# Patient Record
Sex: MALE | Race: WHITE | Employment: OTHER | URBAN - METROPOLITAN AREA
[De-identification: names, ages, dates, MRNs, and addresses within clinical notes are randomized per-mention and may not be internally consistent; named-entity substitution may affect disease eponyms.]

---

## 2021-02-05 ENCOUNTER — TELEPHONE (OUTPATIENT)
Dept: INTERNAL MEDICINE CLINIC | Age: 71
End: 2021-02-05

## 2021-02-05 NOTE — TELEPHONE ENCOUNTER
Pt states he use to be a patient of Dr. Salima Starr back when he was on [address] 20 years ago and moved out of town, he is now back in the city and would like Dr. Darrell Alfaro to be his Doctor again.  Please advise His phone number is [phone]

## 2021-02-22 ENCOUNTER — OFFICE VISIT (OUTPATIENT)
Dept: INTERNAL MEDICINE CLINIC | Age: 71
End: 2021-02-22
Payer: MEDICARE

## 2021-02-22 VITALS
SYSTOLIC BLOOD PRESSURE: 122 MMHG | HEIGHT: 73 IN | RESPIRATION RATE: 14 BRPM | HEART RATE: 68 BPM | DIASTOLIC BLOOD PRESSURE: 72 MMHG | WEIGHT: 174 LBS | BODY MASS INDEX: 23.06 KG/M2 | TEMPERATURE: 98.6 F

## 2021-02-22 DIAGNOSIS — R35.1 NOCTURIA: ICD-10-CM

## 2021-02-22 DIAGNOSIS — K21.9 GASTROESOPHAGEAL REFLUX DISEASE WITHOUT ESOPHAGITIS: ICD-10-CM

## 2021-02-22 DIAGNOSIS — N40.1 BENIGN PROSTATIC HYPERPLASIA WITH LOWER URINARY TRACT SYMPTOMS, SYMPTOM DETAILS UNSPECIFIED: ICD-10-CM

## 2021-02-22 DIAGNOSIS — H81.313 AUDITORY VERTIGO INVOLVING BOTH EARS: ICD-10-CM

## 2021-02-22 DIAGNOSIS — Z00.00 ROUTINE GENERAL MEDICAL EXAMINATION AT A HEALTH CARE FACILITY: ICD-10-CM

## 2021-02-22 DIAGNOSIS — I25.10 CORONARY ARTERY DISEASE INVOLVING NATIVE CORONARY ARTERY OF NATIVE HEART WITHOUT ANGINA PECTORIS: ICD-10-CM

## 2021-02-22 DIAGNOSIS — E78.5 HYPERLIPIDEMIA LDL GOAL <70: ICD-10-CM

## 2021-02-22 DIAGNOSIS — Z00.00 WELL ADULT EXAM: Primary | ICD-10-CM

## 2021-02-22 DIAGNOSIS — F51.01 PRIMARY INSOMNIA: ICD-10-CM

## 2021-02-22 PROCEDURE — 93000 ELECTROCARDIOGRAM COMPLETE: CPT | Performed by: INTERNAL MEDICINE

## 2021-02-22 PROCEDURE — 99204 OFFICE O/P NEW MOD 45 MIN: CPT | Performed by: INTERNAL MEDICINE

## 2021-02-22 PROCEDURE — 1123F ACP DISCUSS/DSCN MKR DOCD: CPT | Performed by: INTERNAL MEDICINE

## 2021-02-22 PROCEDURE — 3017F COLORECTAL CA SCREEN DOC REV: CPT | Performed by: INTERNAL MEDICINE

## 2021-02-22 PROCEDURE — G8427 DOCREV CUR MEDS BY ELIG CLIN: HCPCS | Performed by: INTERNAL MEDICINE

## 2021-02-22 PROCEDURE — G8420 CALC BMI NORM PARAMETERS: HCPCS | Performed by: INTERNAL MEDICINE

## 2021-02-22 PROCEDURE — 1036F TOBACCO NON-USER: CPT | Performed by: INTERNAL MEDICINE

## 2021-02-22 PROCEDURE — G8482 FLU IMMUNIZE ORDER/ADMIN: HCPCS | Performed by: INTERNAL MEDICINE

## 2021-02-22 PROCEDURE — 4040F PNEUMOC VAC/ADMIN/RCVD: CPT | Performed by: INTERNAL MEDICINE

## 2021-02-22 PROCEDURE — G0439 PPPS, SUBSEQ VISIT: HCPCS | Performed by: INTERNAL MEDICINE

## 2021-02-22 RX ORDER — MECLIZINE HYDROCHLORIDE 25 MG/1
25 TABLET ORAL 3 TIMES DAILY PRN
COMMUNITY
End: 2021-05-24 | Stop reason: SDUPTHER

## 2021-02-22 RX ORDER — LANOLIN ALCOHOL/MO/W.PET/CERES
1000 CREAM (GRAM) TOPICAL DAILY
COMMUNITY
End: 2022-03-02 | Stop reason: ALTCHOICE

## 2021-02-22 RX ORDER — PHENOL 1.4 %
AEROSOL, SPRAY (ML) MUCOUS MEMBRANE DAILY
COMMUNITY

## 2021-02-22 RX ORDER — ALBUTEROL SULFATE 90 UG/1
2 AEROSOL, METERED RESPIRATORY (INHALATION) EVERY 6 HOURS PRN
COMMUNITY
End: 2021-11-03 | Stop reason: SDUPTHER

## 2021-02-22 RX ORDER — SUCRALFATE 1 G/1
1 TABLET ORAL PRN
COMMUNITY
End: 2022-06-20

## 2021-02-22 RX ORDER — PANTOPRAZOLE SODIUM 40 MG/1
40 TABLET, DELAYED RELEASE ORAL DAILY
COMMUNITY
End: 2021-07-27 | Stop reason: SDUPTHER

## 2021-02-22 RX ORDER — ASPIRIN 81 MG/1
81 TABLET ORAL DAILY
COMMUNITY

## 2021-02-22 RX ORDER — ROSUVASTATIN CALCIUM 20 MG/1
20 TABLET, COATED ORAL DAILY
COMMUNITY
End: 2022-03-02 | Stop reason: SDUPTHER

## 2021-02-22 RX ORDER — ACETAMINOPHEN 325 MG/1
650 TABLET ORAL EVERY 6 HOURS PRN
COMMUNITY

## 2021-02-22 RX ORDER — CLONAZEPAM 0.5 MG/1
0.5 TABLET ORAL DAILY PRN
COMMUNITY
End: 2021-08-26 | Stop reason: SDUPTHER

## 2021-02-22 RX ORDER — MULTIVIT-MIN/IRON/FOLIC ACID/K 18-600-40
1 CAPSULE ORAL DAILY
COMMUNITY

## 2021-02-22 SDOH — HEALTH STABILITY: MENTAL HEALTH: HOW OFTEN DO YOU HAVE A DRINK CONTAINING ALCOHOL?: MONTHLY OR LESS

## 2021-02-22 ASSESSMENT — ENCOUNTER SYMPTOMS
ALLERGIC/IMMUNOLOGIC NEGATIVE: 1
CHEST TIGHTNESS: 1

## 2021-02-22 ASSESSMENT — PATIENT HEALTH QUESTIONNAIRE - PHQ9
SUM OF ALL RESPONSES TO PHQ QUESTIONS 1-9: 1
SUM OF ALL RESPONSES TO PHQ QUESTIONS 1-9: 1
SUM OF ALL RESPONSES TO PHQ9 QUESTIONS 1 & 2: 1

## 2021-02-22 ASSESSMENT — LIFESTYLE VARIABLES: AUDIT-C TOTAL SCORE: 1

## 2021-02-22 NOTE — ASSESSMENT & PLAN NOTE
? Get some pain when aggravated  Last cardiologst took him off Toprol  -EKG   Refer to Dr Bess Zaman

## 2021-02-22 NOTE — ASSESSMENT & PLAN NOTE
Within normal limits for age- retired  no ADL issues,immunizations up to date, no depression ,no cognitive impairment  Colonoscopy up to date get old records   Eye exam up to date  Exercises as tolerated    No living will but does not want resuscitation   Findings and recommendations discussed with Pt   Labs pending

## 2021-02-22 NOTE — PROGRESS NOTES
every 6 hours as needed for Wheezing      Simethicone (GAS-X PO) Take by mouth as needed      acetaminophen (TYLENOL) 325 MG tablet Take 650 mg by mouth every 6 hours as needed for Pain      Lido-Menthol-Methyl Sal-Camph (CBD KINGS EX) Apply topically daily      vitamin B-12 (CYANOCOBALAMIN) 1000 MCG tablet Take 1,000 mcg by mouth daily      sucralfate (CARAFATE) 1 GM tablet Take 1 g by mouth as needed       No current facility-administered medications for this visit. History reviewed. No pertinent past medical history. Family History   Problem Relation Age of Onset    Diabetes Mother     Heart Disease Father     Coronary Art Dis Brother        Past Surgical History:   Procedure Laterality Date    COLONOSCOPY      2017 ?  divertic will get records     CORONARY ARTERY BYPASS GRAFT  2019    2 Vessels     PROSTATE SURGERY  2017    TURP       Social History     Socioeconomic History    Marital status: Single     Spouse name: Not on file    Number of children: Not on file    Years of education: Not on file    Highest education level: Not on file   Occupational History    Not on file   Social Needs    Financial resource strain: Not on file    Food insecurity     Worry: Not on file     Inability: Not on file    Transportation needs     Medical: Not on file     Non-medical: Not on file   Tobacco Use    Smoking status: Former Smoker     Packs/day: 1.00     Years: 2.00     Pack years: 2.00     Types: Cigarettes     Quit date: 2/22/2018     Years since quitting: 3.0    Smokeless tobacco: Never Used   Substance and Sexual Activity    Alcohol use: Yes     Frequency: Monthly or less    Drug use: Not Currently    Sexual activity: Not on file   Lifestyle    Physical activity     Days per week: Not on file     Minutes per session: Not on file    Stress: Not on file   Relationships    Social connections     Talks on phone: Not on file     Gets together: Not on file     Attends Restorationism service: Not on file     Active member of club or organization: Not on file     Attends meetings of clubs or organizations: Not on file     Relationship status: Not on file    Intimate partner violence     Fear of current or ex partner: Not on file     Emotionally abused: Not on file     Physically abused: Not on file     Forced sexual activity: Not on file   Other Topics Concern    Not on file   Social History Narrative    Not on file       Review of Systems  Review of Systems   Constitutional: Negative for unexpected weight change. HENT: Positive for congestion and postnasal drip. Middle ear dizziness Tx with prn Klonapin and anitvert    Eyes:        Glasses  Has floaters    Respiratory: Positive for chest tightness. Cardiovascular: Negative for chest pain, palpitations and leg swelling. Gastrointestinal:        GERD cont diet and meds    Genitourinary: Positive for decreased urine volume. Nocturia s/p TURP    Musculoskeletal: Positive for arthralgias (in hands ). Allergic/Immunologic: Negative. Neurological: Positive for dizziness. Psychiatric/Behavioral: Positive for sleep disturbance (has taken Halcion for years ). Objective:   Physical Exam:  Physical Exam  Constitutional:       Appearance: Normal appearance. HENT:      Head: Normocephalic and atraumatic. Right Ear: Tympanic membrane, ear canal and external ear normal.      Left Ear: Tympanic membrane, ear canal and external ear normal.      Nose: Nose normal.   Eyes:      Extraocular Movements: Extraocular movements intact. Conjunctiva/sclera: Conjunctivae normal.      Pupils: Pupils are equal, round, and reactive to light. Neck:      Musculoskeletal: Normal range of motion. Cardiovascular:      Rate and Rhythm: Normal rate and regular rhythm. Pulses: Normal pulses. Heart sounds: Normal heart sounds. Pulmonary:      Effort: Pulmonary effort is normal.      Breath sounds: Normal breath sounds. Abdominal:      General: Abdomen is flat. Bowel sounds are normal.      Palpations: Abdomen is soft. Genitourinary:     Penis: Normal.       Prostate: Normal.      Rectum: Normal.   Musculoskeletal: Normal range of motion. Skin:     General: Skin is warm and dry. Neurological:      General: No focal deficit present. Mental Status: He is alert and oriented to person, place, and time. Mental status is at baseline. Psychiatric:         Mood and Affect: Mood normal.         Thought Content: Thought content normal.         /72 (Site: Right Upper Arm, Position: Sitting, Cuff Size: Medium Adult)   Pulse 68   Temp 98.6 °F (37 °C) (Temporal)   Resp 14   Ht 6' 1\" (1.854 m)   Wt 174 lb (78.9 kg)   BMI 22.96 kg/m²       Assessment & Plan:         Coronary artery disease involving native coronary artery  ?  Get some pain when aggravated  Last cardiologst took him off Toprol  -EKG   Refer to Dr Aguila Mckeon     Hyperlipidemia LDL goal <70  Check labs and adjust get old records cont diet and exercise     Gastroesophageal reflux disease without esophagitis  Cont with diet and meds     Auditory vertigo involving both ears  Controled with Antivert and occ Klonopin     Primary insomnia  Controled Halcion and good sleep mechanics     Well adult exam  Within normal limits for age- retired  no ADL issues,immunizations up to date, no depression ,no cognitive impairment  Colonoscopy up to date get old records   Eye exam up to date  Exercises as tolerated    No living will but does not want resuscitation   Findings and recommendations discussed with Pt   Labs pending     Benign prostatic hyperplasia with lower urinary tract symptoms  No new symptoms cont to watch      Karen Miami  1950    Allergies   Allergen Reactions    Adhesive Tape Rash     Current Outpatient Medications   Medication Sig Dispense Refill    rosuvastatin (CRESTOR) 20 MG tablet Take 20 mg by mouth daily      pantoprazole (PROTONIX) 40 MG tablet Take 40 mg by mouth daily      aspirin 81 MG EC tablet Take 81 mg by mouth daily      Ascorbic Acid (VITAMIN C) 500 MG CAPS Take 1 capsule by mouth daily      Multiple Vitamins-Minerals (MULTIVITAMIN ADULTS 50+) TABS Take by mouth daily      Probiotic Product (PROBIOTIC-10 PO) Take by mouth daily      clonazePAM (KLONOPIN) 0.5 MG tablet Take 0.5 mg by mouth daily as needed.  triazolam (HALCION) 0.25 MG tablet Take 0.25 mg by mouth nightly as needed.  meclizine (ANTIVERT) 25 MG tablet Take 25 mg by mouth 3 times daily as needed for Dizziness      albuterol sulfate HFA (VENTOLIN HFA) 108 (90 Base) MCG/ACT inhaler Inhale 2 puffs into the lungs every 6 hours as needed for Wheezing      Simethicone (GAS-X PO) Take by mouth as needed      acetaminophen (TYLENOL) 325 MG tablet Take 650 mg by mouth every 6 hours as needed for Pain      Lido-Menthol-Methyl Sal-Camph (CBD KINGS EX) Apply topically daily      vitamin B-12 (CYANOCOBALAMIN) 1000 MCG tablet Take 1,000 mcg by mouth daily      sucralfate (CARAFATE) 1 GM tablet Take 1 g by mouth as needed       No current facility-administered medications for this visit. Vitals:    02/22/21 1327   BP: 122/72   Site: Right Upper Arm   Position: Sitting   Cuff Size: Medium Adult   Pulse: 68   Resp: 14   Temp: 98.6 °F (37 °C)   TempSrc: Temporal   Weight: 174 lb (78.9 kg)   Height: 6' 1\" (1.854 m)     Body mass index is 22.96 kg/m². Wt Readings from Last 3 Encounters:   02/22/21 174 lb (78.9 kg)     BP Readings from Last 3 Encounters:   02/22/21 122/72       Consultants:   Patient Care Team:  Chris Dejesus MD as PCP - General (Internal Medicine)  Chris Dejesus MD as PCP - REHABILITATION [facility] Empaneled Provider    Chief Complaint:   Carl Mae is a 80 y.o. male who presents for Medicare Preventive Physical Examination with Personalized Prevention Plan Services.     HPI: Tr review listed chronic problems     Patient Active Problem List   Diagnosis    Coronary artery disease involving native coronary artery    Hyperlipidemia LDL goal <70    Gastroesophageal reflux disease without esophagitis    Auditory vertigo involving both ears    Primary insomnia    Well adult exam    Benign prostatic hyperplasia with lower urinary tract symptoms       Mood Disorders Screen:  Risk factors: none  Symptoms:  endorses difficulty sleeping, denies depressed mood, difficulty concentrating, changes in appetite and fatigue     Safety Assessment:  Functional ability/ADLs:  Difficulty with bathing- no, grooming- no, meals- no, incontinence- no.  Driving- yes. Home safety: Lives alone. Number of stairs to enter home: 3, within home: 0. Risk factors for falls: vestibular disturbance vertigo. Home environment modifications:  no.     End of Life Planning:  Advanced Directive: has NO advanced directive  - add't info requested. Referral to SW: no.      Preventive Care:  Self-testicular exams: Yes  Last PSA: 2, normal  Last colonoscopy: 2017, normal to get records  AAA screening:  no   Last eye exam: years ago refer to Dr Ronnie Goddard  Exercise: yes  Seatbelt use: yes      Immunization History   Administered Date(s) Administered    COVID-19, Moderna, 100mcg/0.5ml 02/04/2021    Influenza, High Dose (Fluzone 65 yrs and older) 10/22/2020    Pneumococcal Conjugate 13-valent ([id number]) 02/22/2019    Pneumococcal Polysaccharide ([id number]) 02/22/2020    Tdap (Boostrix, Adacel) 02/22/2020       History reviewed. No pertinent past medical history. Past Surgical History:   Procedure Laterality Date    COLONOSCOPY      2017 ?  divertic will get records     CORONARY ARTERY BYPASS GRAFT  2019    2 Vessels     PROSTATE SURGERY  2017    TURP     Family History   Problem Relation Age of Onset    Diabetes Mother     Heart Disease Father     Coronary Art Dis Brother      Social History     Socioeconomic History    Marital status: Single     Spouse name: Not on file    Number of children: Not on file  Years of education: Not on file    Highest education level: Not on file   Occupational History    Not on file   Social Needs    Financial resource strain: Not on file    Food insecurity     Worry: Not on file     Inability: Not on file    Transportation needs     Medical: Not on file     Non-medical: Not on file   Tobacco Use    Smoking status: Former Smoker     Packs/day: 1.00     Years: 2.00     Pack years: 2.00     Types: Cigarettes     Quit date: 2/22/2018     Years since quitting: 3.0    Smokeless tobacco: Never Used   Substance and Sexual Activity    Alcohol use: Yes     Frequency: Monthly or less    Drug use: Not Currently    Sexual activity: Not on file   Lifestyle    Physical activity     Days per week: Not on file     Minutes per session: Not on file    Stress: Not on file   Relationships    Social connections     Talks on phone: Not on file     Gets together: Not on file     Attends Faith service: Not on file     Active member of club or organization: Not on file     Attends meetings of clubs or organizations: Not on file     Relationship status: Not on file    Intimate partner violence     Fear of current or ex partner: Not on file     Emotionally abused: Not on file     Physically abused: Not on file     Forced sexual activity: Not on file   Other Topics Concern    Not on file   Social History Narrative    Not on file     }        Visual Acuity: Corrected:            L  20/30            R 20/40    Cognitive Screening:  Clock drawing test score: 5/5. Mini-mental status exam score: NA. Assessment/Plan:  Natacha Liu was seen today for medicare awv. Diagnoses and all orders for this visit:    Well adult exam  -     CBC Auto Differential; Future  -     Comprehensive Metabolic Panel; Future  -     Lipid Panel; Future  -     TSH with Reflex; Future  -     Urinalysis Reflex to Culture; Future  -     PSA, Prostatic Specific Antigen;  Future    Coronary artery disease involving native coronary artery of native heart without angina pectoris  -     CBC Auto Differential; Future  -     Comprehensive Metabolic Panel; Future  -     Lipid Panel; Future  -     TSH with Reflex; Future  -     Urinalysis Reflex to Culture; Future  -     PSA, Prostatic Specific Antigen; Future    Hyperlipidemia LDL goal <70  -     CBC Auto Differential; Future  -     Comprehensive Metabolic Panel; Future  -     Lipid Panel; Future  -     TSH with Reflex; Future  -     Urinalysis Reflex to Culture; Future  -     PSA, Prostatic Specific Antigen; Future    Gastroesophageal reflux disease without esophagitis  -     CBC Auto Differential; Future  -     Comprehensive Metabolic Panel; Future  -     Lipid Panel; Future  -     TSH with Reflex; Future  -     Urinalysis Reflex to Culture; Future  -     PSA, Prostatic Specific Antigen; Future    Auditory vertigo involving both ears  -     CBC Auto Differential; Future  -     Comprehensive Metabolic Panel; Future  -     Lipid Panel; Future  -     TSH with Reflex; Future  -     Urinalysis Reflex to Culture; Future  -     PSA, Prostatic Specific Antigen; Future    Primary insomnia  -     CBC Auto Differential; Future  -     Comprehensive Metabolic Panel; Future  -     Lipid Panel; Future  -     TSH with Reflex; Future  -     Urinalysis Reflex to Culture; Future  -     PSA, Prostatic Specific Antigen; Future    Benign prostatic hyperplasia with lower urinary tract symptoms, symptom details unspecified  -     CBC Auto Differential; Future  -     Comprehensive Metabolic Panel; Future  -     Lipid Panel; Future  -     TSH with Reflex; Future  -     Urinalysis Reflex to Culture; Future  -     PSA, Prostatic Specific Antigen; Future    Nocturia  -     CBC Auto Differential; Future  -     Comprehensive Metabolic Panel; Future  -     Lipid Panel; Future  -     TSH with Reflex; Future  -     Urinalysis Reflex to Culture; Future  -     PSA, Prostatic Specific Antigen;  Future      Medicare Annual Wellness Visit  Name: Dexter Archuleta Date: 2021   MRN: <[id number]> Sex: Male   Age: 80 y.o. Ethnicity: Non-/Non    : 1950 Race: Bobby Robison is here for Medicare AWV (New patient)    Screenings for behavioral, psychosocial and functional/safety risks, and cognitive dysfunction are all negative except as indicated below. These results, as well as other patient data from the [address] [facility] [place] [address] form, are documented in Flowsheets linked to this Encounter. Allergies   Allergen Reactions    Adhesive Tape Rash       Prior to Visit Medications    Medication Sig Taking? Authorizing Provider   rosuvastatin (CRESTOR) 20 MG tablet Take 20 mg by mouth daily Yes Historical Provider, MD   pantoprazole (PROTONIX) 40 MG tablet Take 40 mg by mouth daily Yes Historical Provider, MD   aspirin 81 MG EC tablet Take 81 mg by mouth daily Yes Historical Provider, MD   Ascorbic Acid (VITAMIN C) 500 MG CAPS Take 1 capsule by mouth daily Yes Historical Provider, MD   Multiple Vitamins-Minerals (MULTIVITAMIN ADULTS 50+) TABS Take by mouth daily Yes Historical Provider, MD   Probiotic Product (PROBIOTIC-10 PO) Take by mouth daily Yes Historical Provider, MD   clonazePAM (KLONOPIN) 0.5 MG tablet Take 0.5 mg by mouth daily as needed. Yes Historical Provider, MD   triazolam (HALCION) 0.25 MG tablet Take 0.25 mg by mouth nightly as needed.  Yes Historical Provider, MD   meclizine (ANTIVERT) 25 MG tablet Take 25 mg by mouth 3 times daily as needed for Dizziness Yes Historical Provider, MD   albuterol sulfate HFA (VENTOLIN HFA) 108 (90 Base) MCG/ACT inhaler Inhale 2 puffs into the lungs every 6 hours as needed for Wheezing Yes Historical Provider, MD   Simethicone (GAS-X PO) Take by mouth as needed Yes Historical Provider, MD   acetaminophen (TYLENOL) 325 MG tablet Take 650 mg by mouth every 6 hours as needed for Pain Yes Historical Provider, MD   Lido-Menthol-Methyl Sal-Camph (CBD KINGS EX) Apply Living Will?: (!) No  Advance Directives     Power of  Living Will ACP-Advance Directive ACP-Power of     Not on File Not on File Not on File Not on File      General Health Risk Interventions:  · No Living Will: Advance Care Planning addressed with patient today     Hearing/Vision:  No exam data present  Hearing/Vision  Do you or your family notice any trouble with your hearing that hasn't been managed with hearing aids?: No  Do you have difficulty driving, watching TV, or doing any of your daily activities because of your eyesight?: No  Have you had an eye exam within the past year?: (!) No  Hearing/Vision Interventions:  · Vision concerns:  patient encouraged to make appointment with his/her eye specialist    Safety:  Safety  Do you have working smoke detectors?: Yes  Have all throw rugs been removed or fastened?: Yes  Do you have non-slip mats or surfaces in all bathtubs/showers?: Yes  Do all of your stairways have a railing or banister?: (!) No  Are your doorways, halls and stairs free of clutter?: Yes  Do you always fasten your seatbelt when you are in a car?: Yes  Safety Interventions:  · Home safety tips provided     Personalized Preventive Plan   Current Health Maintenance Status  Immunization History   Administered Date(s) Administered    COVID-19, Moderna, 100mcg/0.5ml 02/04/2021    Influenza, High Dose (Fluzone 65 yrs and older) 10/22/2020    Pneumococcal Conjugate 13-valent (Mark Bonnet) 02/22/2019    Pneumococcal Polysaccharide ([id number]) 02/22/2020    Tdap (Boostrix, Adacel) 02/22/2020        Health Maintenance   Topic Date Due    AAA screen  1950    Hepatitis C screen  1950    Lipid screen  01/21/1961    Shingles Vaccine (1 of 2) 01/21/2001    Colon cancer screen colonoscopy  01/21/2001    COVID-19 Vaccine (2 of 2 - Moderna series) 03/04/2021    DTaP/Tdap/Td vaccine (2 - Td) 02/22/2030    Flu vaccine  Completed    Pneumococcal 65+ years Vaccine  Completed  Hepatitis A vaccine  Aged Out    Hepatitis B vaccine  Aged Out    Hib vaccine  Aged Out    Meningococcal (ACWY) vaccine  Aged Out     Recommendations for [employer] Due: see orders and patient instructions/AVS.  . Recommended screening schedule for the next 5-10 years is provided to the patient in written form: see Patient Instructions/AVS.    Nisha Schmidt was seen today for medicare aw. Diagnoses and all orders for this visit:    Well adult exam  -     CBC Auto Differential; Future  -     Comprehensive Metabolic Panel; Future  -     Lipid Panel; Future  -     TSH with Reflex; Future  -     Urinalysis Reflex to Culture; Future  -     PSA, Prostatic Specific Antigen; Future    Coronary artery disease involving native coronary artery of native heart without angina pectoris  -     CBC Auto Differential; Future  -     Comprehensive Metabolic Panel; Future  -     Lipid Panel; Future  -     TSH with Reflex; Future  -     Urinalysis Reflex to Culture; Future  -     PSA, Prostatic Specific Antigen; Future    Hyperlipidemia LDL goal <70  -     CBC Auto Differential; Future  -     Comprehensive Metabolic Panel; Future  -     Lipid Panel; Future  -     TSH with Reflex; Future  -     Urinalysis Reflex to Culture; Future  -     PSA, Prostatic Specific Antigen; Future    Gastroesophageal reflux disease without esophagitis  -     CBC Auto Differential; Future  -     Comprehensive Metabolic Panel; Future  -     Lipid Panel; Future  -     TSH with Reflex; Future  -     Urinalysis Reflex to Culture; Future  -     PSA, Prostatic Specific Antigen; Future    Auditory vertigo involving both ears  -     CBC Auto Differential; Future  -     Comprehensive Metabolic Panel; Future  -     Lipid Panel; Future  -     TSH with Reflex; Future  -     Urinalysis Reflex to Culture; Future  -     PSA, Prostatic Specific Antigen;  Future    Primary insomnia  -     CBC Auto Differential; Future  -     Comprehensive Metabolic Panel; Future  -     Lipid Panel; Future  -     TSH with Reflex; Future  -     Urinalysis Reflex to Culture; Future  -     PSA, Prostatic Specific Antigen; Future    Benign prostatic hyperplasia with lower urinary tract symptoms, symptom details unspecified  -     CBC Auto Differential; Future  -     Comprehensive Metabolic Panel; Future  -     Lipid Panel; Future  -     TSH with Reflex; Future  -     Urinalysis Reflex to Culture; Future  -     PSA, Prostatic Specific Antigen; Future    Nocturia  -     CBC Auto Differential; Future  -     Comprehensive Metabolic Panel; Future  -     Lipid Panel; Future  -     TSH with Reflex; Future  -     Urinalysis Reflex to Culture; Future  -     PSA, Prostatic Specific Antigen;  Future

## 2021-02-23 DIAGNOSIS — R35.1 NOCTURIA: ICD-10-CM

## 2021-02-23 DIAGNOSIS — H81.313 AUDITORY VERTIGO INVOLVING BOTH EARS: ICD-10-CM

## 2021-02-23 DIAGNOSIS — Z00.00 WELL ADULT EXAM: ICD-10-CM

## 2021-02-23 DIAGNOSIS — I25.10 CORONARY ARTERY DISEASE INVOLVING NATIVE CORONARY ARTERY OF NATIVE HEART WITHOUT ANGINA PECTORIS: ICD-10-CM

## 2021-02-23 DIAGNOSIS — N40.1 BENIGN PROSTATIC HYPERPLASIA WITH LOWER URINARY TRACT SYMPTOMS, SYMPTOM DETAILS UNSPECIFIED: ICD-10-CM

## 2021-02-23 DIAGNOSIS — F51.01 PRIMARY INSOMNIA: ICD-10-CM

## 2021-02-23 DIAGNOSIS — K21.9 GASTROESOPHAGEAL REFLUX DISEASE WITHOUT ESOPHAGITIS: ICD-10-CM

## 2021-02-23 DIAGNOSIS — E78.5 HYPERLIPIDEMIA LDL GOAL <70: ICD-10-CM

## 2021-02-23 LAB
BASOPHILS ABSOLUTE: 0 K/UL (ref 0–0.2)
BASOPHILS RELATIVE PERCENT: 0.3 %
BILIRUBIN URINE: NEGATIVE
BLOOD, URINE: NEGATIVE
CLARITY: CLEAR
COLOR: YELLOW
EOSINOPHILS ABSOLUTE: 0.2 K/UL (ref 0–0.6)
EOSINOPHILS RELATIVE PERCENT: 3.9 %
GLUCOSE URINE: NEGATIVE MG/DL
HCT VFR BLD CALC: 41.9 % (ref 40.5–52.5)
HEMOGLOBIN: 13.9 G/DL (ref 13.5–17.5)
KETONES, URINE: NEGATIVE MG/DL
LEUKOCYTE ESTERASE, URINE: NEGATIVE
LYMPHOCYTES ABSOLUTE: 0.9 K/UL (ref 1–5.1)
LYMPHOCYTES RELATIVE PERCENT: 20.6 %
MCH RBC QN AUTO: 31.8 PG (ref 26–34)
MCHC RBC AUTO-ENTMCNC: 33.2 G/DL (ref 31–36)
MCV RBC AUTO: 95.7 FL (ref 80–100)
MICROSCOPIC EXAMINATION: NORMAL
MONOCYTES ABSOLUTE: 0.3 K/UL (ref 0–1.3)
MONOCYTES RELATIVE PERCENT: 6.4 %
NEUTROPHILS ABSOLUTE: 3.1 K/UL (ref 1.7–7.7)
NEUTROPHILS RELATIVE PERCENT: 68.8 %
NITRITE, URINE: NEGATIVE
PDW BLD-RTO: 12.9 % (ref 12.4–15.4)
PH UA: 6 (ref 5–8)
PLATELET # BLD: 225 K/UL (ref 135–450)
PMV BLD AUTO: 9.7 FL (ref 5–10.5)
PROTEIN UA: NEGATIVE MG/DL
RBC # BLD: 4.38 M/UL (ref 4.2–5.9)
SPECIFIC GRAVITY UA: 1.02 (ref 1–1.03)
URINE REFLEX TO CULTURE: NORMAL
URINE TYPE: NORMAL
UROBILINOGEN, URINE: 0.2 E.U./DL
WBC # BLD: 4.6 K/UL (ref 4–11)

## 2021-02-24 LAB
A/G RATIO: 1.8 (ref 1.1–2.2)
ALBUMIN SERPL-MCNC: 4.3 G/DL (ref 3.4–5)
ALP BLD-CCNC: 68 U/L (ref 40–129)
ALT SERPL-CCNC: 22 U/L (ref 10–40)
ANION GAP SERPL CALCULATED.3IONS-SCNC: 10 MMOL/L (ref 3–16)
AST SERPL-CCNC: 21 U/L (ref 15–37)
BILIRUB SERPL-MCNC: 0.3 MG/DL (ref 0–1)
BUN BLDV-MCNC: 18 MG/DL (ref 7–20)
CALCIUM SERPL-MCNC: 9.3 MG/DL (ref 8.3–10.6)
CHLORIDE BLD-SCNC: 107 MMOL/L (ref 99–110)
CHOLESTEROL, TOTAL: 108 MG/DL (ref 0–199)
CO2: 29 MMOL/L (ref 21–32)
CREAT SERPL-MCNC: 0.7 MG/DL (ref 0.8–1.3)
GFR AFRICAN AMERICAN: >60
GFR NON-AFRICAN AMERICAN: >60
GLOBULIN: 2.4 G/DL
GLUCOSE BLD-MCNC: 96 MG/DL (ref 70–99)
HDLC SERPL-MCNC: 51 MG/DL (ref 40–60)
LDL CHOLESTEROL CALCULATED: 29 MG/DL
POTASSIUM SERPL-SCNC: 3.9 MMOL/L (ref 3.5–5.1)
PROSTATE SPECIFIC ANTIGEN: 2.82 NG/ML (ref 0–4)
SODIUM BLD-SCNC: 146 MMOL/L (ref 136–145)
TOTAL PROTEIN: 6.7 G/DL (ref 6.4–8.2)
TRIGL SERPL-MCNC: 139 MG/DL (ref 0–150)
TSH REFLEX: 1.77 UIU/ML (ref 0.27–4.2)
VLDLC SERPL CALC-MCNC: 28 MG/DL

## 2021-03-19 ENCOUNTER — TELEPHONE (OUTPATIENT)
Dept: INTERNAL MEDICINE CLINIC | Age: 71
End: 2021-03-19

## 2021-03-19 NOTE — TELEPHONE ENCOUNTER
Pt calling because he keeps having break outs around his nose mainly. Pt stated it does not borhter him.  Ongoing for 2 months     Wants to know if he should be referred to derm or see Dr. Romana Morse first?

## 2021-03-24 PROBLEM — Z00.00 WELL ADULT EXAM: Status: RESOLVED | Noted: 2021-02-22 | Resolved: 2021-03-24

## 2021-03-31 ENCOUNTER — OFFICE VISIT (OUTPATIENT)
Dept: CARDIOLOGY CLINIC | Age: 71
End: 2021-03-31
Payer: MEDICARE

## 2021-03-31 VITALS
HEART RATE: 67 BPM | HEIGHT: 73 IN | BODY MASS INDEX: 22.66 KG/M2 | SYSTOLIC BLOOD PRESSURE: 126 MMHG | DIASTOLIC BLOOD PRESSURE: 70 MMHG | TEMPERATURE: 97.7 F | WEIGHT: 171 LBS

## 2021-03-31 DIAGNOSIS — R07.9 CHEST PAIN, UNSPECIFIED TYPE: ICD-10-CM

## 2021-03-31 DIAGNOSIS — E78.2 MIXED HYPERLIPIDEMIA: ICD-10-CM

## 2021-03-31 DIAGNOSIS — Z95.1 S/P CABG X 2: ICD-10-CM

## 2021-03-31 DIAGNOSIS — Z00.00 EXAMINATION: ICD-10-CM

## 2021-03-31 DIAGNOSIS — I25.119 CORONARY ARTERY DISEASE INVOLVING NATIVE CORONARY ARTERY OF NATIVE HEART WITH ANGINA PECTORIS (HCC): Primary | ICD-10-CM

## 2021-03-31 PROCEDURE — 93000 ELECTROCARDIOGRAM COMPLETE: CPT | Performed by: INTERNAL MEDICINE

## 2021-03-31 PROCEDURE — 99204 OFFICE O/P NEW MOD 45 MIN: CPT | Performed by: INTERNAL MEDICINE

## 2021-03-31 PROCEDURE — G8427 DOCREV CUR MEDS BY ELIG CLIN: HCPCS | Performed by: INTERNAL MEDICINE

## 2021-03-31 PROCEDURE — G8482 FLU IMMUNIZE ORDER/ADMIN: HCPCS | Performed by: INTERNAL MEDICINE

## 2021-03-31 PROCEDURE — G8420 CALC BMI NORM PARAMETERS: HCPCS | Performed by: INTERNAL MEDICINE

## 2021-03-31 PROCEDURE — 3017F COLORECTAL CA SCREEN DOC REV: CPT | Performed by: INTERNAL MEDICINE

## 2021-03-31 PROCEDURE — 4040F PNEUMOC VAC/ADMIN/RCVD: CPT | Performed by: INTERNAL MEDICINE

## 2021-03-31 PROCEDURE — 1036F TOBACCO NON-USER: CPT | Performed by: INTERNAL MEDICINE

## 2021-03-31 PROCEDURE — 1123F ACP DISCUSS/DSCN MKR DOCD: CPT | Performed by: INTERNAL MEDICINE

## 2021-03-31 RX ORDER — METOPROLOL SUCCINATE 25 MG/1
25 TABLET, EXTENDED RELEASE ORAL DAILY
Qty: 90 TABLET | Refills: 3 | Status: SHIPPED | OUTPATIENT
Start: 2021-03-31 | End: 2022-02-28 | Stop reason: SDUPTHER

## 2021-03-31 NOTE — PROGRESS NOTES
80 y.o. here to establish care. Moved here from [place], [place]. Has children and grandchildren here. Has h/o CAD and had CABG in 8/2019. Had been having pain in the R side of chest. Ended up with an angiogram and severe CAD was found. Had 2V cabg and did well after. Overall, feels fine. He has chest burning at times. It's totally random. Doesn't feel well with it. It is not exertional. He has had this more often lately; is getting over a cold. He walks for exercise, and he does NOT get symptoms with it. Doing 10K steps per day and no issues. No LE edema. No orthopnea. No PND. No Syncope. When walking faster, gets a little worn out or feels like he's not breathing right. He is certain it's not his heart. Past Surgical History:   Procedure Laterality Date    COLONOSCOPY      2017 ? divertic will get records     CORONARY ARTERY BYPASS GRAFT  2019    2 Vessels     PROSTATE SURGERY  2017    TURP     Social History     Tobacco Use    Smoking status: Former Smoker     Packs/day: 1.00     Years: 2.00     Pack years: 2.00     Types: Cigarettes     Quit date: 2/22/2018     Years since quitting: 3.1    Smokeless tobacco: Never Used   Substance Use Topics    Alcohol use: Yes     Frequency: Monthly or less    Drug use: Not Currently     Allergies   Allergen Reactions    Adhesive Tape Rash     Family History   Problem Relation Age of Onset    Diabetes Mother     Heart Disease Father     Coronary Art Dis Brother      Review of Systems   General: No fevers, chills, fatigue, or night sweats. No abnormal changes in weight. HEENT: No blurry or decreased vision. No changes in hearing, nasal discharge or sore throat. Cardiovascular: See HPI. No cramping in legs or buttocks when walking. Respiratory: No cough, hemoptysis, or wheezing. No history of asthma. Gastrointestinal: No abdominal pain, hematochezia, melana, or history of GI ulcers. Genito-Urinary: No dysuria or hematuria.  No urgency or polyuria. Musculoskeletal: No complaints of joint pain, joint swelling or muscular weakness/soreness. Neurological: No dizziness or headaches. No numbness/tingling, speech problems or weakness. No history of a stroke or TIA. Psychological: No anxiety or depression  Hematological and Lymphatic: No abnormal bleeding or bruising, blood clots, jaundice. Endocrine: No malaise/lethargy, palpitations, polydipsia/polyuria, temperature intolerance or unexpected weight changes. Skin: No rashes or non-healing ulcers. PE:  Blood pressure 126/70, pulse 67, temperature 97.7 °F (36.5 °C), height 6' 1\" (1.854 m), weight 171 lb (77.6 kg). General (appearance): Well devel. No distress  Eyes: anicteric. EOMI  Neck: Supple. No jvd  Ears/Nose/Mouth/Thorat: No cyanosis  CV: RRR. No m/r/g   Respiratory:  Clear b, normal respiratory effort  GI: abd s/nt/nd  Skin: Warm, dry. No rashes  Neuro/Psych: Alert and oriented x 3. Appropriate behavior  Ext:  No c/c.  No edema  Pulses:  No edema    Lab Results   Component Value Date    WBC 4.6 02/23/2021    HGB 13.9 02/23/2021    HCT 41.9 02/23/2021    MCV 95.7 02/23/2021     02/23/2021     Lab Results   Component Value Date     (H) 02/23/2021    K 3.9 02/23/2021     02/23/2021    CO2 29 02/23/2021    BUN 18 02/23/2021    CREATININE 0.7 (L) 02/23/2021    GLUCOSE 96 02/23/2021    CALCIUM 9.3 02/23/2021    PROT 6.7 02/23/2021    LABALBU 4.3 02/23/2021    BILITOT 0.3 02/23/2021    ALKPHOS 68 02/23/2021    AST 21 02/23/2021    ALT 22 02/23/2021    LABGLOM >60 02/23/2021    GFRAA >60 02/23/2021    AGRATIO 1.8 02/23/2021    GLOB 2.4 02/23/2021     No results found for: INR, PROTIME    Lab Results   Component Value Date    CHOL 108 02/23/2021     Lab Results   Component Value Date    TRIG 139 02/23/2021     Lab Results   Component Value Date    HDL 51 02/23/2021     Lab Results   Component Value Date    LDLCALC 29 02/23/2021     Lab Results   Component Value Date    LABVLDL 28 02/23/2021     No results found for: CHOLHDLRATIO    ECG: NSR      Current Outpatient Medications:     rosuvastatin (CRESTOR) 20 MG tablet, Take 20 mg by mouth daily, Disp: , Rfl:     pantoprazole (PROTONIX) 40 MG tablet, Take 40 mg by mouth daily, Disp: , Rfl:     aspirin 81 MG EC tablet, Take 81 mg by mouth daily, Disp: , Rfl:     Ascorbic Acid (VITAMIN C) 500 MG CAPS, Take 1 capsule by mouth daily, Disp: , Rfl:     Multiple Vitamins-Minerals (MULTIVITAMIN ADULTS 50+) TABS, Take by mouth daily, Disp: , Rfl:     Probiotic Product (PROBIOTIC-10 PO), Take by mouth daily, Disp: , Rfl:     clonazePAM (KLONOPIN) 0.5 MG tablet, Take 0.5 mg by mouth daily as needed. , Disp: , Rfl:     triazolam (HALCION) 0.25 MG tablet, Take 0.25 mg by mouth nightly as needed. , Disp: , Rfl:     meclizine (ANTIVERT) 25 MG tablet, Take 25 mg by mouth 3 times daily as needed for Dizziness, Disp: , Rfl:     albuterol sulfate HFA (VENTOLIN HFA) 108 (90 Base) MCG/ACT inhaler, Inhale 2 puffs into the lungs every 6 hours as needed for Wheezing, Disp: , Rfl:     Simethicone (GAS-X PO), Take by mouth as needed, Disp: , Rfl:     acetaminophen (TYLENOL) 325 MG tablet, Take 650 mg by mouth every 6 hours as needed for Pain, Disp: , Rfl:     Lido-Menthol-Methyl Sal-Camph (CBD KINGS EX), Apply topically daily, Disp: , Rfl:     vitamin B-12 (CYANOCOBALAMIN) 1000 MCG tablet, Take 1,000 mcg by mouth daily, Disp: , Rfl:     sucralfate (CARAFATE) 1 GM tablet, Take 1 g by mouth as needed, Disp: , Rfl:     A/P:  80 y.o. here to est care. Has some chest discomfort. Issues:  1. Coronary artery disease involving native coronary artery of native heart with angina pectoris ([facility] [address]    2. Examination    3. Chest pain, unspecified type    4. Mixed hyperlipidemia    5.  S/P CABG x 2      Recs:  -Start Toprol 25 mg daily  -Get echo  -Myoview given cp  -F/U 6 mo, assuming testing is ok  -Cont crestor

## 2021-04-09 ENCOUNTER — HOSPITAL ENCOUNTER (OUTPATIENT)
Dept: NON INVASIVE DIAGNOSTICS | Age: 71
Discharge: HOME | End: 2021-04-09
Payer: MEDICARE

## 2021-04-09 DIAGNOSIS — I25.119 CORONARY ARTERY DISEASE INVOLVING NATIVE CORONARY ARTERY OF NATIVE HEART WITH ANGINA PECTORIS (HCC): ICD-10-CM

## 2021-04-09 DIAGNOSIS — Z00.00 EXAMINATION: ICD-10-CM

## 2021-04-09 DIAGNOSIS — E78.2 MIXED HYPERLIPIDEMIA: ICD-10-CM

## 2021-04-09 DIAGNOSIS — R07.9 CHEST PAIN, UNSPECIFIED TYPE: ICD-10-CM

## 2021-04-09 DIAGNOSIS — Z95.1 S/P CABG X 2: ICD-10-CM

## 2021-04-09 LAB
LV EF: 53 %
LV EF: 65 %
LVEF MODALITY: NORMAL
LVEF MODALITY: NORMAL

## 2021-04-09 PROCEDURE — 93017 CV STRESS TEST TRACING ONLY: CPT

## 2021-04-09 PROCEDURE — 78452 HT MUSCLE IMAGE SPECT MULT: CPT

## 2021-04-09 PROCEDURE — A9502 TC99M TETROFOSMIN: HCPCS | Performed by: INTERNAL MEDICINE

## 2021-04-09 PROCEDURE — 3430000000 HC RX DIAGNOSTIC RADIOPHARMACEUTICAL: Performed by: INTERNAL MEDICINE

## 2021-04-09 PROCEDURE — 2580000003 HC RX 258: Performed by: INTERNAL MEDICINE

## 2021-04-09 PROCEDURE — 93306 TTE W/DOPPLER COMPLETE: CPT

## 2021-04-09 RX ORDER — SODIUM CHLORIDE 0.9 % (FLUSH) 0.9 %
10 SYRINGE (ML) INJECTION PRN
Status: DISCONTINUED | OUTPATIENT
Start: 2021-04-09 | End: 2021-04-10 | Stop reason: HOSPADM

## 2021-04-09 RX ADMIN — TETROFOSMIN 36 MILLICURIE: 1.38 INJECTION, POWDER, LYOPHILIZED, FOR SOLUTION INTRAVENOUS at 10:16

## 2021-04-09 RX ADMIN — Medication 10 ML: at 08:50

## 2021-04-09 RX ADMIN — Medication 10 ML: at 10:16

## 2021-04-09 RX ADMIN — TETROFOSMIN 11.7 MILLICURIE: 1.38 INJECTION, POWDER, LYOPHILIZED, FOR SOLUTION INTRAVENOUS at 08:49

## 2021-04-15 ENCOUNTER — TELEPHONE (OUTPATIENT)
Dept: INTERNAL MEDICINE CLINIC | Age: 71
End: 2021-04-15

## 2021-04-15 DIAGNOSIS — G47.00 INSOMNIA, UNSPECIFIED TYPE: Primary | ICD-10-CM

## 2021-04-15 NOTE — TELEPHONE ENCOUNTER
Patient called to request triazolam (HALCION) 0.25 MG tablet refilled. [facility]/pharmacy [place] , NP - [address] [facility] [phone]    Please advise.

## 2021-04-22 ENCOUNTER — PATIENT MESSAGE (OUTPATIENT)
Dept: INTERNAL MEDICINE CLINIC | Age: 71
End: 2021-04-22

## 2021-04-28 ENCOUNTER — HOSPITAL ENCOUNTER (OUTPATIENT)
Age: 71
Discharge: HOME | End: 2021-04-28
Payer: MEDICARE

## 2021-04-28 ENCOUNTER — OFFICE VISIT (OUTPATIENT)
Dept: INTERNAL MEDICINE CLINIC | Age: 71
End: 2021-04-28
Payer: MEDICARE

## 2021-04-28 ENCOUNTER — HOSPITAL ENCOUNTER (OUTPATIENT)
Dept: GENERAL RADIOLOGY | Age: 71
Discharge: HOME | End: 2021-04-28
Payer: MEDICARE

## 2021-04-28 VITALS
BODY MASS INDEX: 23.4 KG/M2 | HEIGHT: 73 IN | DIASTOLIC BLOOD PRESSURE: 74 MMHG | TEMPERATURE: 98.1 F | HEART RATE: 68 BPM | SYSTOLIC BLOOD PRESSURE: 110 MMHG | RESPIRATION RATE: 12 BRPM | WEIGHT: 176.6 LBS

## 2021-04-28 DIAGNOSIS — F51.01 PRIMARY INSOMNIA: ICD-10-CM

## 2021-04-28 DIAGNOSIS — J43.2 CENTRILOBULAR EMPHYSEMA (HCC): ICD-10-CM

## 2021-04-28 DIAGNOSIS — I25.10 CORONARY ARTERY DISEASE INVOLVING NATIVE CORONARY ARTERY OF NATIVE HEART WITHOUT ANGINA PECTORIS: ICD-10-CM

## 2021-04-28 DIAGNOSIS — K21.9 GASTROESOPHAGEAL REFLUX DISEASE WITHOUT ESOPHAGITIS: ICD-10-CM

## 2021-04-28 DIAGNOSIS — N40.1 BENIGN PROSTATIC HYPERPLASIA WITH URINARY FREQUENCY: ICD-10-CM

## 2021-04-28 DIAGNOSIS — E78.5 HYPERLIPIDEMIA LDL GOAL <70: ICD-10-CM

## 2021-04-28 DIAGNOSIS — R35.0 BENIGN PROSTATIC HYPERPLASIA WITH URINARY FREQUENCY: ICD-10-CM

## 2021-04-28 DIAGNOSIS — R42 VERTIGO: ICD-10-CM

## 2021-04-28 DIAGNOSIS — J30.1 ALLERGIC RHINITIS DUE TO POLLEN, UNSPECIFIED SEASONALITY: Primary | ICD-10-CM

## 2021-04-28 PROBLEM — J43.9 PULMONARY EMPHYSEMA (HCC): Status: ACTIVE | Noted: 2021-04-28

## 2021-04-28 PROBLEM — J30.9 ALLERGIC RHINITIS: Status: ACTIVE | Noted: 2021-04-28

## 2021-04-28 PROCEDURE — 1036F TOBACCO NON-USER: CPT | Performed by: INTERNAL MEDICINE

## 2021-04-28 PROCEDURE — 3017F COLORECTAL CA SCREEN DOC REV: CPT | Performed by: INTERNAL MEDICINE

## 2021-04-28 PROCEDURE — 4040F PNEUMOC VAC/ADMIN/RCVD: CPT | Performed by: INTERNAL MEDICINE

## 2021-04-28 PROCEDURE — 71046 X-RAY EXAM CHEST 2 VIEWS: CPT

## 2021-04-28 PROCEDURE — G8926 SPIRO NO PERF OR DOC: HCPCS | Performed by: INTERNAL MEDICINE

## 2021-04-28 PROCEDURE — G8427 DOCREV CUR MEDS BY ELIG CLIN: HCPCS | Performed by: INTERNAL MEDICINE

## 2021-04-28 PROCEDURE — 3023F SPIROM DOC REV: CPT | Performed by: INTERNAL MEDICINE

## 2021-04-28 PROCEDURE — 99214 OFFICE O/P EST MOD 30 MIN: CPT | Performed by: INTERNAL MEDICINE

## 2021-04-28 PROCEDURE — 1123F ACP DISCUSS/DSCN MKR DOCD: CPT | Performed by: INTERNAL MEDICINE

## 2021-04-28 PROCEDURE — G8420 CALC BMI NORM PARAMETERS: HCPCS | Performed by: INTERNAL MEDICINE

## 2021-04-28 ASSESSMENT — ENCOUNTER SYMPTOMS
VOMITING: 0
EYES NEGATIVE: 1
ALLERGIC/IMMUNOLOGIC NEGATIVE: 1
EYE PAIN: 0
VISUAL CHANGE: 1
NAUSEA: 0
CHEST TIGHTNESS: 1

## 2021-04-28 ASSESSMENT — PATIENT HEALTH QUESTIONNAIRE - PHQ9
SUM OF ALL RESPONSES TO PHQ QUESTIONS 1-9: 0
1. LITTLE INTEREST OR PLEASURE IN DOING THINGS: 0
2. FEELING DOWN, DEPRESSED OR HOPELESS: 0

## 2021-04-28 NOTE — PROGRESS NOTES
Subjective:      Patient ID: David Nelson is a 80 y.o. male. HPI  Here today for follow up of chronic problems as per HPI and as problems listed under assessment and plan,no new c/o feels good     Headache   This is a recurrent problem. The current episode started more than 1 year ago. The problem occurs intermittently. The problem has been waxing and waning. The pain is located in the temporal and left unilateral region. The pain does not radiate. The pain quality is similar to prior headaches. The quality of the pain is described as aching. The pain is at a severity of 3/10. The pain is mild. Associated symptoms include dizziness, drainage and a visual change (mild blurred vision ). Pertinent negatives include no ear pain, eye pain, nausea or vomiting. Nothing aggravates the symptoms. He has tried acetaminophen and NSAIDs for the symptoms. The treatment provided mild relief. Allergies   Allergen Reactions    Adhesive Tape Rash       Current Outpatient Medications   Medication Sig Dispense Refill    triazolam (HALCION) 0.25 MG tablet Take 1 tablet by mouth nightly as needed (sleep) for up to 30 days. 30 tablet 0    metoprolol succinate (TOPROL XL) 25 MG extended release tablet Take 1 tablet by mouth daily 90 tablet 3    rosuvastatin (CRESTOR) 20 MG tablet Take 20 mg by mouth daily      pantoprazole (PROTONIX) 40 MG tablet Take 40 mg by mouth daily      aspirin 81 MG EC tablet Take 81 mg by mouth daily      Ascorbic Acid (VITAMIN C) 500 MG CAPS Take 1 capsule by mouth daily      Multiple Vitamins-Minerals (MULTIVITAMIN ADULTS 50+) TABS Take by mouth daily      Probiotic Product (PROBIOTIC-10 PO) Take by mouth daily      clonazePAM (KLONOPIN) 0.5 MG tablet Take 0.5 mg by mouth daily as needed.       meclizine (ANTIVERT) 25 MG tablet Take 25 mg by mouth 3 times daily as needed for Dizziness      albuterol sulfate HFA (VENTOLIN HFA) 108 (90 Base) MCG/ACT inhaler Inhale 2 puffs into the lungs every 6 Gets together: Not on file     Attends Judaism service: Not on file     Active member of club or organization: Not on file     Attends meetings of clubs or organizations: Not on file     Relationship status: Not on file    Intimate partner violence     Fear of current or ex partner: Not on file     Emotionally abused: Not on file     Physically abused: Not on file     Forced sexual activity: Not on file   Other Topics Concern    Not on file   Social History Narrative    Not on file       Review of Systems  Review of Systems   Constitutional: Negative for unexpected weight change. HENT: Positive for congestion and postnasal drip. Negative for ear pain. Middle ear dizziness Tx with prn Klonapin and anitvert    Eyes: Negative. Negative for pain. Glasses  Has floaters    Respiratory: Positive for chest tightness. Cardiovascular: Negative for chest pain, palpitations and leg swelling. Gastrointestinal: Negative for nausea and vomiting. GERD cont diet and meds    Genitourinary: Positive for decreased urine volume. Nocturia s/p TURP    Musculoskeletal: Positive for arthralgias (in hands ). Allergic/Immunologic: Negative. Neurological: Positive for dizziness and headaches. Psychiatric/Behavioral: Positive for sleep disturbance (has taken Halcion for years ). Objective:   Physical Exam:  Physical Exam  Constitutional:       Appearance: Normal appearance. HENT:      Head: Normocephalic and atraumatic. Right Ear: External ear normal.      Left Ear: External ear normal.   Eyes:      Extraocular Movements: Extraocular movements intact. Conjunctiva/sclera: Conjunctivae normal.      Pupils: Pupils are equal, round, and reactive to light. Neck:      Musculoskeletal: Normal range of motion. Cardiovascular:      Rate and Rhythm: Normal rate and regular rhythm. Pulses: Normal pulses. Heart sounds: Normal heart sounds.    Pulmonary:      Effort: Pulmonary effort is normal.      Breath sounds: Normal breath sounds. Abdominal:      General: Abdomen is flat. Bowel sounds are normal.      Palpations: Abdomen is soft. Musculoskeletal: Normal range of motion. Skin:     General: Skin is warm and dry. Neurological:      General: No focal deficit present. Mental Status: He is alert and oriented to person, place, and time. Mental status is at baseline. Psychiatric:         Mood and Affect: Mood normal.         Thought Content:  Thought content normal.         /74 (Site: Right Upper Arm, Position: Sitting, Cuff Size: Medium Adult)   Pulse 68   Temp 98.1 °F (36.7 °C) (Temporal)   Resp 12   Ht 6' 1\" (1.854 m)   Wt 176 lb 9.6 oz (80.1 kg)   BMI 23.30 kg/m²       Assessment & Plan:         Primary insomnia  Cont good sleep mechanics to try melatonin 10 mg again may cont Halcion contract signed as noted     Hyperlipidemia LDL goal <70    At goal cont diet and meds     Gastroesophageal reflux disease without esophagitis  Controled with diet and prn meds     Coronary artery disease involving native coronary artery   Monitored by specialist- no acute findings meriting change in the plan  Was started on metoprolol 25 mg to change to take at nite      Benign prostatic hyperplasia with lower urinary tract symptoms    Cont F/U as noted     Vertigo   Has seen neurologist in [place] Tx with Klonopin prn it works well to use prn      Pulmonary emphysema ([facility] [address]  See records from [place] last CT small 3mm pulmonary nodule RML no new symptoms will send for CXR consider CT after this to F/U as per [address] current meds     Allergic rhinitis   Tx with Flonase,Claritin and Mucinex DM  And Tylenol for HA

## 2021-04-28 NOTE — ASSESSMENT & PLAN NOTE
Monitored by specialist- no acute findings meriting change in the plan  Was started on metoprolol 25 mg to change to take at nite

## 2021-04-28 NOTE — ASSESSMENT & PLAN NOTE
See records from [place] last CT small 3mm pulmonary nodule RML no new symptoms will send for CXR consider CT after this to F/U as per [address] current meds

## 2021-04-30 ENCOUNTER — TELEPHONE (OUTPATIENT)
Dept: ORTHOPEDIC SURGERY | Age: 71
End: 2021-04-30

## 2021-05-24 DIAGNOSIS — G47.00 INSOMNIA, UNSPECIFIED TYPE: ICD-10-CM

## 2021-05-24 RX ORDER — MECLIZINE HYDROCHLORIDE 25 MG/1
25 TABLET ORAL 3 TIMES DAILY PRN
Qty: 30 TABLET | Refills: 1 | Status: SHIPPED | OUTPATIENT
Start: 2021-05-24 | End: 2021-08-26 | Stop reason: SDUPTHER

## 2021-05-24 NOTE — TELEPHONE ENCOUNTER
Patient called in stating that he needs a refill on    triazolam (HALCION) 0.25 MG tablet     And    meclizine (ANTIVERT) 25 MG tablet     Would like to have the medications  Sent to   [address] [place] [phone]     Please call to advise

## 2021-06-15 ENCOUNTER — CLINICAL DOCUMENTATION (OUTPATIENT)
Dept: OTHER | Age: 71
End: 2021-06-15

## 2021-06-27 ENCOUNTER — HOSPITAL ENCOUNTER (EMERGENCY)
Age: 71
Discharge: HOME | End: 2021-06-27
Attending: EMERGENCY MEDICINE
Payer: MEDICARE

## 2021-06-27 ENCOUNTER — APPOINTMENT (OUTPATIENT)
Dept: GENERAL RADIOLOGY | Age: 71
End: 2021-06-27
Payer: MEDICARE

## 2021-06-27 VITALS
HEIGHT: 73 IN | BODY MASS INDEX: 23.33 KG/M2 | RESPIRATION RATE: 21 BRPM | SYSTOLIC BLOOD PRESSURE: 126 MMHG | WEIGHT: 176 LBS | OXYGEN SATURATION: 96 % | HEART RATE: 55 BPM | TEMPERATURE: 98.4 F | DIASTOLIC BLOOD PRESSURE: 78 MMHG

## 2021-06-27 DIAGNOSIS — R07.9 CHEST PAIN, UNSPECIFIED TYPE: Primary | ICD-10-CM

## 2021-06-27 LAB
ANION GAP SERPL CALCULATED.3IONS-SCNC: 10 MMOL/L (ref 3–16)
BASOPHILS ABSOLUTE: 0 K/UL (ref 0–0.2)
BASOPHILS RELATIVE PERCENT: 0.2 %
BUN BLDV-MCNC: 22 MG/DL (ref 7–20)
CALCIUM SERPL-MCNC: 9.2 MG/DL (ref 8.3–10.6)
CHLORIDE BLD-SCNC: 106 MMOL/L (ref 99–110)
CO2: 26 MMOL/L (ref 21–32)
CREAT SERPL-MCNC: 0.8 MG/DL (ref 0.8–1.3)
EOSINOPHILS ABSOLUTE: 0.2 K/UL (ref 0–0.6)
EOSINOPHILS RELATIVE PERCENT: 3.2 %
GFR AFRICAN AMERICAN: >60
GFR NON-AFRICAN AMERICAN: >60
GLUCOSE BLD-MCNC: 92 MG/DL (ref 70–99)
HCT VFR BLD CALC: 41.7 % (ref 40.5–52.5)
HEMOGLOBIN: 14.6 G/DL (ref 13.5–17.5)
LYMPHOCYTES ABSOLUTE: 1.4 K/UL (ref 1–5.1)
LYMPHOCYTES RELATIVE PERCENT: 23.8 %
MCH RBC QN AUTO: 32.8 PG (ref 26–34)
MCHC RBC AUTO-ENTMCNC: 35.1 G/DL (ref 31–36)
MCV RBC AUTO: 93.6 FL (ref 80–100)
MONOCYTES ABSOLUTE: 0.6 K/UL (ref 0–1.3)
MONOCYTES RELATIVE PERCENT: 9.6 %
NEUTROPHILS ABSOLUTE: 3.7 K/UL (ref 1.7–7.7)
NEUTROPHILS RELATIVE PERCENT: 63.2 %
PDW BLD-RTO: 12.8 % (ref 12.4–15.4)
PLATELET # BLD: 213 K/UL (ref 135–450)
PMV BLD AUTO: 8.3 FL (ref 5–10.5)
POTASSIUM REFLEX MAGNESIUM: 3.9 MMOL/L (ref 3.5–5.1)
PRO-BNP: 95 PG/ML (ref 0–124)
RBC # BLD: 4.46 M/UL (ref 4.2–5.9)
SODIUM BLD-SCNC: 142 MMOL/L (ref 136–145)
TROPONIN: <0.01 NG/ML
WBC # BLD: 5.9 K/UL (ref 4–11)

## 2021-06-27 PROCEDURE — 85025 COMPLETE CBC W/AUTO DIFF WBC: CPT

## 2021-06-27 PROCEDURE — 99283 EMERGENCY DEPT VISIT LOW MDM: CPT

## 2021-06-27 PROCEDURE — 96374 THER/PROPH/DIAG INJ IV PUSH: CPT

## 2021-06-27 PROCEDURE — 84484 ASSAY OF TROPONIN QUANT: CPT

## 2021-06-27 PROCEDURE — 80048 BASIC METABOLIC PNL TOTAL CA: CPT

## 2021-06-27 PROCEDURE — 83880 ASSAY OF NATRIURETIC PEPTIDE: CPT

## 2021-06-27 PROCEDURE — 93005 ELECTROCARDIOGRAM TRACING: CPT | Performed by: STUDENT IN AN ORGANIZED HEALTH CARE EDUCATION/TRAINING PROGRAM

## 2021-06-27 PROCEDURE — 71046 X-RAY EXAM CHEST 2 VIEWS: CPT

## 2021-06-27 PROCEDURE — 6360000002 HC RX W HCPCS: Performed by: STUDENT IN AN ORGANIZED HEALTH CARE EDUCATION/TRAINING PROGRAM

## 2021-06-27 RX ORDER — KETOROLAC TROMETHAMINE 30 MG/ML
15 INJECTION, SOLUTION INTRAMUSCULAR; INTRAVENOUS ONCE
Status: COMPLETED | OUTPATIENT
Start: 2021-06-27 | End: 2021-06-27

## 2021-06-27 RX ADMIN — KETOROLAC TROMETHAMINE 15 MG: 30 INJECTION, SOLUTION INTRAMUSCULAR; INTRAVENOUS at 20:50

## 2021-06-27 ASSESSMENT — ENCOUNTER SYMPTOMS
SORE THROAT: 0
EYE PAIN: 0
SHORTNESS OF BREATH: 1
VOMITING: 0
BACK PAIN: 0
COUGH: 0
WHEEZING: 0
NAUSEA: 0
ABDOMINAL PAIN: 0

## 2021-06-27 ASSESSMENT — PAIN DESCRIPTION - ONSET: ONSET: ON-GOING

## 2021-06-27 ASSESSMENT — PAIN DESCRIPTION - LOCATION: LOCATION: CHEST

## 2021-06-27 ASSESSMENT — PAIN DESCRIPTION - FREQUENCY: FREQUENCY: CONTINUOUS

## 2021-06-27 ASSESSMENT — PAIN SCALES - GENERAL
PAINLEVEL_OUTOF10: 3
PAINLEVEL_OUTOF10: 3

## 2021-06-27 ASSESSMENT — PAIN DESCRIPTION - PROGRESSION: CLINICAL_PROGRESSION: NOT CHANGED

## 2021-06-27 ASSESSMENT — PAIN DESCRIPTION - PAIN TYPE: TYPE: ACUTE PAIN

## 2021-06-27 ASSESSMENT — PAIN DESCRIPTION - DESCRIPTORS: DESCRIPTORS: DISCOMFORT;ACHING

## 2021-06-27 NOTE — ED PROVIDER NOTES
history of CAD (coronary artery disease) and Hyperlipidemia. He has a past surgical history that includes Coronary artery bypass graft (2019); Prostate surgery (2017); and Colonoscopy. His family history includes Coronary Art Dis in his brother; Diabetes in his mother; Heart Disease in his father. He reports that he quit smoking about 3 years ago. His smoking use included cigarettes. He has a 2.00 pack-year smoking history. He has never used smokeless tobacco. He reports current alcohol use. He reports previous drug use. Medications     Previous Medications    ACETAMINOPHEN (TYLENOL) 325 MG TABLET    Take 650 mg by mouth every 6 hours as needed for Pain    ALBUTEROL SULFATE HFA (VENTOLIN HFA) 108 (90 BASE) MCG/ACT INHALER    Inhale 2 puffs into the lungs every 6 hours as needed for Wheezing    ASCORBIC ACID (VITAMIN C) 500 MG CAPS    Take 1 capsule by mouth daily    ASPIRIN 81 MG EC TABLET    Take 81 mg by mouth daily    CLONAZEPAM (KLONOPIN) 0.5 MG TABLET    Take 0.5 mg by mouth daily as needed. LIDO-MENTHOL-METHYL SAL-CAMPH (CBD KINGS EX)    Apply topically daily    MECLIZINE (ANTIVERT) 25 MG TABLET    Take 1 tablet by mouth 3 times daily as needed for Dizziness    METOPROLOL SUCCINATE (TOPROL XL) 25 MG EXTENDED RELEASE TABLET    Take 1 tablet by mouth daily    MULTIPLE VITAMINS-MINERALS (MULTIVITAMIN ADULTS 50+) TABS    Take by mouth daily    PANTOPRAZOLE (PROTONIX) 40 MG TABLET    Take 40 mg by mouth daily    PROBIOTIC PRODUCT (PROBIOTIC-10 PO)    Take by mouth daily    ROSUVASTATIN (CRESTOR) 20 MG TABLET    Take 20 mg by mouth daily    SIMETHICONE (GAS-X PO)    Take by mouth as needed    SUCRALFATE (CARAFATE) 1 GM TABLET    Take 1 g by mouth as needed    VITAMIN B-12 (CYANOCOBALAMIN) 1000 MCG TABLET    Take 1,000 mcg by mouth daily       Allergies     He is allergic to adhesive tape.     Physical Exam     INITIAL VITALS: BP: (!) 145/93, Temp: 98.4 °F (36.9 °C), Pulse: 64, Resp: 16, SpO2: 98 %   Physical Exam  Vitals reviewed. Constitutional:       General: He is not in acute distress. Appearance: He is well-developed. He is not ill-appearing. HENT:      Head: Normocephalic and atraumatic. Eyes:      Extraocular Movements: Extraocular movements intact. Cardiovascular:      Rate and Rhythm: Normal rate and regular rhythm. Heart sounds: No murmur heard. No friction rub. No gallop. Pulmonary:      Effort: Pulmonary effort is normal. No respiratory distress. Breath sounds: Normal breath sounds. No wheezing, rhonchi or rales. Chest:      Chest wall: No tenderness. Abdominal:      Palpations: Abdomen is soft. Tenderness: There is no abdominal tenderness. There is no guarding or rebound. Musculoskeletal:      Cervical back: Normal range of motion and neck supple. Right lower leg: No tenderness. No edema. Left lower leg: No tenderness. No edema. Skin:     General: Skin is warm and dry. Capillary Refill: Capillary refill takes less than 2 seconds. Neurological:      General: No focal deficit present. Mental Status: He is alert and oriented to person, place, and time. Motor: No weakness. DiagnosticResults     EKG   Interpreted in conjunction with emergencydepartment physician Familia Simpson MD  Rhythm: normal sinus   Rate: normal  Axis: normal  Ectopy: none  Conduction: normal  ST Segments: nonspecific changes  T Waves:inversion in  III  Q Waves: III and aVr  Clinical Impression: non-specific EKG  Comparison: T wave flattening and small inversion in III is mild change compared to previous EKGs. Q wave in III not seen on EKG from 3/31/2021, but this is seen on EKG from 2/22/2021    RADIOLOGY:  XR CHEST (2 VW)   Final Result      No evidence of acute cardiopulmonary disease.                 LABS:   Results for orders placed or performed during the hospital encounter of 06/27/21   CBC Auto Differential   Result Value Ref Range    WBC 5.9 4.0 - 11.0 K/uL RBC 4.46 4.20 - 5.90 M/uL    Hemoglobin 14.6 13.5 - 17.5 g/dL    Hematocrit 41.7 40.5 - 52.5 %    MCV 93.6 80.0 - 100.0 fL    MCH 32.8 26.0 - 34.0 pg    MCHC 35.1 31.0 - 36.0 g/dL    RDW 12.8 12.4 - 15.4 %    Platelets 082 039 - 041 K/uL    MPV 8.3 5.0 - 10.5 fL    Neutrophils % 63.2 %    Lymphocytes % 23.8 %    Monocytes % 9.6 %    Eosinophils % 3.2 %    Basophils % 0.2 %    Neutrophils Absolute 3.7 1.7 - 7.7 K/uL    Lymphocytes Absolute 1.4 1.0 - 5.1 K/uL    Monocytes Absolute 0.6 0.0 - 1.3 K/uL    Eosinophils Absolute 0.2 0.0 - 0.6 K/uL    Basophils Absolute 0.0 0.0 - 0.2 K/uL   Basic Metabolic Panel w/ Reflex to MG   Result Value Ref Range    Sodium 142 136 - 145 mmol/L    Potassium reflex Magnesium 3.9 3.5 - 5.1 mmol/L    Chloride 106 99 - 110 mmol/L    CO2 26 21 - 32 mmol/L    Anion Gap 10 3 - 16    Glucose 92 70 - 99 mg/dL    BUN 22 (H) 7 - 20 mg/dL    CREATININE 0.8 0.8 - 1.3 mg/dL    GFR Non-African American >60 >60    GFR African American >60 >60    Calcium 9.2 8.3 - 10.6 mg/dL   Troponin   Result Value Ref Range    Troponin <0.01 <0.01 ng/mL   Brain Natriuretic Peptide   Result Value Ref Range    Pro-BNP 95 0 - 124 pg/mL       ED BEDSIDE ULTRASOUND:  None    RECENT VITALS:  BP: 126/78, Temp: 98.4 °F (36.9 °C), Pulse: 55,Resp: 21, SpO2: 96 %     Procedures     None    ED Course     Nursing Notes, Past Medical Hx, Past Surgical Hx, Social Hx, Allergies, and Family Hx were reviewed. The patient was given the followingmedications:  Orders Placed This Encounter   Medications    ketorolac (TORADOL) injection 15 mg       CONSULTS:  None    MEDICAL DECISION MAKING / ASSESSMENT / Estrellita Elly is a 80 y.o. male with history of coronary artery disease status post CABG 2 years ago presenting with chest pain. On arrival to the emergency department, the patient is afebrile and hemodynamically stable.   Review of his chart demonstrates a low risk nuclear perfusion scan performed 2 months ago at this institution. Overall, his history is most concerning for a muscle strain, though ACS, pulmonary embolus, pneumonia, and aortic dissection are also considered. The patient's lab work-up includes renal panel with no concerning electrolyte abnormalities. CBC reveals no leukocytosis or anemia. His troponin is undetectable. With over 2 days of symptoms which are unchanged, I do not feel that a repeat troponin is warranted at this time. His EKG demonstrates nonspecific ST-T wave changes that are not concerning for ischemia, with no notable changes from prior. I am reassured by his recent normal stress test less than 2 months ago and the quite positional nature of his symptoms today. His x-ray demonstrates no infiltrate concerning for pneumonia or mediastinal widening. The description of symptoms is also less concerning for near dissection. With no hypoxia, tachypnea, tachycardia, signs or symptoms of DVT, or history of thromboembolic disease, I am not concerned for PE and do not feel that further evaluation is warranted for this at this time. Patient is advised to call his cardiologist and schedule an appoint for follow-up. Return precautions were reviewed. This patient was also evaluated by the attending physician. All care plans werediscussed and agreed upon. Clinical Impression     1.  Chest pain, unspecified type        Disposition     PATIENT REFERRED TO:  Your Cardiologist    Schedule an appointment as soon as possible for a visit         DISCHARGE MEDICATIONS:  New Prescriptions    No medications on file       DISPOSITION Decision To Discharge 06/27/2021 09:10:51 PM       Priya Newsome MD  Resident  06/27/21 [id number]

## 2021-06-27 NOTE — ED TRIAGE NOTES
Pt came into the ED with pain on the left side of his chest with SOB that start within the past few days. Hx of heart surgery, denies pain from exertion.

## 2021-06-27 NOTE — ED PROVIDER NOTES
ED Attending Attestation Note     Date of evaluation: 6/27/2021    This patient was seen by the resident. I have seen and examined the patient, agree with the workup, evaluation, management and diagnosis. The care plan has been discussed. I have reviewed the ECG and concur with the resident's interpretation. My assessment reveals 78-year-old male presenting to the emergency department with a complaint of chest pain. On examination lungs clear to auscultation bilaterally.      Hay Barkley MD  06/27/21 [id number]

## 2021-06-28 ENCOUNTER — CARE COORDINATION (OUTPATIENT)
Dept: CASE MANAGEMENT | Age: 71
End: 2021-06-28

## 2021-06-28 LAB
EKG ATRIAL RATE: 60 BPM
EKG DIAGNOSIS: NORMAL
EKG P AXIS: 65 DEGREES
EKG P-R INTERVAL: 172 MS
EKG Q-T INTERVAL: 424 MS
EKG QRS DURATION: 92 MS
EKG QTC CALCULATION (BAZETT): 424 MS
EKG R AXIS: 12 DEGREES
EKG T AXIS: 25 DEGREES
EKG VENTRICULAR RATE: 60 BPM

## 2021-06-28 NOTE — CARE COORDINATION
Educated patient about risk for severe COVID-19 due to risk factors according to CDC guidelines. ACM reviewed discharge instructions, medical action plan and red flag symptoms with the patient who verbalized understanding. Discussed COVID vaccination status: Yes. Education provided on COVID-19 vaccination as appropriate. Discussed exposure protocols and quarantine with CDC Guidelines. Patient was given an opportunity to verbalize any questions and concerns and agrees to contact ACM or health care provider for questions related to their healthcare. Patient reports he has completed the 2nd COVID-19 vaccine.   Encouraged patient to view the www.cdc.gov web site to re-enforce information reviewed and for COVID-19 updates  Patient reports he has scheduled f/u appointment with his cardiologist 7/8/2021 at 10:00 am  No further outreach scheduled with this ACM; episode of care resolved

## 2021-06-30 DIAGNOSIS — G47.00 INSOMNIA, UNSPECIFIED TYPE: ICD-10-CM

## 2021-07-02 DIAGNOSIS — G47.00 INSOMNIA, UNSPECIFIED TYPE: ICD-10-CM

## 2021-07-08 ENCOUNTER — OFFICE VISIT (OUTPATIENT)
Dept: CARDIOLOGY CLINIC | Age: 71
End: 2021-07-08
Payer: MEDICARE

## 2021-07-08 VITALS
DIASTOLIC BLOOD PRESSURE: 72 MMHG | BODY MASS INDEX: 24.25 KG/M2 | HEART RATE: 78 BPM | SYSTOLIC BLOOD PRESSURE: 120 MMHG | WEIGHT: 183 LBS | OXYGEN SATURATION: 98 % | HEIGHT: 73 IN

## 2021-07-08 DIAGNOSIS — J43.8 OTHER EMPHYSEMA (HCC): Primary | ICD-10-CM

## 2021-07-08 DIAGNOSIS — E78.5 HYPERLIPIDEMIA LDL GOAL <70: ICD-10-CM

## 2021-07-08 DIAGNOSIS — I25.10 CORONARY ARTERY DISEASE INVOLVING NATIVE CORONARY ARTERY OF NATIVE HEART WITHOUT ANGINA PECTORIS: ICD-10-CM

## 2021-07-08 DIAGNOSIS — E78.00 PURE HYPERCHOLESTEROLEMIA: ICD-10-CM

## 2021-07-08 PROCEDURE — 3017F COLORECTAL CA SCREEN DOC REV: CPT | Performed by: NURSE PRACTITIONER

## 2021-07-08 PROCEDURE — 1123F ACP DISCUSS/DSCN MKR DOCD: CPT | Performed by: NURSE PRACTITIONER

## 2021-07-08 PROCEDURE — G8427 DOCREV CUR MEDS BY ELIG CLIN: HCPCS | Performed by: NURSE PRACTITIONER

## 2021-07-08 PROCEDURE — 4040F PNEUMOC VAC/ADMIN/RCVD: CPT | Performed by: NURSE PRACTITIONER

## 2021-07-08 PROCEDURE — G8926 SPIRO NO PERF OR DOC: HCPCS | Performed by: NURSE PRACTITIONER

## 2021-07-08 PROCEDURE — 1036F TOBACCO NON-USER: CPT | Performed by: NURSE PRACTITIONER

## 2021-07-08 PROCEDURE — 3023F SPIROM DOC REV: CPT | Performed by: NURSE PRACTITIONER

## 2021-07-08 PROCEDURE — 99214 OFFICE O/P EST MOD 30 MIN: CPT | Performed by: NURSE PRACTITIONER

## 2021-07-08 PROCEDURE — G8420 CALC BMI NORM PARAMETERS: HCPCS | Performed by: NURSE PRACTITIONER

## 2021-07-08 NOTE — PROGRESS NOTES
[facility]     Outpatient Follow Up Note  Dr Bebe Whitehead MD,  Brad Schulte APRN,CNP CVNP      CHIEF COMPLAINT / HPI:  Follow Up   Ximena Horne is 80 y.o. male who presents today with a hx CAD / 2 vessel CABG in 8/2019  Has chronic atypical muscular discomfort with twinge no worse with activity, or breathing  No c/o pain with activity / no c/o edema/PND   Neg stress test 4/9/2021  HLD optimal LDL   Neg chest xray 9/2021  TTE 4/2021 unremarkable   State had COVID vaccines     Interval history: VSS still has occas. twinge in chest no c/o pain with breathing or typical angina    Discussed all test again/ emotional support given   May take tylenol as needed   complaint with meds   Discussed nutrition / sodium intake / fluid intake   Recommend activity as tolerated       Last EKG Echo:Stress Test:if any any past Angiograms: last labs; reviewed with pt. / the results are utilized to determine my plan of care. 20-29 minutes with pt including reviewing tests/meds/plan of care       Past Medical History:   Diagnosis Date    CAD (coronary artery disease)     Hyperlipidemia      Social History:    Social History     Tobacco Use   Smoking Status Former Smoker    Packs/day: 1.00    Years: 2.00    Pack years: 2.00    Types: Cigarettes    Quit date: 2/22/2018    Years since quitting: 3.3   Smokeless Tobacco Never Used     Current Medications:  Current Outpatient Medications   Medication Sig Dispense Refill    triazolam (HALCION) 0.25 MG tablet Take 1 tablet by mouth nightly as needed (sleep) for up to 30 days.  30 tablet 0    meclizine (ANTIVERT) 25 MG tablet Take 1 tablet by mouth 3 times daily as needed for Dizziness 30 tablet 1    metoprolol succinate (TOPROL XL) 25 MG extended release tablet Take 1 tablet by mouth daily 90 tablet 3    rosuvastatin (CRESTOR) 20 MG tablet Take 20 mg by mouth daily      pantoprazole (PROTONIX) 40 MG tablet Take 40 mg by mouth daily      aspirin 81 MG EC tablet Take 81 mg by mouth daily      Ascorbic Acid (VITAMIN C) 500 MG CAPS Take 1 capsule by mouth daily      Multiple Vitamins-Minerals (MULTIVITAMIN ADULTS 50+) TABS Take by mouth daily      Probiotic Product (PROBIOTIC-10 PO) Take by mouth daily      clonazePAM (KLONOPIN) 0.5 MG tablet Take 0.5 mg by mouth daily as needed.  albuterol sulfate HFA (VENTOLIN HFA) 108 (90 Base) MCG/ACT inhaler Inhale 2 puffs into the lungs every 6 hours as needed for Wheezing      Simethicone (GAS-X PO) Take by mouth as needed      acetaminophen (TYLENOL) 325 MG tablet Take 650 mg by mouth every 6 hours as needed for Pain      Lido-Menthol-Methyl Sal-Camph (CBD KINGS EX) Apply topically daily      vitamin B-12 (CYANOCOBALAMIN) 1000 MCG tablet Take 1,000 mcg by mouth daily      sucralfate (CARAFATE) 1 GM tablet Take 1 g by mouth as needed       No current facility-administered medications for this visit. REVIEW OF SYSTEMS:    CONSTITUTIONAL: No major weight gain or loss, night sweats, fever, fatigue, or weakness. HEENT: No new vision difficulties or ringing in the ears. RESPIRATORY: No new SOB, PND, orthopnea or cough. CARDIOVASCULAR: See HPI  GI: No N/V/D, constipation, or abdominal pain. No black/tarry stools  : No urinary urgency, incontinence, or hematuria. SKIN: No cyanosis or skin lesions. MUSCULOSKELETAL: No new muscle or joint pain. NEUROLOGICAL: No syncope or TIA-like symptoms.   PSYCHIATRIC: No anxiety, pain, insomnia or depression        Objective:   PHYSICAL EXAM:        Vitals:    07/08/21 0955   BP: 120/72   Site: Left Upper Arm   Position: Sitting   Cuff Size: Medium Adult   Pulse: 78   SpO2: 98%   Weight: 183 lb (83 kg)   Height: 6' 1\" (1.854 m)      VITALS:  /72 (Site: Left Upper Arm, Position: Sitting, Cuff Size: Medium Adult)   Pulse 78   Ht 6' 1\" (1.854 m)   Wt 183 lb (83 kg)   SpO2 98%   BMI 24.14 kg/m²   CONSTITUTIONAL: Cooperative, no apparent distress, and appears well nourished / developed  NEUROLOGIC:  Awake and orientated to person, place, and time. PSYCH: Calm affect. SKIN: Warm and dry. HEENT: Sclera non-icteric, normocephalic, neck supple. RESPIRATORY:  No increased work of breathing and clear to auscultation, no crackles or wheezing. CARDIOVASCULAR:  Regular rate and rhythm without murmur. Normal S1 and S2. No gallops or rubs. Normal PMI. No elevation of JVP. Normal carotid pulses with no bruits. GI:  Normal bowel sounds. Non-distended. Non-tender to palpation  EXT: No edema. No calf tenderness. Pulses are present bilaterally.     Wt Readings from Last 3 Encounters:   07/08/21 183 lb (83 kg)   06/27/21 176 lb (79.8 kg)   04/28/21 176 lb 9.6 oz (80.1 kg)      Pulse Readings from Last 3 Encounters:   07/08/21 78   06/27/21 55   04/28/21 68     BP Readings from Last 3 Encounters:   07/08/21 120/72   06/27/21 126/78   04/28/21 110/74        DATA:    Lab Results   Component Value Date    ALT 22 02/23/2021    AST 21 02/23/2021    ALKPHOS 68 02/23/2021    BILITOT 0.3 02/23/2021     Lab Results   Component Value Date    CREATININE 0.8 06/27/2021    BUN 22 (H) 06/27/2021     06/27/2021    K 3.9 06/27/2021     06/27/2021    CO2 26 06/27/2021       Lab Results   Component Value Date    WBC 5.9 06/27/2021    HGB 14.6 06/27/2021    HCT 41.7 06/27/2021    MCV 93.6 06/27/2021     06/27/2021     No components found for: CHLPL  Lab Results   Component Value Date    TRIG 139 02/23/2021     Lab Results   Component Value Date    HDL 51 02/23/2021     Lab Results   Component Value Date    LDLCALC 29 02/23/2021     Lab Results   Component Value Date    LABVLDL 28 02/23/2021     Radiology Review:  Pertinent images / reports were reviewed as a part of this visit and reveals the following:    Assessment:     CAD / 2 vessel CABG in 8/2019  Has chronic atypical muscular discomfort with twinge no worse with activity, or breathing  No c/o pain with activity / no c/o edema/PND   Neg stress

## 2021-07-27 ENCOUNTER — OFFICE VISIT (OUTPATIENT)
Dept: INTERNAL MEDICINE CLINIC | Age: 71
End: 2021-07-27
Payer: MEDICARE

## 2021-07-27 VITALS
BODY MASS INDEX: 23.83 KG/M2 | DIASTOLIC BLOOD PRESSURE: 78 MMHG | SYSTOLIC BLOOD PRESSURE: 124 MMHG | RESPIRATION RATE: 16 BRPM | HEIGHT: 73 IN | WEIGHT: 179.8 LBS | HEART RATE: 66 BPM

## 2021-07-27 DIAGNOSIS — I25.10 CORONARY ARTERY DISEASE INVOLVING NATIVE CORONARY ARTERY OF NATIVE HEART WITHOUT ANGINA PECTORIS: ICD-10-CM

## 2021-07-27 DIAGNOSIS — J43.2 CENTRILOBULAR EMPHYSEMA (HCC): ICD-10-CM

## 2021-07-27 DIAGNOSIS — R35.0 BENIGN PROSTATIC HYPERPLASIA WITH URINARY FREQUENCY: ICD-10-CM

## 2021-07-27 DIAGNOSIS — E78.5 HYPERLIPIDEMIA LDL GOAL <70: Primary | ICD-10-CM

## 2021-07-27 DIAGNOSIS — F51.01 PRIMARY INSOMNIA: ICD-10-CM

## 2021-07-27 DIAGNOSIS — N40.1 BENIGN PROSTATIC HYPERPLASIA WITH URINARY FREQUENCY: ICD-10-CM

## 2021-07-27 DIAGNOSIS — J30.1 ALLERGIC RHINITIS DUE TO POLLEN, UNSPECIFIED SEASONALITY: ICD-10-CM

## 2021-07-27 DIAGNOSIS — K21.9 GASTROESOPHAGEAL REFLUX DISEASE WITHOUT ESOPHAGITIS: ICD-10-CM

## 2021-07-27 PROCEDURE — 4040F PNEUMOC VAC/ADMIN/RCVD: CPT | Performed by: INTERNAL MEDICINE

## 2021-07-27 PROCEDURE — G8427 DOCREV CUR MEDS BY ELIG CLIN: HCPCS | Performed by: INTERNAL MEDICINE

## 2021-07-27 PROCEDURE — 1123F ACP DISCUSS/DSCN MKR DOCD: CPT | Performed by: INTERNAL MEDICINE

## 2021-07-27 PROCEDURE — 1036F TOBACCO NON-USER: CPT | Performed by: INTERNAL MEDICINE

## 2021-07-27 PROCEDURE — 99214 OFFICE O/P EST MOD 30 MIN: CPT | Performed by: INTERNAL MEDICINE

## 2021-07-27 PROCEDURE — G8420 CALC BMI NORM PARAMETERS: HCPCS | Performed by: INTERNAL MEDICINE

## 2021-07-27 PROCEDURE — G8926 SPIRO NO PERF OR DOC: HCPCS | Performed by: INTERNAL MEDICINE

## 2021-07-27 PROCEDURE — 3023F SPIROM DOC REV: CPT | Performed by: INTERNAL MEDICINE

## 2021-07-27 PROCEDURE — 3017F COLORECTAL CA SCREEN DOC REV: CPT | Performed by: INTERNAL MEDICINE

## 2021-07-27 RX ORDER — CLINDAMYCIN PHOSPHATE 10 MG/G
GEL TOPICAL
COMMUNITY
Start: 2021-05-25 | End: 2021-09-09 | Stop reason: SDUPTHER

## 2021-07-27 RX ORDER — PANTOPRAZOLE SODIUM 40 MG/1
40 TABLET, DELAYED RELEASE ORAL DAILY
Qty: 90 TABLET | Refills: 1 | Status: SHIPPED | OUTPATIENT
Start: 2021-07-27 | End: 2022-01-11

## 2021-07-27 SDOH — ECONOMIC STABILITY: FOOD INSECURITY: WITHIN THE PAST 12 MONTHS, YOU WORRIED THAT YOUR FOOD WOULD RUN OUT BEFORE YOU GOT MONEY TO BUY MORE.: NEVER TRUE

## 2021-07-27 SDOH — ECONOMIC STABILITY: FOOD INSECURITY: WITHIN THE PAST 12 MONTHS, THE FOOD YOU BOUGHT JUST DIDN'T LAST AND YOU DIDN'T HAVE MONEY TO GET MORE.: NEVER TRUE

## 2021-07-27 ASSESSMENT — PATIENT HEALTH QUESTIONNAIRE - PHQ9
SUM OF ALL RESPONSES TO PHQ QUESTIONS 1-9: 0
1. LITTLE INTEREST OR PLEASURE IN DOING THINGS: 0
2. FEELING DOWN, DEPRESSED OR HOPELESS: 0
SUM OF ALL RESPONSES TO PHQ QUESTIONS 1-9: 0
SUM OF ALL RESPONSES TO PHQ9 QUESTIONS 1 & 2: 0
SUM OF ALL RESPONSES TO PHQ QUESTIONS 1-9: 0

## 2021-07-27 ASSESSMENT — ENCOUNTER SYMPTOMS
ALLERGIC/IMMUNOLOGIC NEGATIVE: 1
CHEST TIGHTNESS: 1

## 2021-07-27 ASSESSMENT — SOCIAL DETERMINANTS OF HEALTH (SDOH): HOW HARD IS IT FOR YOU TO PAY FOR THE VERY BASICS LIKE FOOD, HOUSING, MEDICAL CARE, AND HEATING?: NOT HARD AT ALL

## 2021-07-27 NOTE — ASSESSMENT & PLAN NOTE
Borderline controlled, continue current medications and lifestyle modifications recommended  Needs to watch diet cont meds

## 2021-07-27 NOTE — ASSESSMENT & PLAN NOTE
Well-controlled, continue current medications and lifestyle modifications recommended neg W/U in ER as noted cont med to see Dr Trupti Leiva 9/21 will check labs then

## 2021-07-27 NOTE — ASSESSMENT & PLAN NOTE
Well-controlled, continue current medications and lifestyle modifications recommended cont Tx with Flonase,Claritin and Mucinex DM to change to Zyrtec may help sleep

## 2021-07-27 NOTE — PROGRESS NOTES
Subjective:      Patient ID: Radha Jackson is a 80 y.o. male. HPI  Here today for follow up of chronic problems as per HPI and as problems listed under assessment and plan,no new c/o feels good was in ER 1 mo ago for CP W/U was neg. Also saw cardiology again neg. W/U confirmed here today for F/U      Hyperlipidemia  This is a chronic problem. The current episode started more than 1 year ago. The problem is controlled. Recent lipid tests were reviewed and are normal. Pertinent negatives include no chest pain. Current antihyperlipidemic treatment includes diet change, statins and exercise. The current treatment provides mild improvement of lipids. There are no compliance problems. Risk factors for coronary artery disease include male sex (known CAD ). Coronary Artery Disease  Presents for follow-up visit. Symptoms include chest tightness and dizziness. Pertinent negatives include no chest pain, chest pressure, leg swelling or palpitations. Risk factors include hyperlipidemia. The symptoms have been stable. Compliance with diet is good. Compliance with exercise is variable. Compliance with medications is good. Allergies   Allergen Reactions    Adhesive Tape Rash       Current Outpatient Medications   Medication Sig Dispense Refill    clindamycin (CLINDAGEL) 1 % gel Prn      pantoprazole (PROTONIX) 40 MG tablet Take 1 tablet by mouth daily 90 tablet 1    triazolam (HALCION) 0.25 MG tablet Take 1 tablet by mouth nightly as needed (sleep) for up to 30 days.  30 tablet 0    meclizine (ANTIVERT) 25 MG tablet Take 1 tablet by mouth 3 times daily as needed for Dizziness 30 tablet 1    metoprolol succinate (TOPROL XL) 25 MG extended release tablet Take 1 tablet by mouth daily 90 tablet 3    rosuvastatin (CRESTOR) 20 MG tablet Take 20 mg by mouth daily      aspirin 81 MG EC tablet Take 81 mg by mouth daily      Ascorbic Acid (VITAMIN C) 500 MG CAPS Take 1 capsule by mouth daily      Multiple Vitamins-Minerals (MULTIVITAMIN ADULTS 50+) TABS Take by mouth daily      Probiotic Product (PROBIOTIC-10 PO) Take by mouth daily      clonazePAM (KLONOPIN) 0.5 MG tablet Take 0.5 mg by mouth daily as needed.  albuterol sulfate HFA (VENTOLIN HFA) 108 (90 Base) MCG/ACT inhaler Inhale 2 puffs into the lungs every 6 hours as needed for Wheezing      Simethicone (GAS-X PO) Take by mouth as needed      acetaminophen (TYLENOL) 325 MG tablet Take 650 mg by mouth every 6 hours as needed for Pain      Lido-Menthol-Methyl Sal-Camph (CBD KINGS EX) Apply topically daily      vitamin B-12 (CYANOCOBALAMIN) 1000 MCG tablet Take 1,000 mcg by mouth daily      sucralfate (CARAFATE) 1 GM tablet Take 1 g by mouth as needed       No current facility-administered medications for this visit. Past Medical History:   Diagnosis Date    CAD (coronary artery disease)     Hyperlipidemia        Family History   Problem Relation Age of Onset    Diabetes Mother     Heart Disease Father     Coronary Art Dis Brother        Past Surgical History:   Procedure Laterality Date    COLONOSCOPY      05/05/2017, diverticulosis, repeat 5/15/2026 Dr. Daron Marshall  2019    2 Vessels     PROSTATE SURGERY  2017    TURP       Social History     Socioeconomic History    Marital status: Single     Spouse name: Not on file    Number of children: Not on file    Years of education: Not on file    Highest education level: Not on file   Occupational History    Not on file   Tobacco Use    Smoking status: Former Smoker     Packs/day: 1.00     Years: 2.00     Pack years: 2.00     Types: Cigarettes     Quit date: 2/22/2018     Years since quitting: 3.4    Smokeless tobacco: Never Used   Substance and Sexual Activity    Alcohol use:  Yes    Drug use: Not Currently    Sexual activity: Not on file   Other Topics Concern    Not on file   Social History Narrative    Not on file     Social Determinants of Health Financial Resource Strain: Low Risk     Difficulty of Paying Living Expenses: Not hard at all   Food Insecurity: No Food Insecurity    Worried About Running Out of Food in the Last Year: Never true    Mariluz of Food in the Last Year: Never true   Transportation Needs:     Lack of Transportation (Medical):  Lack of Transportation (Non-Medical):    Physical Activity:     Days of Exercise per Week:     Minutes of Exercise per Session:    Stress:     Feeling of Stress :    Social Connections:     Frequency of Communication with Friends and Family:     Frequency of Social Gatherings with Friends and Family:     Attends [employer] Services:     Active Member of Clubs or Organizations:     Attends Club or Organization Meetings:     Marital Status:    Intimate Partner Violence:     Fear of Current or Ex-Partner:     Emotionally Abused:     Physically Abused:     Sexually Abused:        Review of Systems  Review of Systems   Constitutional: Negative for unexpected weight change. HENT: Positive for congestion and postnasal drip. Middle ear dizziness Tx with prn Klonapin and anitvert    Eyes:        Glasses  Has floaters    Respiratory: Positive for chest tightness. Cardiovascular: Negative for chest pain, palpitations and leg swelling. Gastrointestinal:        GERD cont diet and meds    Genitourinary: Positive for decreased urine volume. Nocturia s/p TURP    Musculoskeletal: Positive for arthralgias (in hands ). Allergic/Immunologic: Negative. Neurological: Positive for dizziness. Psychiatric/Behavioral: Positive for sleep disturbance (has taken Halcion for years ). Objective:   Physical Exam:  Physical Exam  Constitutional:       Appearance: Normal appearance. HENT:      Head: Normocephalic and atraumatic.       Right Ear: Tympanic membrane, ear canal and external ear normal.      Left Ear: Tympanic membrane, ear canal and external ear normal.      Nose: Nose normal.   Eyes:      Extraocular Movements: Extraocular movements intact. Conjunctiva/sclera: Conjunctivae normal.      Pupils: Pupils are equal, round, and reactive to light. Cardiovascular:      Rate and Rhythm: Normal rate and regular rhythm. Pulses: Normal pulses. Heart sounds: Normal heart sounds. Pulmonary:      Effort: Pulmonary effort is normal.      Breath sounds: Normal breath sounds. Abdominal:      General: Abdomen is flat. Bowel sounds are normal.      Palpations: Abdomen is soft. Genitourinary:     Penis: Normal.       Prostate: Normal.      Rectum: Normal.   Musculoskeletal:         General: Normal range of motion. Cervical back: Normal range of motion. Skin:     General: Skin is warm and dry. Neurological:      General: No focal deficit present. Mental Status: He is alert and oriented to person, place, and time. Mental status is at baseline. Psychiatric:         Mood and Affect: Mood normal.         Thought Content:  Thought content normal.         /78 (Site: Right Upper Arm, Position: Sitting, Cuff Size: Medium Adult)   Pulse 66   Resp 16   Ht 6' 1\" (1.854 m)   Wt 179 lb 12.8 oz (81.6 kg)   BMI 23.72 kg/m²       Assessment & Plan:         Pulmonary emphysema (HCC)  occ symptoms prn Albuterol last CXR was ok     Primary insomnia  On and off problem cont good sleep mechanics and meds as noted add melatonin prn     Hyperlipidemia LDL goal <70   At goal, continue current medications and lifestyle modifications recommended check labs in 2 mo     Gastroesophageal reflux disease without esophagitis   Borderline controlled, continue current medications and lifestyle modifications recommended  Needs to watch diet cont meds     Coronary artery disease involving native coronary artery   Well-controlled, continue current medications and lifestyle modifications recommended neg W/U in ER as noted cont med to see Dr Renato Lee 9/21 will check labs then     Benign prostatic hyperplasia with lower urinary tract symptoms  Stable for now  nocturia 1-2/ nited     Allergic rhinitis   Well-controlled, continue current medications and lifestyle modifications recommended cont Tx with Flonase,Claritin and Mucinex DM to change to Zyrtec may help sleep

## 2021-08-05 DIAGNOSIS — F51.01 PRIMARY INSOMNIA: Primary | ICD-10-CM

## 2021-08-26 ENCOUNTER — TELEPHONE (OUTPATIENT)
Dept: INTERNAL MEDICINE CLINIC | Age: 71
End: 2021-08-26

## 2021-08-26 DIAGNOSIS — F41.9 ANXIETY: Primary | ICD-10-CM

## 2021-08-26 RX ORDER — MECLIZINE HYDROCHLORIDE 25 MG/1
25 TABLET ORAL 3 TIMES DAILY PRN
Qty: 30 TABLET | Refills: 1 | Status: SHIPPED | OUTPATIENT
Start: 2021-08-26 | End: 2021-11-12

## 2021-08-26 RX ORDER — CLONAZEPAM 0.5 MG/1
0.5 TABLET ORAL DAILY PRN
Qty: 30 TABLET | Refills: 0 | Status: SHIPPED | OUTPATIENT
Start: 2021-08-26 | End: 2022-03-16 | Stop reason: SDUPTHER

## 2021-08-26 NOTE — TELEPHONE ENCOUNTER
Medication Refill:     meclizine (ANTIVERT) 25 MG tablet [[id number]    clonazePAM (KLONOPIN) 0.5 MG tablet [[id number]    [facility]/pharmacy [place] [address] [place] [address]. - P [phone] - F [phone]  [phone]

## 2021-08-26 NOTE — TELEPHONE ENCOUNTER
OARRS completed on patient. No results found on patient. Pt saw a his internist in [place] (January 2021) and no longer sees him. Patient advised scripts will be sent.

## 2021-09-09 DIAGNOSIS — F51.01 PRIMARY INSOMNIA: ICD-10-CM

## 2021-09-09 RX ORDER — CLINDAMYCIN PHOSPHATE 10 MG/G
GEL TOPICAL
Qty: 30 G | Refills: 3 | Status: SHIPPED | OUTPATIENT
Start: 2021-09-09 | End: 2022-01-07

## 2021-09-09 NOTE — TELEPHONE ENCOUNTER
----- Message from Esme Clifford sent at 9/9/2021  9:13 AM EDT -----  Subject: Refill Request    QUESTIONS  Name of Medication? triazolam (HALCION) 0.25 MG tablet  Patient-reported dosage and instructions? 1 daily  How many days do you have left? 2  Preferred Pharmacy? [facility]/PHARMACY #[id number]  Pharmacy phone number (if available)? [phone]  ---------------------------------------------------------------------------  --------------,  Name of Medication? clindamycin (CLINDAGEL) 1 % gel  Patient-reported dosage and instructions? 1 daily  How many days do you have left? 1  Preferred Pharmacy? [facility]/PHARMACY #[id number]  Pharmacy phone number (if available)? [phone]  ---------------------------------------------------------------------------  --------------,  Name of Medication? triazolam (HALCION) 0.25 MG tablet  Patient-reported dosage and instructions? 1 daily  How many days do you have left? 2  Preferred Pharmacy? [facility]/PHARMACY #[id number]  Pharmacy phone number (if available)? [phone]  ---------------------------------------------------------------------------  --------------  Serina RIOS  What is the best way for the office to contact you? OK to leave message on   voicemail  Preferred Call Back Phone Number?  [phone]

## 2021-09-29 ENCOUNTER — OFFICE VISIT (OUTPATIENT)
Dept: CARDIOLOGY CLINIC | Age: 71
End: 2021-09-29
Payer: MEDICARE

## 2021-09-29 VITALS
OXYGEN SATURATION: 96 % | WEIGHT: 184 LBS | HEART RATE: 52 BPM | HEIGHT: 73 IN | SYSTOLIC BLOOD PRESSURE: 118 MMHG | DIASTOLIC BLOOD PRESSURE: 72 MMHG | BODY MASS INDEX: 24.39 KG/M2

## 2021-09-29 DIAGNOSIS — R07.9 CHEST PAIN, UNSPECIFIED TYPE: Primary | ICD-10-CM

## 2021-09-29 DIAGNOSIS — J43.8 OTHER EMPHYSEMA (HCC): ICD-10-CM

## 2021-09-29 DIAGNOSIS — E78.2 MIXED HYPERLIPIDEMIA: ICD-10-CM

## 2021-09-29 DIAGNOSIS — I25.119 CORONARY ARTERY DISEASE INVOLVING NATIVE CORONARY ARTERY OF NATIVE HEART WITH ANGINA PECTORIS (HCC): Primary | ICD-10-CM

## 2021-09-29 DIAGNOSIS — Z95.1 S/P CABG X 2: ICD-10-CM

## 2021-09-29 PROCEDURE — G8427 DOCREV CUR MEDS BY ELIG CLIN: HCPCS | Performed by: INTERNAL MEDICINE

## 2021-09-29 PROCEDURE — G8926 SPIRO NO PERF OR DOC: HCPCS | Performed by: INTERNAL MEDICINE

## 2021-09-29 PROCEDURE — 3017F COLORECTAL CA SCREEN DOC REV: CPT | Performed by: INTERNAL MEDICINE

## 2021-09-29 PROCEDURE — 99214 OFFICE O/P EST MOD 30 MIN: CPT | Performed by: INTERNAL MEDICINE

## 2021-09-29 PROCEDURE — 4040F PNEUMOC VAC/ADMIN/RCVD: CPT | Performed by: INTERNAL MEDICINE

## 2021-09-29 PROCEDURE — 1123F ACP DISCUSS/DSCN MKR DOCD: CPT | Performed by: INTERNAL MEDICINE

## 2021-09-29 PROCEDURE — G8420 CALC BMI NORM PARAMETERS: HCPCS | Performed by: INTERNAL MEDICINE

## 2021-09-29 PROCEDURE — 1036F TOBACCO NON-USER: CPT | Performed by: INTERNAL MEDICINE

## 2021-09-29 PROCEDURE — 3023F SPIROM DOC REV: CPT | Performed by: INTERNAL MEDICINE

## 2021-09-29 NOTE — PROGRESS NOTES
80 y.o. here for f/u. Moved here from [place], [place]. Has children and grandchildren here. Has h/o CAD and had CABG in 8/2019.   3 mo ago, had seconds of pain in the chest that came and went. Calls it a \"catch\" that he had had previously in the back. Started working again, working at [employer]. No other cp, just the \"mucle-related\" pain that \"I normally have. \"  No unusual sob. No n/v/LH/dizziness. No syncope. Every once in awhile has some GEORGES if carrying boxes or something. Past Surgical History:   Procedure Laterality Date    COLONOSCOPY      05/05/2017, diverticulosis, repeat 5/15/2026 Dr. [place] Ni  2019    2 Vessels     PROSTATE SURGERY  2017    TURP     Social History     Tobacco Use    Smoking status: Former Smoker     Packs/day: 1.00     Years: 2.00     Pack years: 2.00     Types: Cigarettes     Quit date: 2/22/2018     Years since quitting: 3.6    Smokeless tobacco: Never Used   Substance Use Topics    Alcohol use: Yes    Drug use: Not Currently     Allergies   Allergen Reactions    Adhesive Tape Rash     Family History   Problem Relation Age of Onset    Diabetes Mother     Heart Disease Father     Coronary Art Dis Brother      Review of Systems   General: No fevers, chills, fatigue, or night sweats. No abnormal changes in weight. HEENT: No blurry or decreased vision. No changes in hearing, nasal discharge or sore throat. Cardiovascular: See HPI. No cramping in legs or buttocks when walking. Respiratory: No cough, hemoptysis, or wheezing. No history of asthma. Gastrointestinal: No abdominal pain, hematochezia, melana, or history of GI ulcers. Genito-Urinary: No dysuria or hematuria. No urgency or polyuria. Musculoskeletal: No complaints of joint pain, joint swelling or muscular weakness/soreness. Neurological: No dizziness or headaches. No numbness/tingling, speech problems or weakness. No history of a stroke or TIA.    Psychological: No anxiety or depression  Hematological and Lymphatic: No abnormal bleeding or bruising, blood clots, jaundice. Endocrine: No malaise/lethargy, palpitations, polydipsia/polyuria, temperature intolerance or unexpected weight changes. Skin: No rashes or non-healing ulcers. PE:  Blood pressure 118/72, pulse 52, height 6' 1\" (1.854 m), weight 184 lb (83.5 kg), SpO2 96 %. General (appearance): Well devel. No distress  Eyes: anicteric. EOMI  Neck: Supple. No jvd  Ears/Nose/Mouth/Thorat: No cyanosis  CV: RRR. No m/r/g   Respiratory:  Clear b, normal respiratory effort  GI: abd s/nt/nd  Skin: Warm, dry. No rashes  Neuro/Psych: Alert and oriented x 3. Appropriate behavior  Ext:  No c/c. No edema  Pulses:  2+ radial    Lab Results   Component Value Date    WBC 6.2 09/04/2021    HGB 15.2 09/04/2021    HCT 44.7 09/04/2021    MCV 94.6 09/04/2021     09/04/2021     Lab Results   Component Value Date     (H) 09/04/2021    K 4.3 09/04/2021     09/04/2021    CO2 28 09/04/2021    BUN 16 09/04/2021    CREATININE 0.8 09/04/2021    GLUCOSE 101 (H) 09/04/2021    CALCIUM 9.6 09/04/2021    PROT 7.0 09/04/2021    LABALBU 4.6 09/04/2021    BILITOT 0.7 09/04/2021    ALKPHOS 72 09/04/2021    AST 21 09/04/2021    ALT 20 09/04/2021    LABGLOM >60 09/04/2021    GFRAA >60 09/04/2021    AGRATIO 1.9 09/04/2021    GLOB 2.4 09/04/2021     No results found for: INR, PROTIME    Lab Results   Component Value Date    CHOL 99 09/04/2021    CHOL 108 02/23/2021     Lab Results   Component Value Date    TRIG 111 09/04/2021    TRIG 139 02/23/2021     Lab Results   Component Value Date    HDL 52 09/04/2021    HDL 51 02/23/2021     Lab Results   Component Value Date    LDLCALC 25 09/04/2021    LDLCALC 29 02/23/2021     Lab Results   Component Value Date    LABVLDL 22 09/04/2021    LABVLDL 28 02/23/2021     No results found for: CHOLHDLRATIO    4/2021 TTE: EF normal. Perhaps mild HK of the apical inferior wall.  Mild AI and TR> RVSP 24. 4/2021 Nuc Stress: No ischemia      Current Outpatient Medications:     clindamycin (CLINDAGEL) 1 % gel, Use daily prn, Disp: 30 g, Rfl: 3    triazolam (HALCION) 0.25 MG tablet, TAKE 1 TABLET BY MOUTH AT BEDTIME, Disp: 30 tablet, Rfl: 0    meclizine (ANTIVERT) 25 MG tablet, Take 1 tablet by mouth 3 times daily as needed for Dizziness, Disp: 30 tablet, Rfl: 1    pantoprazole (PROTONIX) 40 MG tablet, Take 1 tablet by mouth daily, Disp: 90 tablet, Rfl: 1    metoprolol succinate (TOPROL XL) 25 MG extended release tablet, Take 1 tablet by mouth daily, Disp: 90 tablet, Rfl: 3    rosuvastatin (CRESTOR) 20 MG tablet, Take 20 mg by mouth daily, Disp: , Rfl:     aspirin 81 MG EC tablet, Take 81 mg by mouth daily, Disp: , Rfl:     Ascorbic Acid (VITAMIN C) 500 MG CAPS, Take 1 capsule by mouth daily, Disp: , Rfl:     Multiple Vitamins-Minerals (MULTIVITAMIN ADULTS 50+) TABS, Take by mouth daily, Disp: , Rfl:     Probiotic Product (PROBIOTIC-10 PO), Take by mouth daily, Disp: , Rfl:     albuterol sulfate HFA (VENTOLIN HFA) 108 (90 Base) MCG/ACT inhaler, Inhale 2 puffs into the lungs every 6 hours as needed for Wheezing, Disp: , Rfl:     Simethicone (GAS-X PO), Take by mouth as needed, Disp: , Rfl:     acetaminophen (TYLENOL) 325 MG tablet, Take 650 mg by mouth every 6 hours as needed for Pain, Disp: , Rfl:     Lido-Menthol-Methyl Sal-Camph (CBD KINGS EX), Apply topically daily, Disp: , Rfl:     vitamin B-12 (CYANOCOBALAMIN) 1000 MCG tablet, Take 1,000 mcg by mouth daily, Disp: , Rfl:     sucralfate (CARAFATE) 1 GM tablet, Take 1 g by mouth as needed, Disp: , Rfl:     clonazePAM (KLONOPIN) 0.5 MG tablet, Take 1 tablet by mouth daily as needed for Anxiety for up to 30 days. , Disp: 30 tablet, Rfl: 0    A/P:  80 y.o. here for CAD. Had CABG x 2 in [address] in 2019. Issues:  1. Coronary artery disease involving native coronary artery of native heart with [facility]ina pectoris [facility]    2. S/P CABG x 2    3.  Mixed hyperlipidemia    4.  Other emphysema ([facility]        Recs:  -Start Toprol 25 mg daily  -Crestor  -ASA  F/U 6 mo

## 2021-10-13 ENCOUNTER — OFFICE VISIT (OUTPATIENT)
Dept: CARDIOTHORACIC SURGERY | Age: 71
End: 2021-10-13
Payer: MEDICARE

## 2021-10-13 VITALS
BODY MASS INDEX: 24.25 KG/M2 | SYSTOLIC BLOOD PRESSURE: 142 MMHG | OXYGEN SATURATION: 98 % | WEIGHT: 183 LBS | DIASTOLIC BLOOD PRESSURE: 72 MMHG | TEMPERATURE: 97.9 F | HEART RATE: 48 BPM | HEIGHT: 73 IN

## 2021-10-13 DIAGNOSIS — R07.9 CHEST PAIN OF UNKNOWN ETIOLOGY: Primary | ICD-10-CM

## 2021-10-13 PROCEDURE — 1036F TOBACCO NON-USER: CPT | Performed by: THORACIC SURGERY (CARDIOTHORACIC VASCULAR SURGERY)

## 2021-10-13 PROCEDURE — G8427 DOCREV CUR MEDS BY ELIG CLIN: HCPCS | Performed by: THORACIC SURGERY (CARDIOTHORACIC VASCULAR SURGERY)

## 2021-10-13 PROCEDURE — G8484 FLU IMMUNIZE NO ADMIN: HCPCS | Performed by: THORACIC SURGERY (CARDIOTHORACIC VASCULAR SURGERY)

## 2021-10-13 PROCEDURE — 1123F ACP DISCUSS/DSCN MKR DOCD: CPT | Performed by: THORACIC SURGERY (CARDIOTHORACIC VASCULAR SURGERY)

## 2021-10-13 PROCEDURE — 99204 OFFICE O/P NEW MOD 45 MIN: CPT | Performed by: THORACIC SURGERY (CARDIOTHORACIC VASCULAR SURGERY)

## 2021-10-13 PROCEDURE — G8420 CALC BMI NORM PARAMETERS: HCPCS | Performed by: THORACIC SURGERY (CARDIOTHORACIC VASCULAR SURGERY)

## 2021-10-13 PROCEDURE — 4040F PNEUMOC VAC/ADMIN/RCVD: CPT | Performed by: THORACIC SURGERY (CARDIOTHORACIC VASCULAR SURGERY)

## 2021-10-13 PROCEDURE — 3017F COLORECTAL CA SCREEN DOC REV: CPT | Performed by: THORACIC SURGERY (CARDIOTHORACIC VASCULAR SURGERY)

## 2021-10-13 ASSESSMENT — ENCOUNTER SYMPTOMS
GASTROINTESTINAL NEGATIVE: 1
EYES NEGATIVE: 1
SHORTNESS OF BREATH: 1
CHEST TIGHTNESS: 0
ALLERGIC/IMMUNOLOGIC NEGATIVE: 1

## 2021-10-13 NOTE — PROGRESS NOTES
Subjective:      Patient ID: Skye Maria is a 80 y.o. male. Chief Complaint   Patient presents with   [facility] Patient     Mr. Darrick Quinn is being seen today at the request of Dr. 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 HPI Mr. Darrick Quinn had coronary bypass surgery performed in [facility] a little over 2 years ago. Since then he has had persistent discomfort of varying types in his anterior chest.  Most recently he has had some intermittent burning in his right upper chest with no radiation down into his right arm. He says in the past he has had of the left side of his upper chest as well but that has dissipated. He denies any clicking or popping sensation of his chest with normal breathing, coughing or sneezing. Otherwise normally active. He tells me that he has been routinely evaluated by cardiology to assess his cardiac status and that that is always been good since his surgery. He said no history of any injury or fall since the time of surgery. He also denies any history of infection at the time of his original surgery. Review of Systems   Constitutional: Negative. HENT: Negative. Eyes: Negative. Wears glasses   Respiratory: Positive for shortness of breath (Occ. w/exertion). Negative for chest tightness. Cardiovascular: Positive for chest pain. Negative for palpitations and leg swelling. Having intermittent chest pressure/burning   Gastrointestinal: Negative. Endocrine: Negative. Genitourinary: Negative. Musculoskeletal: Negative. Skin: Negative. Allergic/Immunologic: Negative. Neurological: Negative. Hematological: Negative. Psychiatric/Behavioral: Positive for sleep disturbance. The patient is nervous/anxious.       Past Medical History:   Diagnosis Date    Acid reflux     Asthma     CAD (coronary artery disease)     COPD (chronic obstructive pulmonary disease) (HCC)     mild    Hyperlipidemia     Vertigo      Past Surgical History:   Procedure Laterality Date    COLONOSCOPY      05/05/2017, diverticulosis, repeat 5/15/2026 Dr. Ha Grimaldo  2019    2 Vessels     ESOPHAGEAL DILATATION      INGUINAL HERNIA REPAIR Right     PROSTATE SURGERY  2017    TURP     Allergies   Allergen Reactions    Adhesive Tape Rash     Current Outpatient Medications   Medication Sig Dispense Refill    triazolam (HALCION) 0.25 MG tablet Take 0.25 mg by mouth nightly as needed.  clindamycin (CLINDAGEL) 1 % gel Use daily prn 30 g 3    meclizine (ANTIVERT) 25 MG tablet Take 1 tablet by mouth 3 times daily as needed for Dizziness 30 tablet 1    pantoprazole (PROTONIX) 40 MG tablet Take 1 tablet by mouth daily 90 tablet 1    metoprolol succinate (TOPROL XL) 25 MG extended release tablet Take 1 tablet by mouth daily 90 tablet 3    rosuvastatin (CRESTOR) 20 MG tablet Take 20 mg by mouth daily      aspirin 81 MG EC tablet Take 81 mg by mouth daily      Ascorbic Acid (VITAMIN C) 500 MG CAPS Take 1 capsule by mouth daily      Multiple Vitamins-Minerals (MULTIVITAMIN ADULTS 50+) TABS Take by mouth daily      albuterol sulfate HFA (VENTOLIN HFA) 108 (90 Base) MCG/ACT inhaler Inhale 2 puffs into the lungs every 6 hours as needed for Wheezing      Simethicone (GAS-X PO) Take by mouth as needed      acetaminophen (TYLENOL) 325 MG tablet Take 650 mg by mouth every 6 hours as needed for Pain      Lido-Menthol-Methyl Sal-Camph (CBD KINGS EX) Apply topically daily      vitamin B-12 (CYANOCOBALAMIN) 1000 MCG tablet Take 1,000 mcg by mouth daily      sucralfate (CARAFATE) 1 GM tablet Take 1 g by mouth as needed      clonazePAM (KLONOPIN) 0.5 MG tablet Take 1 tablet by mouth daily as needed for Anxiety for up to 30 days. 30 tablet 0    Probiotic Product (PROBIOTIC-10 PO) Take by mouth daily (Patient not taking: Reported on 10/13/2021)       No current facility-administered medications for this visit.      Social History     Socioeconomic History  Marital status: Single     Spouse name: Not on file    Number of children: Not on file    Years of education: Not on file    Highest education level: Not on file   Occupational History    Not on file   Tobacco Use    Smoking status: Former Smoker     Packs/day: 1.00     Years: 50.00     Pack years: 50.00     Types: Cigarettes     Quit date: 2/22/2018     Years since quitting: 3.6    Smokeless tobacco: Never Used   Vaping Use    Vaping Use: Never used   Substance and Sexual Activity    Alcohol use: Not Currently     Comment: Rarely    Drug use: Not Currently    Sexual activity: Not on file   Other Topics Concern    Not on file   Social History Narrative    Not on file     Social Determinants of Health     Financial Resource Strain: Low Risk     Difficulty of Paying Living Expenses: Not hard at all   Food Insecurity: No Food Insecurity    Worried About Running Out of Food in the Last Year: Never true    Mariluz of Food in the Last Year: Never true   Transportation Needs:     Lack of Transportation (Medical):  Lack of Transportation (Non-Medical):    Physical Activity:     Days of Exercise per Week:     Minutes of Exercise per Session:    Stress:     Feeling of Stress :    Social Connections:     Frequency of Communication with Friends and Family:     Frequency of Social Gatherings with Friends and Family:     Attends Voodoo Services:     Active Member of Clubs or Organizations:     Attends Club or Organization Meetings:     Marital Status:    Intimate Partner Violence:     Fear of Current or Ex-Partner:     Emotionally Abused:     Physically Abused:     Sexually Abused:      Family History   Problem Relation Age of Onset    Diabetes Mother     Heart Disease Father     Coronary Art Dis Brother      Objective:   Physical Exam  Constitutional:       General: He is not in acute distress. Appearance: Normal appearance. He is well-developed and normal weight.  He is not diaphoretic. HENT:      Head: Normocephalic. Nose: Nose normal.   Eyes:      General: No scleral icterus. Conjunctiva/sclera: Conjunctivae normal.      Pupils: Pupils are equal, round, and reactive to light. Neck:      Thyroid: No thyromegaly. Vascular: No JVD. Trachea: No tracheal deviation. Cardiovascular:      Rate and Rhythm: Normal rate and regular rhythm. Pulses: Normal pulses. Heart sounds: Normal heart sounds. Pulmonary:      Effort: Pulmonary effort is normal.   Abdominal:      General: Abdomen is flat. There is no distension. Musculoskeletal:         General: No swelling or deformity. Normal range of motion. Cervical back: Normal range of motion. Comments: No clicking or popping could be felt with repetitive deep inspiration and expiration or strong cough. Sternal wires readily palpable. No palpable evidence of sternal instability. Skin:     General: Skin is warm and dry. Findings: No erythema or rash. Neurological:      General: No focal deficit present. Mental Status: He is alert and oriented to person, place, and time. Cranial Nerves: No cranial nerve deficit. Coordination: Coordination normal.   Psychiatric:         Mood and Affect: Mood normal.         Behavior: Behavior normal.         Thought Content: Thought content normal.         Judgment: Judgment normal.       Vitals:    10/13/21 0922 10/13/21 0928   BP: (!) 148/80 (!) 142/72   Site: Left Upper Arm Right Upper Arm   Position: Sitting Sitting   Pulse: (!) 48    Temp: 97.9 °F (36.6 °C)    TempSrc: Infrared    SpO2: 98%    Weight: 183 lb (83 kg)    Height: 6' 1\" (1.854 m)      Assessment:      Remote post CABG neuropathic chest discomfort. Plan:      Given that his postoperative cardiac assessment has shown his revascularization to be good, the origin of his atypical chest discomfort may be from 1 of several things.   It could be related to micromovement of the wires in and around his sternum. It could be from a subclinical nickel allergy to the nickel and the stainless steel wires. Or it could be from neurogenic origin secondary to some abnormal healing of his cutaneous nerves. From a surgical point of view, about the only thing I could offer is to remove the sternal wires and see what happens. This can be both diagnostic and therapeutic. If the wires were to come out and his chest discomfort were to resolve, then we would have our answer in retrospect. The wires were to come out and nothing were to change, and at least 1 variable has been removed from the differential diagnosis. I told him at this point that I was not going to recommend that he had to have anything done. There is a difference between needing to have something done and wanting to have something done. If he thinks that his discomfort merits the effort of taking out the wires to see if things improve thereafter, I will leave this decision up to him. To go home and sleep on things for as long as he needs to and decide what he would like to do. If you would like to proceed with removal of the surgical wires, all he needs to do is call my office to ask to have that arranged. He knows he is welcome to call or return at anytime in the future should he have any other new problems, questions or concerns for which we can be of help. Thank you very much for the opportunity to meet Mr. Kaushik Wyatt today for consultation. It was a pleasure meeting him.

## 2021-10-14 ENCOUNTER — TELEPHONE (OUTPATIENT)
Dept: INTERNAL MEDICINE CLINIC | Age: 71
End: 2021-10-14

## 2021-10-14 NOTE — TELEPHONE ENCOUNTER
Patient called in requesting refill for the following medication:    triazolam (HALCION) 0.25 MG tablet         [facility]/pharmacy [place] , [place] - [address] - P [phone] - F [phone]    Pls advise.

## 2021-10-16 DIAGNOSIS — F51.01 PRIMARY INSOMNIA: ICD-10-CM

## 2021-10-18 DIAGNOSIS — F51.01 PRIMARY INSOMNIA: Primary | ICD-10-CM

## 2021-10-18 NOTE — TELEPHONE ENCOUNTER
PT calling to get a refill for   triazolam (HALCION) 0.25 MG tablet 'there is a rx that was sent over that that the [facility] JANETT VALDIVIA said they didn't have it       PHARM:[facility]/pharmacy [place] - [place] [phone] - F [phone]

## 2021-11-03 ENCOUNTER — TELEPHONE (OUTPATIENT)
Dept: INTERNAL MEDICINE CLINIC | Age: 71
End: 2021-11-03

## 2021-11-03 RX ORDER — ALBUTEROL SULFATE 90 UG/1
2 AEROSOL, METERED RESPIRATORY (INHALATION) EVERY 6 HOURS PRN
Qty: 18 G | Refills: 1 | Status: SHIPPED | OUTPATIENT
Start: 2021-11-03 | End: 2022-01-03

## 2021-11-03 NOTE — TELEPHONE ENCOUNTER
Patient is calling for a refill           albuterol sulfate HFA (VENTOLIN HFA) 108 (90 Base) MCG/ACT inhaler           [facility]/pharmacy [place], [place] - [address] - P [phone] - F [phone] [address]  [address] [address]   Phone:  [phone]  Fax:  [phone]        Please advise and call

## 2021-11-12 RX ORDER — MECLIZINE HYDROCHLORIDE 25 MG/1
TABLET ORAL
Qty: 30 TABLET | Refills: 1 | Status: SHIPPED | OUTPATIENT
Start: 2021-11-12 | End: 2022-02-03 | Stop reason: SDUPTHER

## 2021-11-12 RX ORDER — MECLIZINE HYDROCHLORIDE 25 MG/1
25 TABLET ORAL 3 TIMES DAILY PRN
Qty: 30 TABLET | Refills: 1 | Status: SHIPPED | OUTPATIENT
Start: 2021-11-12 | End: 2022-02-03 | Stop reason: SDUPTHER

## 2021-11-17 ENCOUNTER — TELEPHONE (OUTPATIENT)
Dept: ORTHOPEDIC SURGERY | Age: 71
End: 2021-11-17

## 2021-12-16 DIAGNOSIS — F51.01 PRIMARY INSOMNIA: ICD-10-CM

## 2021-12-20 ENCOUNTER — PATIENT MESSAGE (OUTPATIENT)
Dept: INTERNAL MEDICINE CLINIC | Age: 71
End: 2021-12-20

## 2021-12-20 DIAGNOSIS — F51.01 PRIMARY INSOMNIA: ICD-10-CM

## 2021-12-21 NOTE — TELEPHONE ENCOUNTER
From: [place] Hooper  To: Dr. Suleiman Gu  Sent: 12/20/2021 5:00 PM EST  Subject: Prescription Question    Hi [facility] last wed or Thursday on triamzolam . Please e fill this for me thank you

## 2022-01-03 ENCOUNTER — PATIENT MESSAGE (OUTPATIENT)
Dept: INTERNAL MEDICINE CLINIC | Age: 72
End: 2022-01-03

## 2022-01-03 RX ORDER — ALBUTEROL SULFATE 90 UG/1
AEROSOL, METERED RESPIRATORY (INHALATION)
Qty: 18 EACH | Refills: 1 | Status: SHIPPED | OUTPATIENT
Start: 2022-01-03

## 2022-01-03 NOTE — TELEPHONE ENCOUNTER
From: Esme Keita  To: Dr. Perry Mcfarlane: 1/3/2022 10:23 AM EST  Subject: New pharmacy    Wanted to let you know new pharmacy is [facility] [address] [facility] [address] [phone] as if 1-1-22 thank you

## 2022-01-07 RX ORDER — CLINDAMYCIN PHOSPHATE 10 MG/G
GEL TOPICAL
Qty: 30 G | Refills: 3 | Status: SHIPPED | OUTPATIENT
Start: 2022-01-07 | End: 2022-01-17

## 2022-01-09 DIAGNOSIS — K21.9 GASTROESOPHAGEAL REFLUX DISEASE WITHOUT ESOPHAGITIS: ICD-10-CM

## 2022-01-11 RX ORDER — PANTOPRAZOLE SODIUM 40 MG/1
TABLET, DELAYED RELEASE ORAL
Qty: 90 TABLET | Refills: 1 | Status: SHIPPED | OUTPATIENT
Start: 2022-01-11 | End: 2022-03-02 | Stop reason: SDUPTHER

## 2022-01-17 ENCOUNTER — TELEPHONE (OUTPATIENT)
Dept: INTERNAL MEDICINE CLINIC | Age: 72
End: 2022-01-17

## 2022-01-17 RX ORDER — CLINDAMYCIN PHOSPHATE 10 MG/G
GEL TOPICAL
Qty: 30 G | Refills: 3 | Status: SHIPPED
Start: 2022-01-17 | End: 2022-11-01

## 2022-01-17 NOTE — TELEPHONE ENCOUNTER
Angelina needs clarification on     clindamycin (CLINDAGEL) 1 % gel    How often should they take this a day.     Please call and advise

## 2022-01-19 ENCOUNTER — TELEPHONE (OUTPATIENT)
Dept: CARDIOLOGY CLINIC | Age: 72
End: 2022-01-19

## 2022-01-19 ENCOUNTER — NURSE TRIAGE (OUTPATIENT)
Dept: OTHER | Facility: CLINIC | Age: 72
End: 2022-01-19

## 2022-01-19 ENCOUNTER — HOSPITAL ENCOUNTER (OUTPATIENT)
Dept: GENERAL RADIOLOGY | Age: 72
Discharge: HOME | End: 2022-01-19
Payer: MEDICARE

## 2022-01-19 DIAGNOSIS — F51.01 PRIMARY INSOMNIA: ICD-10-CM

## 2022-01-19 DIAGNOSIS — R05.1 ACUTE COUGH: ICD-10-CM

## 2022-01-19 PROCEDURE — 71046 X-RAY EXAM CHEST 2 VIEWS: CPT

## 2022-01-19 NOTE — TELEPHONE ENCOUNTER
Patient called in requesting refill for the following medication:      triazolam (HALCION) 0.25 MG tablet          [facility] [address] [place] - [place] [phone]    Pls advise.

## 2022-01-19 NOTE — TELEPHONE ENCOUNTER
Received call from [facility] with Red Flag Complaint. Subjective: Caller states that he was cleaning up a spill at work and became sob on Monday 1/17. Pt states that since he has felt slightly sob and has an unproductive cough. Current Symptoms: see above    Onset: 3 day ago; unchanged, worsening    Associated Symptoms: NA    Pain Severity: denies    Temperature: denies    What has been tried: otc cold medications    Recommended disposition: office now    Care advice provided, patient verbalizes understanding; denies any other questions or concerns; instructed to call back for any new or worsening symptoms. Writer provided warm transfer to [facility] for appointment scheduling     Attention Provider: Thank you for allowing me to participate in the care of your patient. The patient was connected to triage in response to information provided to the ECC/PSC. Please do not respond through this encounter as the response is not directed to a shared pool.       Reason for Disposition   MILD difficulty breathing (e.g., minimal/no SOB at rest, SOB with walking, pulse <100) and still present when not coughing    Protocols used: COUGH-ADULT-[place]

## 2022-01-19 NOTE — TELEPHONE ENCOUNTER
Patient not feeling well, has been  going on a few days    Feeling short of breath, and experiencing  a cough.     He went to Urgent care and covid test was done and that was negative,    Chest x-ray was done,   Results were normal,    He is concerned about his heart since he had  Bypass surgery ( almost 2 years ago)    Please advise    #[phone]

## 2022-01-20 ENCOUNTER — OFFICE VISIT (OUTPATIENT)
Dept: CARDIOLOGY CLINIC | Age: 72
End: 2022-01-20
Payer: MEDICARE

## 2022-01-20 VITALS
SYSTOLIC BLOOD PRESSURE: 130 MMHG | BODY MASS INDEX: 23.85 KG/M2 | HEART RATE: 84 BPM | WEIGHT: 180.8 LBS | DIASTOLIC BLOOD PRESSURE: 70 MMHG

## 2022-01-20 DIAGNOSIS — I25.10 CORONARY ARTERY DISEASE INVOLVING NATIVE CORONARY ARTERY OF NATIVE HEART WITHOUT ANGINA PECTORIS: Primary | ICD-10-CM

## 2022-01-20 DIAGNOSIS — I25.119 CORONARY ARTERY DISEASE INVOLVING NATIVE CORONARY ARTERY OF NATIVE HEART WITH ANGINA PECTORIS (HCC): ICD-10-CM

## 2022-01-20 PROCEDURE — G8484 FLU IMMUNIZE NO ADMIN: HCPCS | Performed by: NURSE PRACTITIONER

## 2022-01-20 PROCEDURE — G8420 CALC BMI NORM PARAMETERS: HCPCS | Performed by: NURSE PRACTITIONER

## 2022-01-20 PROCEDURE — 3017F COLORECTAL CA SCREEN DOC REV: CPT | Performed by: NURSE PRACTITIONER

## 2022-01-20 PROCEDURE — 1123F ACP DISCUSS/DSCN MKR DOCD: CPT | Performed by: NURSE PRACTITIONER

## 2022-01-20 PROCEDURE — 1036F TOBACCO NON-USER: CPT | Performed by: NURSE PRACTITIONER

## 2022-01-20 PROCEDURE — 99214 OFFICE O/P EST MOD 30 MIN: CPT | Performed by: NURSE PRACTITIONER

## 2022-01-20 PROCEDURE — G8427 DOCREV CUR MEDS BY ELIG CLIN: HCPCS | Performed by: NURSE PRACTITIONER

## 2022-01-20 PROCEDURE — 4040F PNEUMOC VAC/ADMIN/RCVD: CPT | Performed by: NURSE PRACTITIONER

## 2022-01-20 RX ORDER — AZITHROMYCIN 250 MG/1
250 TABLET, FILM COATED ORAL SEE ADMIN INSTRUCTIONS
Qty: 6 TABLET | Refills: 0 | Status: SHIPPED | OUTPATIENT
Start: 2022-01-20 | End: 2022-01-25

## 2022-01-20 NOTE — PROGRESS NOTES
[facility]     Outpatient Follow Up Note  Dr Alexus Ramirez MD,  Fang Tillmanestic APRN,CNP CVNP      [facility] / HPI:  Debra Cohen is 80 y.o. male who presents today with a hx CAD / 2 vessel CABG in 8/2019  Has chronic atypical muscular discomfort with twinge no worse with activity, or breathing  No c/o pain with activity / no c/o edema/PND   Neg stress test 4/9/2021  HLD optimal LDL   Neg chest xray 9/2021  TTE 4/2021 unremarkable   State had COVID vaccines     Interval history: VSS no c/o angina but he states he has some increased SOB, dry coughing with activity for a day, denies any cp / feels like he has URI / no PND or DIAMOND   1/19/2022 neg xray of chest   Had EKG and wnl 1/19/2021   States had rapid neg for Covid test / 1/19/2022 / / had 3 vaccines   State has been less activity and may be deconditioned    Discussed lexiscan if he is not better in a few weeks  He can go to ED if worse / cotn GDMT  / to ED if feels worse     May take tylenol as needed   complaint with meds   Discussed nutrition / sodium intake / fluid intake   Recommend activity as tolerated   Dr Anabell Andujar is leaving  / he will see Dr Pankaj Tubbs in [place] per his choice     Last EKG Echo:Stress Test:if any any past Angiograms: last labs; reviewed with pt. / the results are utilized to determine my plan of care.   20-29 minutes with pt including reviewing tests/meds/plan of care       Past Medical History:   Diagnosis Date    Acid reflux     Asthma     CAD (coronary artery disease)     COPD (chronic obstructive pulmonary disease) (HCC)     mild    Hyperlipidemia     Vertigo      Social History:    Social History     Tobacco Use   Smoking Status Former Smoker    Packs/day: 1.00    Years: 50.00    Pack years: 50.00    Types: Cigarettes    Quit date: 2/22/2018    Years since quitting: 3.9   Smokeless Tobacco Never Used     Current Medications:  Current Outpatient Medications   Medication Sig Dispense Refill    triazolam (HALCION) 0.25 MG tablet Take 1 tablet by mouth nightly for 30 days. 30 tablet 0    clindamycin (CLINDAGEL) 1 % gel FOR TOPICAL APPLICATION AS DIRECTED BY PRESCRIBER TWICE AS NEEDED 30 g 3    pantoprazole (PROTONIX) 40 MG tablet TAKE 1 TABLET BY MOUTH EVERY DAY 90 tablet 1    albuterol sulfate  (90 Base) MCG/ACT inhaler TAKE 2 PUFFS BY MOUTH EVERY 6 HOURS AS NEEDED FOR WHEEZE 18 each 1    meclizine (ANTIVERT) 25 MG tablet TAKE 1 TABLET BY MOUTH 3 TIMES A DAY AS NEEDED FOR DIZZINESS 30 tablet 1    metoprolol succinate (TOPROL XL) 25 MG extended release tablet Take 1 tablet by mouth daily 90 tablet 3    rosuvastatin (CRESTOR) 20 MG tablet Take 20 mg by mouth daily      aspirin 81 MG EC tablet Take 81 mg by mouth daily      Ascorbic Acid (VITAMIN C) 500 MG CAPS Take 1 capsule by mouth daily      Multiple Vitamins-Minerals (MULTIVITAMIN ADULTS 50+) TABS Take by mouth daily      Simethicone (GAS-X PO) Take by mouth as needed      acetaminophen (TYLENOL) 325 MG tablet Take 650 mg by mouth every 6 hours as needed for Pain      vitamin B-12 (CYANOCOBALAMIN) 1000 MCG tablet Take 1,000 mcg by mouth daily      sucralfate (CARAFATE) 1 GM tablet Take 1 g by mouth as needed      triazolam (HALCION) 0.25 MG tablet TAKE 1 TABLET BY MOUTH EVERY DAY IN THE EVENING 30 tablet 0    meclizine (ANTIVERT) 25 MG tablet Take 1 tablet by mouth 3 times daily as needed for Dizziness 30 tablet 1    clonazePAM (KLONOPIN) 0.5 MG tablet Take 1 tablet by mouth daily as needed for Anxiety for up to 30 days. 30 tablet 0    Probiotic Product (PROBIOTIC-10 PO) Take by mouth daily (Patient not taking: Reported on 10/13/2021)      Lido-Menthol-Methyl Sal-Camph (CBD KINGS EX) Apply topically daily       No current facility-administered medications for this visit. REVIEW OF SYSTEMS:    CONSTITUTIONAL: No major weight gain or loss, night sweats, fever, fatigue, or weakness.   HEENT: No new vision difficulties or ringing in the 44.7 09/04/2021    MCV 94.6 09/04/2021     09/04/2021       Lab Results   Component Value Date    TRIG 111 09/04/2021    TRIG 139 02/23/2021     Lab Results   Component Value Date    HDL 52 09/04/2021    HDL 51 02/23/2021     Lab Results   Component Value Date    LDLCALC 25 09/04/2021    LDLCALC 29 02/23/2021     Lab Results   Component Value Date    LABVLDL 22 09/04/2021    LABVLDL 28 02/23/2021     Radiology Review:  Pertinent images / reports were reviewed as a part of this visit and reveals the following:    Assessment:     CAD / 2 vessel CABG in 8/2019  No c/o pain with activity / no c/o edema/PND   Neg stress test 4/9/2021  TTE 4/2021 unremarkable    HLD optimal LDL       Plan   VSS no c/o angina but he states he has some increased SOB, dry coughing with activity for a day, denies any cp / feels like he has URI / no PND or DIAMOND   1/19/2022 neg xray of chest   Had EKG and wnl 1/19/2021   States had rapid neg for Covid test / 1/19/2022 / / had 3 vaccines   State has been less activity and may be deconditioned    Discussed lexiscan if he is not better in a few weeks  He can go to ED if worse / [facility]     Dr Katherine Marks is leaving  / he will see Dr Frankie Marrero in [place] per his choice   Fu with Dr Frankie Marrero in 6 months with blood work     Thank you for allowing us to participate in the care of Cielo Paul.     Ta Prima APRN FNP [address]     Documentation of today's visit sent to PCP

## 2022-01-20 NOTE — PATIENT INSTRUCTIONS
Dr Tanvir No is leaving  / he will see Dr Charles Perez in [place] per his choice   Fu with Dr Charles Perez in 6 months with blood work

## 2022-01-21 NOTE — TELEPHONE ENCOUNTER
Pt states he is slightly feeling better. Pt went to Urgent care and was given a steroid, then pt went to cardilogist and was seen due to the shortness of breath and was given antibiotic. Has also taken Mucinex this morning to help. Suggested to call the office if symptoms do not get better. Patient verbalized understanding.

## 2022-01-24 ENCOUNTER — TELEPHONE (OUTPATIENT)
Dept: INTERNAL MEDICINE CLINIC | Age: 72
End: 2022-01-24

## 2022-01-25 RX ORDER — BENZONATATE 200 MG/1
200 CAPSULE ORAL 3 TIMES DAILY PRN
Qty: 30 CAPSULE | Refills: 0 | Status: SHIPPED | OUTPATIENT
Start: 2022-01-25 | End: 2022-02-01

## 2022-02-03 ENCOUNTER — PATIENT MESSAGE (OUTPATIENT)
Dept: INTERNAL MEDICINE CLINIC | Age: 72
End: 2022-02-03

## 2022-02-03 RX ORDER — MECLIZINE HYDROCHLORIDE 25 MG/1
25 TABLET ORAL 3 TIMES DAILY PRN
Qty: 30 TABLET | Refills: 1 | OUTPATIENT
Start: 2022-02-03 | End: 2022-03-28

## 2022-02-03 NOTE — TELEPHONE ENCOUNTER
From: Cielo Paul  To: Dr. Karol Paget  Sent: 2/3/2022 1:34 PM EST  Subject: Prescription     Hi I need a refill for my MECLIZINE 25 mg tablet a new one for [address] [place] [address] thank you

## 2022-02-22 DIAGNOSIS — I25.10 CORONARY ARTERY DISEASE INVOLVING NATIVE CORONARY ARTERY OF NATIVE HEART WITHOUT ANGINA PECTORIS: ICD-10-CM

## 2022-02-22 LAB
A/G RATIO: 2 (ref 1.1–2.2)
ALBUMIN SERPL-MCNC: 4.4 G/DL (ref 3.4–5)
ALP BLD-CCNC: 68 U/L (ref 40–129)
ALT SERPL-CCNC: 21 U/L (ref 10–40)
ANION GAP SERPL CALCULATED.3IONS-SCNC: 13 MMOL/L (ref 3–16)
AST SERPL-CCNC: 19 U/L (ref 15–37)
BILIRUB SERPL-MCNC: 0.3 MG/DL (ref 0–1)
BILIRUBIN DIRECT: <0.2 MG/DL (ref 0–0.3)
BILIRUBIN, INDIRECT: NORMAL MG/DL (ref 0–1)
BUN BLDV-MCNC: 31 MG/DL (ref 7–20)
CALCIUM SERPL-MCNC: 9.4 MG/DL (ref 8.3–10.6)
CHLORIDE BLD-SCNC: 103 MMOL/L (ref 99–110)
CHOLESTEROL, TOTAL: 110 MG/DL (ref 0–199)
CO2: 27 MMOL/L (ref 21–32)
CREAT SERPL-MCNC: 0.8 MG/DL (ref 0.8–1.3)
FOLATE: 18.97 NG/ML (ref 4.78–24.2)
GFR AFRICAN AMERICAN: >60
GFR NON-AFRICAN AMERICAN: >60
GLUCOSE BLD-MCNC: 107 MG/DL (ref 70–99)
HCT VFR BLD CALC: 41.7 % (ref 40.5–52.5)
HDLC SERPL-MCNC: 44 MG/DL (ref 40–60)
HEMOGLOBIN: 14.1 G/DL (ref 13.5–17.5)
LDL CHOLESTEROL CALCULATED: 29 MG/DL
MAGNESIUM: 2.3 MG/DL (ref 1.8–2.4)
MCH RBC QN AUTO: 32.1 PG (ref 26–34)
MCHC RBC AUTO-ENTMCNC: 33.7 G/DL (ref 31–36)
MCV RBC AUTO: 95.1 FL (ref 80–100)
PDW BLD-RTO: 12.8 % (ref 12.4–15.4)
PLATELET # BLD: 198 K/UL (ref 135–450)
PMV BLD AUTO: 9.4 FL (ref 5–10.5)
POTASSIUM SERPL-SCNC: 4.2 MMOL/L (ref 3.5–5.1)
RBC # BLD: 4.39 M/UL (ref 4.2–5.9)
SODIUM BLD-SCNC: 143 MMOL/L (ref 136–145)
TOTAL PROTEIN: 6.6 G/DL (ref 6.4–8.2)
TRIGL SERPL-MCNC: 186 MG/DL (ref 0–150)
TSH REFLEX FT4: 2.29 UIU/ML (ref 0.27–4.2)
VITAMIN B-12: 943 PG/ML (ref 211–911)
VITAMIN D 25-HYDROXY: 28.9 NG/ML
VLDLC SERPL CALC-MCNC: 37 MG/DL
WBC # BLD: 4.4 K/UL (ref 4–11)

## 2022-02-23 LAB
ESTIMATED AVERAGE GLUCOSE: 111.2 MG/DL
HBA1C MFR BLD: 5.5 %

## 2022-02-23 SDOH — HEALTH STABILITY: PHYSICAL HEALTH: ON AVERAGE, HOW MANY DAYS PER WEEK DO YOU ENGAGE IN MODERATE TO STRENUOUS EXERCISE (LIKE A BRISK WALK)?: 0 DAYS

## 2022-02-23 ASSESSMENT — PATIENT HEALTH QUESTIONNAIRE - PHQ9
SUM OF ALL RESPONSES TO PHQ QUESTIONS 1-9: 1
2. FEELING DOWN, DEPRESSED OR HOPELESS: 0
SUM OF ALL RESPONSES TO PHQ QUESTIONS 1-9: 1
SUM OF ALL RESPONSES TO PHQ QUESTIONS 1-9: 1
1. LITTLE INTEREST OR PLEASURE IN DOING THINGS: 1
SUM OF ALL RESPONSES TO PHQ9 QUESTIONS 1 & 2: 1
SUM OF ALL RESPONSES TO PHQ QUESTIONS 1-9: 1

## 2022-02-23 ASSESSMENT — LIFESTYLE VARIABLES
HOW OFTEN DO YOU HAVE A DRINK CONTAINING ALCOHOL: PATIENT DECLINED
HOW OFTEN DO YOU HAVE SIX OR MORE DRINKS ON ONE OCCASION: 1
HOW OFTEN DO YOU HAVE A DRINK CONTAINING ALCOHOL: 98
HOW MANY STANDARD DRINKS CONTAINING ALCOHOL DO YOU HAVE ON A TYPICAL DAY: 98
HOW MANY STANDARD DRINKS CONTAINING ALCOHOL DO YOU HAVE ON A TYPICAL DAY: PATIENT DECLINED

## 2022-02-25 DIAGNOSIS — F51.01 PRIMARY INSOMNIA: ICD-10-CM

## 2022-02-28 ENCOUNTER — TELEPHONE (OUTPATIENT)
Dept: CARDIOLOGY CLINIC | Age: 72
End: 2022-02-28

## 2022-02-28 RX ORDER — METOPROLOL SUCCINATE 25 MG/1
25 TABLET, EXTENDED RELEASE ORAL DAILY
Qty: 90 TABLET | Refills: 3 | Status: SHIPPED | OUTPATIENT
Start: 2022-02-28

## 2022-03-01 ENCOUNTER — TELEPHONE (OUTPATIENT)
Dept: CARDIOLOGY CLINIC | Age: 72
End: 2022-03-01

## 2022-03-01 NOTE — TELEPHONE ENCOUNTER
1. Pt has appt to see Dr. Joy Wilkerson in July but wants to know his lab results from 2/22/22.     2. He also needs refill:    rosuvastatin (CRESTOR) 20 MG tablet  Take 20 mg by mouth daily Last Dose: Not Recorded    Last visit: 1/20/22  Next visit: 7/6/22  Last labs: 2/22/22

## 2022-03-02 ENCOUNTER — OFFICE VISIT (OUTPATIENT)
Dept: INTERNAL MEDICINE CLINIC | Age: 72
End: 2022-03-02
Payer: MEDICARE

## 2022-03-02 VITALS
BODY MASS INDEX: 24.73 KG/M2 | HEART RATE: 54 BPM | TEMPERATURE: 97.4 F | SYSTOLIC BLOOD PRESSURE: 110 MMHG | OXYGEN SATURATION: 98 % | HEIGHT: 73 IN | RESPIRATION RATE: 14 BRPM | WEIGHT: 186.6 LBS | DIASTOLIC BLOOD PRESSURE: 70 MMHG

## 2022-03-02 DIAGNOSIS — Z00.00 WELL ADULT EXAM: Primary | ICD-10-CM

## 2022-03-02 DIAGNOSIS — N40.1 BENIGN PROSTATIC HYPERPLASIA WITH URINARY FREQUENCY: ICD-10-CM

## 2022-03-02 DIAGNOSIS — R35.0 BENIGN PROSTATIC HYPERPLASIA WITH URINARY FREQUENCY: ICD-10-CM

## 2022-03-02 DIAGNOSIS — M79.672 LEFT FOOT PAIN: ICD-10-CM

## 2022-03-02 DIAGNOSIS — E78.5 HYPERLIPIDEMIA LDL GOAL <70: ICD-10-CM

## 2022-03-02 DIAGNOSIS — J30.1 ALLERGIC RHINITIS DUE TO POLLEN, UNSPECIFIED SEASONALITY: ICD-10-CM

## 2022-03-02 DIAGNOSIS — R42 VERTIGO: ICD-10-CM

## 2022-03-02 DIAGNOSIS — F51.01 PRIMARY INSOMNIA: ICD-10-CM

## 2022-03-02 DIAGNOSIS — I25.10 CORONARY ARTERY DISEASE INVOLVING NATIVE CORONARY ARTERY OF NATIVE HEART WITHOUT ANGINA PECTORIS: ICD-10-CM

## 2022-03-02 DIAGNOSIS — R35.1 NOCTURIA: ICD-10-CM

## 2022-03-02 DIAGNOSIS — K21.9 GASTROESOPHAGEAL REFLUX DISEASE WITHOUT ESOPHAGITIS: ICD-10-CM

## 2022-03-02 DIAGNOSIS — J43.2 CENTRILOBULAR EMPHYSEMA (HCC): ICD-10-CM

## 2022-03-02 DIAGNOSIS — Z00.00 MEDICARE ANNUAL WELLNESS VISIT, SUBSEQUENT: ICD-10-CM

## 2022-03-02 PROCEDURE — 3023F SPIROM DOC REV: CPT | Performed by: INTERNAL MEDICINE

## 2022-03-02 PROCEDURE — G0439 PPPS, SUBSEQ VISIT: HCPCS | Performed by: INTERNAL MEDICINE

## 2022-03-02 PROCEDURE — 3017F COLORECTAL CA SCREEN DOC REV: CPT | Performed by: INTERNAL MEDICINE

## 2022-03-02 PROCEDURE — G8420 CALC BMI NORM PARAMETERS: HCPCS | Performed by: INTERNAL MEDICINE

## 2022-03-02 PROCEDURE — 99214 OFFICE O/P EST MOD 30 MIN: CPT | Performed by: INTERNAL MEDICINE

## 2022-03-02 PROCEDURE — 1123F ACP DISCUSS/DSCN MKR DOCD: CPT | Performed by: INTERNAL MEDICINE

## 2022-03-02 PROCEDURE — 1036F TOBACCO NON-USER: CPT | Performed by: INTERNAL MEDICINE

## 2022-03-02 PROCEDURE — G8484 FLU IMMUNIZE NO ADMIN: HCPCS | Performed by: INTERNAL MEDICINE

## 2022-03-02 PROCEDURE — G8427 DOCREV CUR MEDS BY ELIG CLIN: HCPCS | Performed by: INTERNAL MEDICINE

## 2022-03-02 PROCEDURE — 4040F PNEUMOC VAC/ADMIN/RCVD: CPT | Performed by: INTERNAL MEDICINE

## 2022-03-02 RX ORDER — PANTOPRAZOLE SODIUM 40 MG/1
TABLET, DELAYED RELEASE ORAL
Qty: 90 TABLET | Refills: 1 | Status: SHIPPED | OUTPATIENT
Start: 2022-03-02 | End: 2022-09-14

## 2022-03-02 RX ORDER — ROSUVASTATIN CALCIUM 20 MG/1
20 TABLET, COATED ORAL DAILY
Qty: 90 TABLET | Refills: 1 | Status: SHIPPED | OUTPATIENT
Start: 2022-03-02 | End: 2022-09-12

## 2022-03-02 ASSESSMENT — ENCOUNTER SYMPTOMS
ALLERGIC/IMMUNOLOGIC NEGATIVE: 1
CHEST TIGHTNESS: 1

## 2022-03-02 NOTE — ASSESSMENT & PLAN NOTE
At goal, continue current medications and lifestyle modifications recommended Repeat labs in 6 months

## 2022-03-02 NOTE — ASSESSMENT & PLAN NOTE
Borderline controlled, continue current medications and lifestyle modifications recommended cont meds try to decrease also cont good sleep mechanics

## 2022-03-02 NOTE — ASSESSMENT & PLAN NOTE
Well-controlled, continue current medications and lifestyle modifications recommended prn meds as noted

## 2022-03-02 NOTE — ASSESSMENT & PLAN NOTE
Monitored by specialist- no acute findings meriting change in the plan seeing Dr Valarie Rodriguez cont meds and F/u

## 2022-03-02 NOTE — PROGRESS NOTES
Subjective:      Patient ID: Olesya Baker is a 71 y.o. male. HPI  Here today for medicare  complete physical and review of chronic problems as listed under assessment and plan,no new c/o feels good had COVID 1/25/22 no residue      Hyperlipidemia  This is a chronic problem. The current episode started more than 1 year ago. The problem is controlled. Recent lipid tests were reviewed and are normal. Pertinent negatives include no chest pain. Current antihyperlipidemic treatment includes diet change, statins and exercise. The current treatment provides mild improvement of lipids. There are no compliance problems. Risk factors for coronary artery disease include male sex (known CAD ). Coronary Artery Disease  Presents for follow-up visit. Symptoms include chest tightness and dizziness. Pertinent negatives include no chest pain, chest pressure, leg swelling or palpitations. Risk factors include hyperlipidemia. The symptoms have been stable. Compliance with diet is good. Compliance with exercise is variable. Compliance with medications is good. Allergies   Allergen Reactions    Adhesive Tape Rash       Current Outpatient Medications   Medication Sig Dispense Refill    pantoprazole (PROTONIX) 40 MG tablet TAKE 1 TABLET BY MOUTH EVERY DAY 90 tablet 1    triazolam (HALCION) 0.25 MG tablet TAKE ONE TABLET BY MOUTH ONCE NIGHTLY 30 tablet 0    metoprolol succinate (TOPROL XL) 25 MG extended release tablet Take 1 tablet by mouth daily 90 tablet 3    meclizine (ANTIVERT) 25 MG tablet Take 1 tablet by mouth 3 times daily as needed for Dizziness 30 tablet 1    clindamycin (CLINDAGEL) 1 % gel FOR TOPICAL APPLICATION AS DIRECTED BY PRESCRIBER TWICE AS NEEDED 30 g 3    albuterol sulfate  (90 Base) MCG/ACT inhaler TAKE 2 PUFFS BY MOUTH EVERY 6 HOURS AS NEEDED FOR WHEEZE 18 each 1    clonazePAM (KLONOPIN) 0.5 MG tablet Take 1 tablet by mouth daily as needed for Anxiety for up to 30 days.  30 tablet 0    rosuvastatin (CRESTOR) 20 MG tablet Take 20 mg by mouth daily      aspirin 81 MG EC tablet Take 81 mg by mouth daily      Ascorbic Acid (VITAMIN C) 500 MG CAPS Take 1 capsule by mouth daily      Multiple Vitamins-Minerals (MULTIVITAMIN ADULTS 50+) TABS Take by mouth daily      Simethicone (GAS-X PO) Take by mouth as needed      acetaminophen (TYLENOL) 325 MG tablet Take 650 mg by mouth every 6 hours as needed for Pain      sucralfate (CARAFATE) 1 GM tablet Take 1 g by mouth as needed       No current facility-administered medications for this visit.        Past Medical History:   Diagnosis Date    Acid reflux     Asthma     CAD (coronary artery disease)     COPD (chronic obstructive pulmonary disease) ([facility])     mild    Hyperlipidemia     Vertigo        Family History   Problem Relation Age of Onset    Diabetes Mother     Heart Disease Father     Coronary Art Dis Brother        Past Surgical History:   Procedure Laterality Date    COLONOSCOPY      2017, diverticulosis, repeat 5/15/2026 Dr. Brenton Villalba      2 Vessels     ESOPHAGEAL DILATATION      INGUINAL HERNIA REPAIR Right     PROSTATE SURGERY  2017    TURP       Social History     Socioeconomic History    Marital status: Single     Spouse name: Not on file    Number of children: Not on file    Years of education: Not on file    Highest education level: Not on file   Occupational History    Not on file   Tobacco Use    Smoking status: Former Smoker     Packs/day: 1.00     Years: 50.00     Pack years: 50.00     Types: Cigarettes     Quit date: 2018     Years since quittin.0    Smokeless tobacco: Never Used   Vaping Use    Vaping Use: Never used   Substance and Sexual Activity    Alcohol use: Not Currently     Comment: Rarely    Drug use: Not Currently    Sexual activity: Not on file   Other Topics Concern    Not on file   Social History Narrative    Not on file     Social Determinants of Health     Financial Resource Strain: Low Risk     Difficulty of Paying Living Expenses: Not hard at all   Food Insecurity: No Food Insecurity    Worried About Running Out of Food in the Last Year: Never true    [place] of Food in the Last Year: Never true   Transportation Needs:     Lack of Transportation (Medical): Not on file    Lack of Transportation (Non-Medical): Not on file   Physical Activity: Unknown    Days of Exercise per Week: 0 days    Minutes of Exercise per Session: Not on file   Stress:     Feeling of Stress : Not on file   Social Connections:     Frequency of Communication with Friends and Family: Not on file    Frequency of Social Gatherings with Friends and Family: Not on file    Attends Scientologist Services: Not on file    Active Member of [address] [place] [address] [employer] or Organizations: Not on file    Attends Club or Organization Meetings: Not on file    Marital Status: Not on file   Intimate Partner Violence:     Fear of Current or Ex-Partner: Not on file    Emotionally Abused: Not on file    Physically Abused: Not on file    Sexually Abused: Not on file   Housing Stability:     Unable to Pay for Housing in the Last Year: Not on file    Number of [place] in the Last Year: Not on file    Unstable Housing in the Last Year: Not on file       Review of Systems  Review of Systems   Constitutional: Negative for unexpected weight change. HENT: Positive for congestion and postnasal drip. Middle ear dizziness Tx with prn Klonapin and anitvert    Eyes:        Glasses  Has floaters    Respiratory: Positive for chest tightness. Cardiovascular: Negative for chest pain, palpitations and leg swelling. Gastrointestinal:        GERD cont diet and meds    Genitourinary: Positive for decreased urine volume. Nocturia s/p TURP    Musculoskeletal: Positive for arthralgias (in hands ). Allergic/Immunologic: Negative. Neurological: Positive for dizziness.    Psychiatric/Behavioral: Positive Gastroesophageal reflux disease without esophagitis    Well controled with diet and prn meds     Primary insomnia   Borderline controlled, continue current medications and lifestyle modifications recommended cont meds try to decrease also cont good sleep mechanics     Benign prostatic hyperplasia with lower urinary tract symptoms   Borderline controlled, lifestyle modifications recommended    Vertigo    Cont prn meds  For occ symptoms     Pulmonary emphysema (HCC)   Well-controlled, continue current medications and lifestyle modifications recommended prn meds as noted     Allergic rhinitis    Tx with Flonase,Claritin and Mucinex DM     Well adult exam   Within normal limits for age- retiredno ADL issues,immunizations up to date, no depression ,no cognitive impairment  Colonoscopy up to date 2017 divertic repeat 2027  Eye exam up to date  Exercises as tolerated    No living will but does not want resuscitation info given    Findings and recommendations discussed with Pt     Left foot pain    L heal pain x 2 mo no injury seeing Dr Ale Benson in  boot now      [place] Needs  1950    Allergies   Allergen Reactions    Adhesive Tape Rash     Current Outpatient Medications   Medication Sig Dispense Refill    pantoprazole (PROTONIX) 40 MG tablet TAKE 1 TABLET BY MOUTH EVERY DAY 90 tablet 1    triazolam (HALCION) 0.25 MG tablet TAKE ONE TABLET BY MOUTH ONCE NIGHTLY 30 tablet 0    metoprolol succinate (TOPROL XL) 25 MG extended release tablet Take 1 tablet by mouth daily 90 tablet 3    meclizine (ANTIVERT) 25 MG tablet Take 1 tablet by mouth 3 times daily as needed for Dizziness 30 tablet 1    clindamycin (CLINDAGEL) 1 % gel FOR TOPICAL APPLICATION AS DIRECTED BY PRESCRIBER TWICE AS NEEDED 30 g 3    albuterol sulfate  (90 Base) MCG/ACT inhaler TAKE 2 PUFFS BY MOUTH EVERY 6 HOURS AS NEEDED FOR WHEEZE 18 each 1    clonazePAM (KLONOPIN) 0.5 MG tablet Take 1 tablet by mouth daily as needed for Anxiety for up to 30 days. 30 tablet 0    rosuvastatin (CRESTOR) 20 MG tablet Take 20 mg by mouth daily      aspirin 81 MG EC tablet Take 81 mg by mouth daily      Ascorbic Acid (VITAMIN C) 500 MG CAPS Take 1 capsule by mouth daily      Multiple Vitamins-Minerals (MULTIVITAMIN ADULTS 50+) TABS Take by mouth daily      Simethicone (GAS-X PO) Take by mouth as needed      acetaminophen (TYLENOL) 325 MG tablet Take 650 mg by mouth every 6 hours as needed for Pain      sucralfate (CARAFATE) 1 GM tablet Take 1 g by mouth as needed       No current facility-administered medications for this visit. Vitals:    03/02/22 0833   BP: 110/70   Site: Right Upper Arm   Position: Sitting   Cuff Size: Medium Adult   Pulse: 54   Resp: 14   Temp: 97.4 °F (36.3 °C)   TempSrc: Temporal   SpO2: 98%   Weight: 186 lb 9.6 oz (84.6 kg)   Height: 6' 1\" (1.854 m)     Body mass index is 24.62 kg/m². Wt Readings from Last 3 Encounters:   03/02/22 186 lb 9.6 oz (84.6 kg)   01/20/22 180 lb 12.8 oz (82 kg)   10/13/21 183 lb (83 kg)     BP Readings from Last 3 Encounters:   03/02/22 110/70   01/20/22 130/70   10/13/21 (!) 142/72       Consultants:   Patient Care Team:  Khalida Adan MD as PCP - General (Internal Medicine)  Khalida Adan MD as PCP - [facility]  Baltazar Tellez DPM as Consulting Physician (Podiatry)    Chief Complaint:   Bishnu Howell is a 71 y.o. male who presents for Medicare Preventive Physical Examination with Personalized Prevention Plan Services.     HPI: Tr review listed chronic problems     Patient Active Problem List   Diagnosis    Coronary artery disease involving native coronary artery    Hyperlipidemia LDL goal <70    Gastroesophageal reflux disease without esophagitis    Auditory vertigo involving both ears    Primary insomnia    Well adult exam    Benign prostatic hyperplasia with lower urinary tract symptoms    Vertigo    Pulmonary emphysema (HCC)    Allergic rhinitis    Coronary artery disease involving native coronary artery of native heart with angina pectoris ([facility] [address]    Left foot pain       Mood Disorders Screen:  Risk factors: previous episode of depression  Symptoms:  endorses depressed mood and difficulty sleeping, denies difficulty concentrating and fatigue     Safety Assessment:  Functional ability/ADLs:  Difficulty with bathing- no, grooming- no, meals- no, incontinence- no.  Driving- yes. Home safety: Lives alone. Number of stairs to enter home: 4, within home: 0. Risk factors for falls: none. Home environment modifications:  no.     End of Life Planning:  Advanced Directive: has NO advanced directive  - add't info requested.  Referral to SW: no.      Preventive Care:  Self-testicular exams: Yes  Last PSA: 2.82, normal  Last colonoscopy: 2017, normal  AAA screening:  no   Last eye exam: 2021, glasses early cataracts and floaters  Exercise: yes  Seatbelt use: yes      Immunization History   Administered Date(s) Administered    COVID-19, Moderna, Booster, PF, 50mcg/0.25ml 09/02/2021    COVID-19, Moderna, Primary or Immunocompromised, PF, 100mcg/0.5mL 02/04/2021, 03/04/2021    Influenza Virus Vaccine 10/07/2018, 10/01/2019, 09/22/2021    Influenza, High Dose (Fluzone 65 yrs and older) 10/08/2017, 10/22/2020    Influenza, High-dose, Quadv, 65 yrs +, IM (Fluzone) 09/06/2020    Pneumococcal Conjugate 13-valent ([id number]) 02/22/2019, 07/01/2019    Pneumococcal Polysaccharide ([id number]) 02/22/2020, 07/28/2020    Tdap (Boostrix, Adacel) 02/22/2020       Past Medical History:   Diagnosis Date    Acid reflux     Asthma     CAD (coronary artery disease)     COPD (chronic obstructive pulmonary disease) (HCC)     mild    Hyperlipidemia     Vertigo      Past Surgical History:   Procedure Laterality Date    COLONOSCOPY      05/05/2017, diverticulosis, repeat 5/15/2026 Dr. Anny Al  2019    2 Vessels     ESOPHAGEAL DILATATION      INGUINAL HERNIA on file    Physically Abused: Not on file    Sexually Abused: Not on file   Housing Stability:     Unable to Pay for Housing in the Last Year: Not on file    Number of Places Lived in the Last Year: Not on file    Unstable Housing in the Last Year: Not on file     }        Visual Acuity: Corrected:            L  20/30            R 20/40    Cognitive Screening:  Clock drawing test score: 5/5. Mini-mental status exam score: NA. Assessment/Plan:  Rory Santamaria was seen today for medicare aw and discuss labs. Diagnoses and all orders for this visit:    Well adult exam    Coronary artery disease involving native coronary artery of native heart without angina pectoris  -     CBC with Auto Differential; Future  -     Comprehensive Metabolic Panel; Future  -     Lipid Panel; Future  -     TSH with Reflex; Future  -     PSA, Prostatic Specific Antigen; Future  -     Urinalysis with Reflex to Culture; Future    Hyperlipidemia LDL goal <70  -     CBC with Auto Differential; Future  -     Comprehensive Metabolic Panel; Future  -     Lipid Panel; Future  -     TSH with Reflex; Future  -     PSA, Prostatic Specific Antigen; Future  -     Urinalysis with Reflex to Culture; Future    Gastroesophageal reflux disease without esophagitis  -     pantoprazole (PROTONIX) 40 MG tablet; TAKE 1 TABLET BY MOUTH EVERY DAY  -     CBC with Auto Differential; Future  -     Comprehensive Metabolic Panel; Future  -     Lipid Panel; Future  -     TSH with Reflex; Future  -     PSA, Prostatic Specific Antigen; Future  -     Urinalysis with Reflex to Culture; Future    Primary insomnia  -     CBC with Auto Differential; Future  -     Comprehensive Metabolic Panel; Future  -     Lipid Panel; Future  -     TSH with Reflex; Future  -     PSA, Prostatic Specific Antigen; Future  -     Urinalysis with Reflex to Culture;  Future    Benign prostatic hyperplasia with urinary frequency  -     CBC with Auto Differential; Future  -     Comprehensive Metabolic Panel; Future  -     Lipid Panel; Future  -     TSH with Reflex; Future  -     PSA, Prostatic Specific Antigen; Future  -     Urinalysis with Reflex to Culture; Future    Vertigo  -     CBC with Auto Differential; Future  -     Comprehensive Metabolic Panel; Future  -     Lipid Panel; Future  -     TSH with Reflex; Future  -     PSA, Prostatic Specific Antigen; Future  -     Urinalysis with Reflex to Culture; Future    Centrilobular emphysema (HCC)  -     CBC with Auto Differential; Future  -     Comprehensive Metabolic Panel; Future  -     Lipid Panel; Future  -     TSH with Reflex; Future  -     PSA, Prostatic Specific Antigen; Future  -     Urinalysis with Reflex to Culture; Future    Allergic rhinitis due to pollen, unspecified seasonality  -     CBC with Auto Differential; Future  -     Comprehensive Metabolic Panel; Future  -     Lipid Panel; Future  -     TSH with Reflex; Future  -     PSA, Prostatic Specific Antigen; Future  -     Urinalysis with Reflex to Culture; Future    Left foot pain  -     CBC with Auto Differential; Future  -     Comprehensive Metabolic Panel; Future  -     Lipid Panel; Future  -     TSH with Reflex; Future  -     PSA, Prostatic Specific Antigen; Future  -     Urinalysis with Reflex to Culture; Future    Nocturia  -     CBC with Auto Differential; Future  -     Comprehensive Metabolic Panel; Future  -     Lipid Panel; Future  -     TSH with Reflex; Future  -     PSA, Prostatic Specific Antigen; Future  -     Urinalysis with Reflex to Culture; Future      Medicare Annual Wellness Visit    Vini Solis is here for Medicare AWV and [address] was seen today for medicare awv and discuss labs. Diagnoses and all orders for this visit:    Well adult exam    Coronary artery disease involving native coronary artery of native heart without angina pectoris  -     CBC with Auto Differential; Future  -     Comprehensive Metabolic Panel;  Future  - Lipid Panel; Future  -     TSH with Reflex; Future  -     PSA, Prostatic Specific Antigen; Future  -     Urinalysis with Reflex to Culture; Future    Hyperlipidemia LDL goal <70  -     CBC with Auto Differential; Future  -     Comprehensive Metabolic Panel; Future  -     Lipid Panel; Future  -     TSH with Reflex; Future  -     PSA, Prostatic Specific Antigen; Future  -     Urinalysis with Reflex to Culture; Future    Gastroesophageal reflux disease without esophagitis  -     pantoprazole (PROTONIX) 40 MG tablet; TAKE 1 TABLET BY MOUTH EVERY DAY  -     CBC with Auto Differential; Future  -     Comprehensive Metabolic Panel; Future  -     Lipid Panel; Future  -     TSH with Reflex; Future  -     PSA, Prostatic Specific Antigen; Future  -     Urinalysis with Reflex to Culture; Future    Primary insomnia  -     CBC with Auto Differential; Future  -     Comprehensive Metabolic Panel; Future  -     Lipid Panel; Future  -     TSH with Reflex; Future  -     PSA, Prostatic Specific Antigen; Future  -     Urinalysis with Reflex to Culture; Future    Benign prostatic hyperplasia with urinary frequency  -     CBC with Auto Differential; Future  -     Comprehensive Metabolic Panel; Future  -     Lipid Panel; Future  -     TSH with Reflex; Future  -     PSA, Prostatic Specific Antigen; Future  -     Urinalysis with Reflex to Culture; Future    Vertigo  -     CBC with Auto Differential; Future  -     Comprehensive Metabolic Panel; Future  -     Lipid Panel; Future  -     TSH with Reflex; Future  -     PSA, Prostatic Specific Antigen; Future  -     Urinalysis with Reflex to Culture; Future    Centrilobular emphysema (HCC)  -     CBC with Auto Differential; Future  -     Comprehensive Metabolic Panel; Future  -     Lipid Panel; Future  -     TSH with Reflex; Future  -     PSA, Prostatic Specific Antigen; Future  -     Urinalysis with Reflex to Culture;  Future    Allergic rhinitis due to pollen, unspecified seasonality  -     CBC with Auto Differential; Future  -     Comprehensive Metabolic Panel; Future  -     Lipid Panel; Future  -     TSH with Reflex; Future  -     PSA, Prostatic Specific Antigen; Future  -     Urinalysis with Reflex to Culture; Future    Left foot pain  -     CBC with Auto Differential; Future  -     Comprehensive Metabolic Panel; Future  -     Lipid Panel; Future  -     TSH with Reflex; Future  -     PSA, Prostatic Specific Antigen; Future  -     Urinalysis with Reflex to Culture; Future    Nocturia  -     CBC with Auto Differential; Future  -     Comprehensive Metabolic Panel; Future  -     Lipid Panel; Future  -     TSH with Reflex; Future  -     PSA, Prostatic Specific Antigen; Future  -     Urinalysis with Reflex to Culture; Future         Recommendations for Preventive Services Due: see orders and patient instructions/AVS.  Recommended screening schedule for the next 5-10 years is provided to the patient in written form: see Patient Instructions/AVS.     No follow-ups on file.      Subjective   The following acute and/or chronic problems were also addressed today:  Coronary artery disease involving native coronary artery   Monitored by specialist- no acute findings meriting change in the plan seeing Dr Pankaj Tubbs cont meds and F/u     Hyperlipidemia LDL goal <70   At goal, continue current medications and lifestyle modifications recommended Repeat labs in 6 months      Gastroesophageal reflux disease without esophagitis    Well controled with diet and prn meds     Primary insomnia   Borderline controlled, continue current medications and lifestyle modifications recommended cont meds try to decrease also cont good sleep mechanics     Benign prostatic hyperplasia with lower urinary tract symptoms   Borderline controlled, lifestyle modifications recommended    Vertigo    Cont prn meds  For occ symptoms     Pulmonary emphysema (HCC)   Well-controlled, continue current medications and lifestyle modifications recommended prn meds as noted     Allergic rhinitis    Tx with Flonase,Claritin and Mucinex DM     Well adult exam   Within normal limits for age- retiredno ADL issues,immunizations up to date, no depression ,no cognitive impairment  Colonoscopy up to date 2017 divertic repeat 2027  Eye exam up to date  Exercises as tolerated    No living will but does not want resuscitation info given    Findings and recommendations discussed with Pt     Left foot pain    L heal pain x 2 mo no injury seeing Dr Mariaa Desai in  boot now        Patient's complete Health Risk Assessment and screening values have been reviewed and are found in [address]. The following problems were reviewed today and where indicated follow up appointments were made and/or referrals ordered.     Positive Risk Factor Screenings with Interventions:             General Health and ACP:  General  In general, how would you say your health is?: Good  In the past 7 days, have you experienced any of the following: New or Increased Pain, New or Increased Fatigue, Loneliness, Social Isolation, Stress or Anger?: (!) Yes  Select all that apply: (!) New or Increased Fatigue  Do you get the social and emotional support that you need?: Yes  Do you have a Living Will?: (!) No    Advance Directives     Power of  Living Will ACP-Advance Directive ACP-Power of     Not on File Not on File Not on File Not on File      General Health Risk Interventions:  · No Living Will: Advance Care Planning addressed with patient today      Safety:  Do you have working smoke detectors?: Yes  Do you have any tripping hazards - loose or unsecured carpets or rugs?: No  Do you have any tripping hazards - clutter in doorways, halls, or stairs?: No  Do you have either shower bars, grab bars, non-slip mats or non-slip surfaces in your shower or bathtub?: Yes  Do all of your stairways have a railing or banister?: (!) No  Do you always fasten your seatbelt when you are in a car?: Yes    Safety Interventions:  · Home safety tips provided           Objective   Vitals:    03/02/22 0833   BP: 110/70   Site: Right Upper Arm   Position: Sitting   Cuff Size: Medium Adult   Pulse: 54   Resp: 14   Temp: 97.4 °F (36.3 °C)   TempSrc: Temporal   SpO2: 98%   Weight: 186 lb 9.6 oz (84.6 kg)   Height: 6' 1\" (1.854 m)      Body mass index is 24.62 kg/m². Allergies   Allergen Reactions    Adhesive Tape Rash     Prior to Visit Medications    Medication Sig Taking? Authorizing Provider   pantoprazole (PROTONIX) 40 MG tablet TAKE 1 TABLET BY MOUTH EVERY DAY Yes Yair Davis MD   triazolam (HALCION) 0.25 MG tablet TAKE ONE TABLET BY MOUTH ONCE NIGHTLY Yes Braxton Bell MD   metoprolol succinate (TOPROL XL) 25 MG extended release tablet Take 1 tablet by mouth daily Yes Nelia Prabha, APRN - CNP   meclizine (ANTIVERT) 25 MG tablet Take 1 tablet by mouth 3 times daily as needed for Dizziness Yes Yair Davis MD   clindamycin (CLINDAGEL) 1 % gel FOR TOPICAL APPLICATION AS DIRECTED BY PRESCRIBER TWICE AS NEEDED Yes Yair Davis MD   albuterol sulfate  (90 Base) MCG/ACT inhaler TAKE 2 PUFFS BY MOUTH EVERY 6 HOURS AS NEEDED FOR WHEEZE Yes Yair Davis MD   clonazePAM (KLONOPIN) 0.5 MG tablet Take 1 tablet by mouth daily as needed for Anxiety for up to 30 days.  Yes Yair Davis MD   rosuvastatin (CRESTOR) 20 MG tablet Take 20 mg by mouth daily Yes Historical Provider, MD   aspirin 81 MG EC tablet Take 81 mg by mouth daily Yes Historical Provider, MD   Ascorbic Acid (VITAMIN C) 500 MG CAPS Take 1 capsule by mouth daily Yes Historical Provider, MD   Multiple Vitamins-Minerals (MULTIVITAMIN ADULTS 50+) TABS Take by mouth daily Yes Historical Provider, MD   Simethicone (GAS-X PO) Take by mouth as needed Yes Historical Provider, MD   acetaminophen (TYLENOL) 325 MG tablet Take 650 mg by mouth every 6 hours as needed for Pain Yes Historical Provider, MD   sucralfate (CARAFATE) 1 GM tablet Take 1 g by mouth as needed Yes Historical Provider, MD [place] (Including outside providers/suppliers regularly involved in providing care):   Patient Care Team:  Vania Yepez MD as PCP - General (Internal Medicine)  Vania Yepez MD as PCP - REHABILITATION [facility] Provider  Zamzam Haro DPM as Consulting Physician (Podiatry)    Reviewed and updated this visit:  Tobacco  Allergies  Meds  Problems  Med Hx  Surg Hx  Soc Hx  Fam Hx

## 2022-03-02 NOTE — PATIENT INSTRUCTIONS
Personalized Preventive Plan for Kelsie Mooney - 3/2/2022  Medicare offers a range of preventive health benefits. Some of the tests and screenings are paid in full while other may be subject to a deductible, co-insurance, and/or copay. Some of these benefits include a comprehensive review of your medical history including lifestyle, illnesses that may run in your family, and various assessments and screenings as appropriate. After reviewing your medical record and screening and assessments performed today your provider may have ordered immunizations, labs, imaging, and/or referrals for you. A list of these orders (if applicable) as well as your Preventive Care list are included within your After Visit Summary for your review. Other Preventive Recommendations:    · A preventive eye exam performed by an eye specialist is recommended every 1-2 years to screen for glaucoma; cataracts, macular degeneration, and other eye disorders. · A preventive dental visit is recommended every 6 months. · Try to get at least 150 minutes of exercise per week or 10,000 steps per day on a pedometer . · Order or download the FREE \"Exercise & Physical Activity: Your Everyday Guide\" from [employer]. Call 0-996.508.5610 or search [employer] online. · You need 3680-9994 mg of calcium and 7809-3121 IU of vitamin D per day. It is possible to meet your calcium requirement with diet alone, but a vitamin D supplement is usually necessary to meet this goal.  · When exposed to the sun, use a sunscreen that protects against both UVA and UVB radiation with an SPF of 30 or greater. Reapply every 2 to 3 hours or after sweating, drying off with a towel, or swimming. · Always wear a seat belt when traveling in a car. Always wear a helmet when riding a bicycle or motorcycle.

## 2022-03-02 NOTE — TELEPHONE ENCOUNTER
Spoke with pt states had Crestor refill taken care of by PCP today, Dr Masood Hirsch and also reviewed lab results at [facility] today as well.

## 2022-03-03 ENCOUNTER — PATIENT MESSAGE (OUTPATIENT)
Dept: INTERNAL MEDICINE CLINIC | Age: 72
End: 2022-03-03

## 2022-03-04 NOTE — TELEPHONE ENCOUNTER
From: Rosario Oden  To: Dr. Jeremias Segundo  Sent: 3/3/2022 9:20 AM EST  Subject: Rosuvastatin     Thanks for the Pantoprazole.  We talk Ed about rosuvastatin if you can fill that it is appreciated seems having problem mma cardio so Im  Going back to dr David Man at [facility] thank you

## 2022-03-16 ENCOUNTER — PATIENT MESSAGE (OUTPATIENT)
Dept: INTERNAL MEDICINE CLINIC | Age: 72
End: 2022-03-16

## 2022-03-16 DIAGNOSIS — F41.9 ANXIETY: ICD-10-CM

## 2022-03-16 RX ORDER — CLONAZEPAM 0.5 MG/1
0.5 TABLET ORAL DAILY PRN
Qty: 30 TABLET | Refills: 0 | Status: SHIPPED | OUTPATIENT
Start: 2022-03-16 | End: 2022-08-08 | Stop reason: SDUPTHER

## 2022-03-16 NOTE — TELEPHONE ENCOUNTER
From: Robby Sage  To: Dr. Ronel Shah  Sent: 3/16/2022 11:28 AM EDT  Subject: Clonazepam    Been having the dizzy have 2 more clonazepam left really need a refill this helps when meclizine doesnt work thanks

## 2022-03-28 RX ORDER — MECLIZINE HYDROCHLORIDE 25 MG/1
TABLET ORAL
Qty: 30 TABLET | Refills: 1 | Status: SHIPPED | OUTPATIENT
Start: 2022-03-28 | End: 2022-08-08 | Stop reason: SDUPTHER

## 2022-04-01 PROBLEM — Z00.00 WELL ADULT EXAM: Status: RESOLVED | Noted: 2021-02-22 | Resolved: 2022-04-01

## 2022-04-06 ENCOUNTER — PATIENT MESSAGE (OUTPATIENT)
Dept: INTERNAL MEDICINE CLINIC | Age: 72
End: 2022-04-06

## 2022-04-06 DIAGNOSIS — F51.01 PRIMARY INSOMNIA: ICD-10-CM

## 2022-04-06 NOTE — TELEPHONE ENCOUNTER
From: Kelvin Llanos  To: Dr. Jeannette Morris: 4/6/2022 9:10 AM EDT  Subject: Refill    Hi Dr. Jasen Zavala and Sangeetha Albrecht wondering if you could refill my triazolam appreciate it very much thank you

## 2022-05-07 DIAGNOSIS — F51.01 PRIMARY INSOMNIA: ICD-10-CM

## 2022-06-15 ENCOUNTER — PATIENT MESSAGE (OUTPATIENT)
Dept: INTERNAL MEDICINE CLINIC | Age: 72
End: 2022-06-15

## 2022-06-15 DIAGNOSIS — F51.01 PRIMARY INSOMNIA: ICD-10-CM

## 2022-06-16 ENCOUNTER — TELEPHONE (OUTPATIENT)
Dept: ORTHOPEDIC SURGERY | Age: 72
End: 2022-06-16

## 2022-06-16 NOTE — TELEPHONE ENCOUNTER
Received a PA request for Clindamycin Phosphate 1% gel. What is the Dx/ICD-10 code for this medication? Please respond to the pool ( P [id number]). Thank you.

## 2022-06-16 NOTE — TELEPHONE ENCOUNTER
Patient states to disregard message, patient paid cash and has received the medication. Note will be closed.

## 2022-06-16 NOTE — TELEPHONE ENCOUNTER
From: Tish Noonan  To: Dr. Mary Laurent  Sent: 6/15/2022 10:35 AM EDT  Subject: Leonides Otto Dr. Severa Living and Nirali I need a refill of my triazolam I would appreciate it so much thank you stay well

## 2022-06-20 ENCOUNTER — OFFICE VISIT (OUTPATIENT)
Dept: INTERNAL MEDICINE CLINIC | Age: 72
End: 2022-06-20
Payer: MEDICARE

## 2022-06-20 VITALS
SYSTOLIC BLOOD PRESSURE: 120 MMHG | BODY MASS INDEX: 23.8 KG/M2 | WEIGHT: 179.6 LBS | OXYGEN SATURATION: 100 % | RESPIRATION RATE: 12 BRPM | HEIGHT: 73 IN | HEART RATE: 48 BPM | DIASTOLIC BLOOD PRESSURE: 70 MMHG | TEMPERATURE: 97.3 F

## 2022-06-20 DIAGNOSIS — R35.0 BENIGN PROSTATIC HYPERPLASIA WITH URINARY FREQUENCY: ICD-10-CM

## 2022-06-20 DIAGNOSIS — N40.1 BENIGN PROSTATIC HYPERPLASIA WITH URINARY FREQUENCY: ICD-10-CM

## 2022-06-20 DIAGNOSIS — J43.2 CENTRILOBULAR EMPHYSEMA (HCC): ICD-10-CM

## 2022-06-20 DIAGNOSIS — K21.9 GASTROESOPHAGEAL REFLUX DISEASE WITHOUT ESOPHAGITIS: ICD-10-CM

## 2022-06-20 DIAGNOSIS — I25.10 CORONARY ARTERY DISEASE INVOLVING NATIVE CORONARY ARTERY OF NATIVE HEART WITHOUT ANGINA PECTORIS: ICD-10-CM

## 2022-06-20 DIAGNOSIS — E78.5 HYPERLIPIDEMIA LDL GOAL <70: ICD-10-CM

## 2022-06-20 DIAGNOSIS — G43.109 MIGRAINE WITH AURA AND WITHOUT STATUS MIGRAINOSUS, NOT INTRACTABLE: ICD-10-CM

## 2022-06-20 DIAGNOSIS — F51.01 PRIMARY INSOMNIA: ICD-10-CM

## 2022-06-20 PROCEDURE — G8420 CALC BMI NORM PARAMETERS: HCPCS | Performed by: INTERNAL MEDICINE

## 2022-06-20 PROCEDURE — 1036F TOBACCO NON-USER: CPT | Performed by: INTERNAL MEDICINE

## 2022-06-20 PROCEDURE — 99214 OFFICE O/P EST MOD 30 MIN: CPT | Performed by: INTERNAL MEDICINE

## 2022-06-20 PROCEDURE — 1123F ACP DISCUSS/DSCN MKR DOCD: CPT | Performed by: INTERNAL MEDICINE

## 2022-06-20 PROCEDURE — 3017F COLORECTAL CA SCREEN DOC REV: CPT | Performed by: INTERNAL MEDICINE

## 2022-06-20 PROCEDURE — G8427 DOCREV CUR MEDS BY ELIG CLIN: HCPCS | Performed by: INTERNAL MEDICINE

## 2022-06-20 PROCEDURE — 3023F SPIROM DOC REV: CPT | Performed by: INTERNAL MEDICINE

## 2022-06-20 RX ORDER — RIMEGEPANT SULFATE 75 MG/75MG
75 TABLET, ORALLY DISINTEGRATING ORAL PRN
Qty: 12 TABLET | Refills: 1
Start: 2022-06-20

## 2022-06-20 ASSESSMENT — ENCOUNTER SYMPTOMS
PHOTOPHOBIA: 1
CHEST TIGHTNESS: 1
HEARTBURN: 1
VISUAL CHANGE: 1
ALLERGIC/IMMUNOLOGIC NEGATIVE: 1
BLURRED VISION: 1

## 2022-06-20 ASSESSMENT — PATIENT HEALTH QUESTIONNAIRE - PHQ9
SUM OF ALL RESPONSES TO PHQ QUESTIONS 1-9: 0
SUM OF ALL RESPONSES TO PHQ9 QUESTIONS 1 & 2: 0
2. FEELING DOWN, DEPRESSED OR HOPELESS: 0
SUM OF ALL RESPONSES TO PHQ QUESTIONS 1-9: 0
1. LITTLE INTEREST OR PLEASURE IN DOING THINGS: 0

## 2022-06-20 NOTE — ASSESSMENT & PLAN NOTE
Borderline controlled, continue current medications and lifestyle modifications recommendedcont diet and PPI has not been taking Carafate but will try prn for 1-2 weeks  If no relief to se Dr Jaimie Crystal

## 2022-06-20 NOTE — PROGRESS NOTES
Subjective:      Patient ID: Tatyana Tony is a 71 y.o. male. HPI  Here today for follow up of chronic problems as per HPI and as problems listed under assessment and plan,no new c/o feels good     Heartburn  He complains of heartburn. He reports no chest pain. This is a chronic problem. The current episode started more than 1 year ago. The problem occurs occasionally. The problem has been waxing and waning. The symptoms are aggravated by certain foods and stress. He has tried an antacid, a PPI and a diet change (cont Carafate prn as noted ) for the symptoms. The treatment provided mild relief. Past procedures include an EGD. Migraine   This is a chronic problem. The current episode started more than 1 year ago. The problem occurs intermittently. The problem has been waxing and waning. The pain is located in the left unilateral region. The pain quality is similar to prior headaches. The quality of the pain is described as aching. The pain is at a severity of 4/10. The pain is mild. Associated symptoms include blurred vision, dizziness, photophobia and a visual change. The symptoms are aggravated by unknown. He has tried acetaminophen for the symptoms. The treatment provided mild relief. His past medical history is significant for migraine headaches. Hyperlipidemia  This is a chronic problem. The current episode started more than 1 year ago. The problem is controlled. Recent lipid tests were reviewed and are normal. Pertinent negatives include no chest pain. Current antihyperlipidemic treatment includes diet change, statins and exercise. The current treatment provides mild improvement of lipids. There are no compliance problems. Risk factors for coronary artery disease include male sex (known CAD ). Coronary Artery Disease  Presents for follow-up visit. Symptoms include chest tightness and dizziness. Pertinent negatives include no chest pain, chest pressure, leg swelling or palpitations.  Risk factors include hyperlipidemia. The symptoms have been stable. Compliance with diet is good. Compliance with exercise is variable. Compliance with medications is good. Allergies   Allergen Reactions    Adhesive Tape Rash       Current Outpatient Medications   Medication Sig Dispense Refill    Rimegepant Sulfate (NURTEC) 75 MG TBDP Take 75 mg by mouth as needed (HA) 12 tablet 1    triazolam (HALCION) 0.25 MG tablet TAKE ONE TABLET BY MOUTH ONCE NIGHTLY 30 tablet 0    meclizine (ANTIVERT) 25 MG tablet TAKE ONE TABLET BY MOUTH THREE TIMES A DAY AS NEEDED FOR DIZZINESS 30 tablet 1    clonazePAM (KLONOPIN) 0.5 MG tablet Take 1 tablet by mouth daily as needed for Anxiety for up to 30 days. 30 tablet 0    pantoprazole (PROTONIX) 40 MG tablet TAKE 1 TABLET BY MOUTH EVERY DAY 90 tablet 1    rosuvastatin (CRESTOR) 20 MG tablet Take 1 tablet by mouth daily 90 tablet 1    metoprolol succinate (TOPROL XL) 25 MG extended release tablet Take 1 tablet by mouth daily 90 tablet 3    clindamycin (CLINDAGEL) 1 % gel FOR TOPICAL APPLICATION AS DIRECTED BY PRESCRIBER TWICE AS NEEDED 30 g 3    albuterol sulfate  (90 Base) MCG/ACT inhaler TAKE 2 PUFFS BY MOUTH EVERY 6 HOURS AS NEEDED FOR WHEEZE 18 each 1    aspirin 81 MG EC tablet Take 81 mg by mouth daily      Ascorbic Acid (VITAMIN C) 500 MG CAPS Take 1 capsule by mouth daily      Multiple Vitamins-Minerals (MULTIVITAMIN ADULTS 50+) TABS Take by mouth daily      Simethicone (GAS-X PO) Take by mouth as needed      acetaminophen (TYLENOL) 325 MG tablet Take 650 mg by mouth every 6 hours as needed for Pain       No current facility-administered medications for this visit.        Past Medical History:   Diagnosis Date    Acid reflux     Asthma     CAD (coronary artery disease)     COPD (chronic obstructive pulmonary disease) ([facility])     mild    Hyperlipidemia     Vertigo        Family History   Problem Relation Age of Onset    Diabetes Mother     Heart Disease Father    [facility] Coronary Art Dis Brother        Past Surgical History:   Procedure Laterality Date    COLONOSCOPY      2017, diverticulosis, repeat 5/15/2026 Dr. Goins Rising      2 Vessels     ESOPHAGEAL DILATATION      INGUINAL HERNIA REPAIR Right     PROSTATE SURGERY  2017    TURP       Social History     Socioeconomic History    Marital status: Single     Spouse name: Not on file    Number of children: Not on file    Years of education: Not on file    Highest education level: Not on file   Occupational History    Not on file   Tobacco Use    Smoking status: Former Smoker     Packs/day: 1.00     Years: 50.00     Pack years: 50.00     Types: Cigarettes     Quit date: 2018     Years since quittin.3    Smokeless tobacco: Never Used   Vaping Use    Vaping Use: Never used   Substance and Sexual Activity    Alcohol use: Not Currently     Comment: Rarely    Drug use: Not Currently    Sexual activity: Not on file   Other Topics Concern    Not on file   Social History Narrative    Not on file     Social Determinants of Health     Financial Resource Strain: Low Risk     Difficulty of Paying Living Expenses: Not hard at all   Food Insecurity: No Food Insecurity    Worried About Running Out of Food in the Last Year: Never true    [place] of Food in the Last Year: Never true   Transportation Needs:     Lack of Transportation (Medical): Not on file    Lack of Transportation (Non-Medical):  Not on file   Physical Activity: Unknown    Days of Exercise per Week: 0 days    Minutes of Exercise per Session: Not on file   Stress:     Feeling of Stress : Not on file   Social Connections:     Frequency of Communication with Friends and Family: Not on file    Frequency of Social Gatherings with Friends and Family: Not on file    Attends Pentecostalism Services: Not on file    Active Member of Clubs or Organizations: Not on file    Attends Club or Organization Meetings: Not on file    Marital Status: Not on file   Intimate Partner Violence:     Fear of Current or Ex-Partner: Not on file    Emotionally Abused: Not on file    Physically Abused: Not on file    Sexually Abused: Not on file   Housing Stability:     Unable to Pay for Housing in the Last Year: Not on file    Number of [place] in the Last Year: Not on file    Unstable Housing in the Last Year: Not on file       Review of Systems  Review of Systems   Constitutional: Negative for unexpected weight change. HENT: Positive for congestion and postnasal drip. Middle ear dizziness Tx with prn Klonapin and anitvert    Eyes: Positive for blurred vision and photophobia. Glasses  Has floaters    Respiratory: Positive for chest tightness. Cardiovascular: Negative for chest pain, palpitations and leg swelling. Gastrointestinal: Positive for heartburn. GERD cont diet and meds    Genitourinary: Positive for decreased urine volume. Nocturia s/p TURP    Musculoskeletal: Positive for arthralgias (in hands ). Allergic/Immunologic: Negative. Neurological: Positive for dizziness. Psychiatric/Behavioral: Positive for sleep disturbance (has taken Halcion for years ). Objective:   Physical Exam:  Physical Exam  Constitutional:       Appearance: Normal appearance. HENT:      Head: Normocephalic and atraumatic. Right Ear: External ear normal.      Left Ear: External ear normal.   Eyes:      Extraocular Movements: Extraocular movements intact. Conjunctiva/sclera: Conjunctivae normal.      Pupils: Pupils are equal, round, and reactive to light. Cardiovascular:      Rate and Rhythm: Normal rate and regular rhythm. Pulses: Normal pulses. Heart sounds: Normal heart sounds. Pulmonary:      Effort: Pulmonary effort is normal.      Breath sounds: Normal breath sounds. Abdominal:      General: Abdomen is flat. Bowel sounds are normal.      Palpations: Abdomen is soft. Musculoskeletal:         General: Normal range of motion. Cervical back: Normal range of motion. Skin:     General: Skin is warm and dry. Neurological:      General: No focal deficit present. Mental Status: He is alert and oriented to person, place, and time. Mental status is at baseline. Psychiatric:         Mood and Affect: Mood normal.         Thought Content:  Thought content normal.         /70 (Site: Right Upper Arm, Position: Sitting, Cuff Size: Medium Adult)   Pulse (!) 48   Temp 97.3 °F (36.3 °C)   Resp 12   Ht 6' 1\" (1.854 m)   Wt 179 lb 9.6 oz (81.5 kg)   SpO2 100%   BMI 23.70 kg/m²       Assessment & Plan:         Coronary artery disease involving native coronary artery   Monitored by specialist- no acute findings meriting change in the plan cont meds and F/U     Hyperlipidemia LDL goal <70   At goal, continue current medications and lifestyle modifications recommended Repeat labs in 6 months      Primary insomnia   Borderline controlled, continue current medications and lifestyle modifications recommended    Benign prostatic hyperplasia with lower urinary tract symptoms   Borderline controlled, lifestyle modifications recommended    Pulmonary emphysema (HCC)   Borderline controlled, continue current medications and lifestyle modifications recommended    Gastroesophageal reflux disease without esophagitis   Borderline controlled, continue current medications and lifestyle modifications recommendedcont diet and PPI has not been taking Carafate but will try prn for 1-2 weeks  If no relief to se Dr Josue Archuleta     Migraine with aura, not intractable   On and off in the past usually just aura and visual changes occ HA no current meds will try Nurtec prn

## 2022-06-28 ENCOUNTER — TELEPHONE (OUTPATIENT)
Dept: INTERNAL MEDICINE CLINIC | Age: 72
End: 2022-06-28

## 2022-06-28 NOTE — TELEPHONE ENCOUNTER
Pt called and stated that he is still having burning in the chest and he think it is time to see Gastrologist James Nice. pt would like a call back .                     Please call and advise

## 2022-06-28 NOTE — TELEPHONE ENCOUNTER
Neida Porter MD  Physician (Active)  [address] [facility]      [phone]           Patient advised.

## 2022-07-06 ENCOUNTER — OFFICE VISIT (OUTPATIENT)
Dept: CARDIOLOGY CLINIC | Age: 72
End: 2022-07-06
Payer: MEDICARE

## 2022-07-06 VITALS
DIASTOLIC BLOOD PRESSURE: 80 MMHG | BODY MASS INDEX: 23.51 KG/M2 | WEIGHT: 178.2 LBS | SYSTOLIC BLOOD PRESSURE: 110 MMHG | HEART RATE: 56 BPM

## 2022-07-06 DIAGNOSIS — E78.5 HYPERLIPIDEMIA, UNSPECIFIED HYPERLIPIDEMIA TYPE: ICD-10-CM

## 2022-07-06 DIAGNOSIS — Z95.1 HX OF CABG: ICD-10-CM

## 2022-07-06 DIAGNOSIS — I25.10 CAD IN NATIVE ARTERY: Primary | ICD-10-CM

## 2022-07-06 PROCEDURE — 99214 OFFICE O/P EST MOD 30 MIN: CPT | Performed by: INTERNAL MEDICINE

## 2022-07-06 PROCEDURE — G8420 CALC BMI NORM PARAMETERS: HCPCS | Performed by: INTERNAL MEDICINE

## 2022-07-06 PROCEDURE — 1036F TOBACCO NON-USER: CPT | Performed by: INTERNAL MEDICINE

## 2022-07-06 PROCEDURE — 1123F ACP DISCUSS/DSCN MKR DOCD: CPT | Performed by: INTERNAL MEDICINE

## 2022-07-06 PROCEDURE — 3017F COLORECTAL CA SCREEN DOC REV: CPT | Performed by: INTERNAL MEDICINE

## 2022-07-06 PROCEDURE — G8427 DOCREV CUR MEDS BY ELIG CLIN: HCPCS | Performed by: INTERNAL MEDICINE

## 2022-07-06 PROCEDURE — 93000 ELECTROCARDIOGRAM COMPLETE: CPT | Performed by: INTERNAL MEDICINE

## 2022-07-06 ASSESSMENT — ENCOUNTER SYMPTOMS
CHEST TIGHTNESS: 0
ABDOMINAL DISTENTION: 0
CHOKING: 0
SHORTNESS OF BREATH: 0
COUGH: 0
APNEA: 0

## 2022-07-06 NOTE — PROGRESS NOTES
Subjective:      Patient ID: Jessica Weber is a 71 y.o. male. HPI  Former pt Dr Rose King. Follow up for CAD/CABG/lipids. Soreness in chest with picking up. Nerve issues. Belt over chest hurts. Walks 4x per wk. Never exertion related. No pnd /orthopnea. No edema. No tachy/syncope. Some burning in right upper chest /arm. Never with exertion. Vague/mild. Bend over and helps.      Past Medical History:   Diagnosis Date    Acid reflux     Asthma     CAD (coronary artery disease)     COPD (chronic obstructive pulmonary disease) (HCC)     mild    Hyperlipidemia     Vertigo      Past Surgical History:   Procedure Laterality Date    COLONOSCOPY      2017, diverticulosis, repeat 5/15/2026 Dr. Burks Come      2 Vessels     ESOPHAGEAL DILATATION      INGUINAL HERNIA REPAIR Right     PROSTATE SURGERY  2017    TURP     Social History     Socioeconomic History    Marital status: Single     Spouse name: Not on file    Number of children: Not on file    Years of education: Not on file    Highest education level: Not on file   Occupational History    Not on file   Tobacco Use    Smoking status: Former Smoker     Packs/day: 1.00     Years: 50.00     Pack years: 50.00     Types: Cigarettes     Quit date: 2018     Years since quittin.3    Smokeless tobacco: Never Used   Vaping Use    Vaping Use: Never used   Substance and Sexual Activity    Alcohol use: Not Currently     Comment: Rarely    Drug use: Not Currently    Sexual activity: Not on file   Other Topics Concern    Not on file   Social History Narrative    Not on file     Social Determinants of Health     Financial Resource Strain: Low Risk     Difficulty of Paying Living Expenses: Not hard at all   Food Insecurity: No Food Insecurity    Worried About Running Out of Food in the Last Year: Never true    [place] of Food in the Last Year: Never true   Transportation Needs:     Lack of Transportation (Medical): Not on file    Lack of Transportation (Non-Medical): Not on file   Physical Activity: Unknown    Days of Exercise per Week: 0 days    Minutes of Exercise per Session: Not on file   Stress:     Feeling of Stress : Not on file   Social Connections:     Frequency of Communication with Friends and Family: Not on file    Frequency of Social Gatherings with Friends and Family: Not on file    Attends Buddhist Services: Not on file    Active Member of [employer] Group or Organizations: Not on file    Attends Club or Organization Meetings: Not on file    Marital Status: Not on file   Intimate Partner Violence:     Fear of Current or Ex-Partner: Not on file    Emotionally Abused: Not on file    Physically Abused: Not on file    Sexually Abused: Not on file   Housing Stability:     Unable to Pay for Housing in the Last Year: Not on file    Number of [place] in the Last Year: Not on file    Unstable Housing in the Last Year: Not on file     FH reviewd    Vitals:    07/06/22 1337   BP: 110/80   Pulse: 56     Wt 178    Review of Systems   Constitutional: Negative for activity change and fatigue. Respiratory: Negative for apnea, cough, choking, chest tightness and shortness of breath. Cardiovascular: Negative for chest pain, palpitations and leg swelling. No PND or orthopnea. No tachycardia. Gastrointestinal: Negative for abdominal distention. Musculoskeletal: Negative for myalgias. Neurological: Negative for dizziness, syncope and light-headedness. Psychiatric/Behavioral: Negative for agitation, behavioral problems and confusion. All other systems reviewed and are negative. Objective:   Physical Exam  Constitutional:       General: He is not in acute distress. Appearance: Normal appearance. He is well-developed. HENT:      Head: Normocephalic and atraumatic. Right Ear: External ear normal.      Left Ear: External ear normal.   Neck:      Vascular: No JVD. Cardiovascular:      Rate and Rhythm: Normal rate and regular rhythm. Heart sounds: Normal heart sounds. No gallop. Pulmonary:      Effort: Pulmonary effort is normal. No respiratory distress. Breath sounds: Normal breath sounds. No wheezing or rales. Abdominal:      General: Bowel sounds are normal.      Palpations: Abdomen is soft. Tenderness: There is no abdominal tenderness. Musculoskeletal:         General: Normal range of motion. Cervical back: Normal range of motion and neck supple. Skin:     General: Skin is warm and dry. Neurological:      General: No focal deficit present. Mental Status: He is alert and oriented to person, place, and time. Psychiatric:         Mood and Affect: Mood normal.         Behavior: Behavior normal.         Assessment:       Diagnosis Orders   1. CAD in native artery     2. Hx of CABG     3. Hyperlipidemia, unspecified hyperlipidemia type           Plan:      Vague non exertional sx. EKG today shows NSR, Nl axis, WNL. Will continue crestor 20 mg qd, toprol 25 mg qd, asa 81 mg qd for CAD/lipids. Reviewed previous records and testing including myoview 4/21. No changes. Continue to monitor. Atypical chest pain will have stress myoview. Follow up 6 months.          Marcel Clemons MD

## 2022-07-12 ENCOUNTER — HOSPITAL ENCOUNTER (OUTPATIENT)
Dept: NON INVASIVE DIAGNOSTICS | Age: 72
Discharge: HOME | End: 2022-07-12
Payer: MEDICARE

## 2022-07-12 DIAGNOSIS — E78.5 HYPERLIPIDEMIA, UNSPECIFIED HYPERLIPIDEMIA TYPE: ICD-10-CM

## 2022-07-12 DIAGNOSIS — Z95.1 HX OF CABG: ICD-10-CM

## 2022-07-12 DIAGNOSIS — I25.10 CAD IN NATIVE ARTERY: ICD-10-CM

## 2022-07-12 LAB
LV EF: 66 %
LVEF MODALITY: NORMAL

## 2022-07-12 PROCEDURE — A9502 TC99M TETROFOSMIN: HCPCS | Performed by: INTERNAL MEDICINE

## 2022-07-12 PROCEDURE — 3430000000 HC RX DIAGNOSTIC RADIOPHARMACEUTICAL: Performed by: INTERNAL MEDICINE

## 2022-07-12 PROCEDURE — 78452 HT MUSCLE IMAGE SPECT MULT: CPT

## 2022-07-12 PROCEDURE — 93017 CV STRESS TEST TRACING ONLY: CPT

## 2022-07-12 RX ADMIN — TETROFOSMIN 30 MILLICURIE: 1.38 INJECTION, POWDER, LYOPHILIZED, FOR SOLUTION INTRAVENOUS at 11:00

## 2022-07-12 RX ADMIN — TETROFOSMIN 10 MILLICURIE: 1.38 INJECTION, POWDER, LYOPHILIZED, FOR SOLUTION INTRAVENOUS at 08:14

## 2022-07-18 ENCOUNTER — PATIENT MESSAGE (OUTPATIENT)
Dept: INTERNAL MEDICINE CLINIC | Age: 72
End: 2022-07-18

## 2022-07-18 DIAGNOSIS — F51.01 PRIMARY INSOMNIA: Primary | ICD-10-CM

## 2022-07-19 NOTE — TELEPHONE ENCOUNTER
From: Nichole Reza  To: Dr. Aparna Norwood  Sent: 7/18/2022 8:02 AM EDT  Subject: Aria Rucker     Thank you In advance.  Could you please refill my triazolam

## 2022-08-08 DIAGNOSIS — F41.9 ANXIETY: ICD-10-CM

## 2022-08-08 RX ORDER — CLONAZEPAM 0.5 MG/1
0.5 TABLET ORAL DAILY PRN
Qty: 30 TABLET | Refills: 0 | Status: SHIPPED | OUTPATIENT
Start: 2022-08-08 | End: 2022-09-07

## 2022-08-08 RX ORDER — MECLIZINE HYDROCHLORIDE 25 MG/1
25 TABLET ORAL 3 TIMES DAILY PRN
Qty: 30 TABLET | Refills: 1 | Status: SHIPPED | OUTPATIENT
Start: 2022-08-08

## 2022-08-23 DIAGNOSIS — I25.10 CORONARY ARTERY DISEASE INVOLVING NATIVE CORONARY ARTERY OF NATIVE HEART WITHOUT ANGINA PECTORIS: ICD-10-CM

## 2022-08-23 DIAGNOSIS — E78.5 HYPERLIPIDEMIA LDL GOAL <70: ICD-10-CM

## 2022-08-23 DIAGNOSIS — F51.01 PRIMARY INSOMNIA: ICD-10-CM

## 2022-08-23 DIAGNOSIS — R35.1 NOCTURIA: ICD-10-CM

## 2022-08-23 DIAGNOSIS — K21.9 GASTROESOPHAGEAL REFLUX DISEASE WITHOUT ESOPHAGITIS: ICD-10-CM

## 2022-08-23 DIAGNOSIS — M79.672 LEFT FOOT PAIN: ICD-10-CM

## 2022-08-23 DIAGNOSIS — N40.1 BENIGN PROSTATIC HYPERPLASIA WITH URINARY FREQUENCY: ICD-10-CM

## 2022-08-23 DIAGNOSIS — R42 VERTIGO: ICD-10-CM

## 2022-08-23 DIAGNOSIS — R35.0 BENIGN PROSTATIC HYPERPLASIA WITH URINARY FREQUENCY: ICD-10-CM

## 2022-08-23 DIAGNOSIS — J30.1 ALLERGIC RHINITIS DUE TO POLLEN, UNSPECIFIED SEASONALITY: ICD-10-CM

## 2022-08-23 DIAGNOSIS — J43.2 CENTRILOBULAR EMPHYSEMA (HCC): ICD-10-CM

## 2022-08-23 LAB
A/G RATIO: 1.8 (ref 1.1–2.2)
ALBUMIN SERPL-MCNC: 4.3 G/DL (ref 3.4–5)
ALP BLD-CCNC: 68 U/L (ref 40–129)
ALT SERPL-CCNC: 15 U/L (ref 10–40)
ANION GAP SERPL CALCULATED.3IONS-SCNC: 12 MMOL/L (ref 3–16)
AST SERPL-CCNC: 19 U/L (ref 15–37)
BASOPHILS ABSOLUTE: 0 K/UL (ref 0–0.2)
BASOPHILS RELATIVE PERCENT: 0.1 %
BILIRUB SERPL-MCNC: 0.6 MG/DL (ref 0–1)
BILIRUBIN URINE: NEGATIVE
BLOOD, URINE: NEGATIVE
BUN BLDV-MCNC: 19 MG/DL (ref 7–20)
CALCIUM SERPL-MCNC: 9.5 MG/DL (ref 8.3–10.6)
CHLORIDE BLD-SCNC: 105 MMOL/L (ref 99–110)
CHOLESTEROL, TOTAL: 85 MG/DL (ref 0–199)
CLARITY: CLEAR
CO2: 27 MMOL/L (ref 21–32)
COLOR: YELLOW
CREAT SERPL-MCNC: 0.8 MG/DL (ref 0.8–1.3)
EOSINOPHILS ABSOLUTE: 0.1 K/UL (ref 0–0.6)
EOSINOPHILS RELATIVE PERCENT: 2.6 %
GFR AFRICAN AMERICAN: >60
GFR NON-AFRICAN AMERICAN: >60
GLUCOSE BLD-MCNC: 98 MG/DL (ref 70–99)
GLUCOSE URINE: NEGATIVE MG/DL
HCT VFR BLD CALC: 42.3 % (ref 40.5–52.5)
HDLC SERPL-MCNC: 44 MG/DL (ref 40–60)
HEMOGLOBIN: 14.2 G/DL (ref 13.5–17.5)
KETONES, URINE: NEGATIVE MG/DL
LDL CHOLESTEROL CALCULATED: 20 MG/DL
LEUKOCYTE ESTERASE, URINE: NEGATIVE
LYMPHOCYTES ABSOLUTE: 1 K/UL (ref 1–5.1)
LYMPHOCYTES RELATIVE PERCENT: 20.1 %
MCH RBC QN AUTO: 32.2 PG (ref 26–34)
MCHC RBC AUTO-ENTMCNC: 33.6 G/DL (ref 31–36)
MCV RBC AUTO: 95.9 FL (ref 80–100)
MICROSCOPIC EXAMINATION: NORMAL
MONOCYTES ABSOLUTE: 0.3 K/UL (ref 0–1.3)
MONOCYTES RELATIVE PERCENT: 6.9 %
NEUTROPHILS ABSOLUTE: 3.6 K/UL (ref 1.7–7.7)
NEUTROPHILS RELATIVE PERCENT: 70.3 %
NITRITE, URINE: NEGATIVE
PDW BLD-RTO: 13 % (ref 12.4–15.4)
PH UA: 7 (ref 5–8)
PLATELET # BLD: 187 K/UL (ref 135–450)
PMV BLD AUTO: 9.6 FL (ref 5–10.5)
POTASSIUM SERPL-SCNC: 4 MMOL/L (ref 3.5–5.1)
PROSTATE SPECIFIC ANTIGEN: 1.91 NG/ML (ref 0–4)
PROTEIN UA: NEGATIVE MG/DL
RBC # BLD: 4.42 M/UL (ref 4.2–5.9)
SODIUM BLD-SCNC: 144 MMOL/L (ref 136–145)
SPECIFIC GRAVITY UA: 1.02 (ref 1–1.03)
TOTAL PROTEIN: 6.7 G/DL (ref 6.4–8.2)
TRIGL SERPL-MCNC: 107 MG/DL (ref 0–150)
TSH REFLEX: 2.99 UIU/ML (ref 0.27–4.2)
URINE REFLEX TO CULTURE: NORMAL
URINE TYPE: NORMAL
UROBILINOGEN, URINE: 0.2 E.U./DL
VLDLC SERPL CALC-MCNC: 21 MG/DL
WBC # BLD: 5.1 K/UL (ref 4–11)

## 2022-08-29 ENCOUNTER — TELEPHONE (OUTPATIENT)
Dept: INTERNAL MEDICINE CLINIC | Age: 72
End: 2022-08-29

## 2022-08-29 RX ORDER — METOPROLOL SUCCINATE 25 MG/1
50 TABLET, EXTENDED RELEASE ORAL DAILY
Qty: 90 TABLET | Refills: 3 | Status: CANCELLED
Start: 2022-08-29

## 2022-08-31 ENCOUNTER — OFFICE VISIT (OUTPATIENT)
Dept: INTERNAL MEDICINE CLINIC | Age: 72
End: 2022-08-31
Payer: MEDICARE

## 2022-08-31 VITALS
RESPIRATION RATE: 12 BRPM | OXYGEN SATURATION: 99 % | DIASTOLIC BLOOD PRESSURE: 70 MMHG | HEIGHT: 72 IN | BODY MASS INDEX: 24.11 KG/M2 | HEART RATE: 60 BPM | SYSTOLIC BLOOD PRESSURE: 122 MMHG | WEIGHT: 178 LBS

## 2022-08-31 DIAGNOSIS — E78.5 HYPERLIPIDEMIA LDL GOAL <70: ICD-10-CM

## 2022-08-31 DIAGNOSIS — F51.01 PRIMARY INSOMNIA: ICD-10-CM

## 2022-08-31 DIAGNOSIS — G43.109 MIGRAINE WITH AURA AND WITHOUT STATUS MIGRAINOSUS, NOT INTRACTABLE: ICD-10-CM

## 2022-08-31 DIAGNOSIS — K21.9 GASTROESOPHAGEAL REFLUX DISEASE WITHOUT ESOPHAGITIS: ICD-10-CM

## 2022-08-31 DIAGNOSIS — J30.1 ALLERGIC RHINITIS DUE TO POLLEN, UNSPECIFIED SEASONALITY: ICD-10-CM

## 2022-08-31 DIAGNOSIS — I25.10 CORONARY ARTERY DISEASE INVOLVING NATIVE CORONARY ARTERY OF NATIVE HEART WITHOUT ANGINA PECTORIS: ICD-10-CM

## 2022-08-31 DIAGNOSIS — J43.2 CENTRILOBULAR EMPHYSEMA (HCC): ICD-10-CM

## 2022-08-31 DIAGNOSIS — R10.30 LOWER ABDOMINAL PAIN: ICD-10-CM

## 2022-08-31 PROCEDURE — G8420 CALC BMI NORM PARAMETERS: HCPCS | Performed by: INTERNAL MEDICINE

## 2022-08-31 PROCEDURE — 3023F SPIROM DOC REV: CPT | Performed by: INTERNAL MEDICINE

## 2022-08-31 PROCEDURE — 1036F TOBACCO NON-USER: CPT | Performed by: INTERNAL MEDICINE

## 2022-08-31 PROCEDURE — 99214 OFFICE O/P EST MOD 30 MIN: CPT | Performed by: INTERNAL MEDICINE

## 2022-08-31 PROCEDURE — 1123F ACP DISCUSS/DSCN MKR DOCD: CPT | Performed by: INTERNAL MEDICINE

## 2022-08-31 PROCEDURE — 3017F COLORECTAL CA SCREEN DOC REV: CPT | Performed by: INTERNAL MEDICINE

## 2022-08-31 PROCEDURE — G8427 DOCREV CUR MEDS BY ELIG CLIN: HCPCS | Performed by: INTERNAL MEDICINE

## 2022-08-31 RX ORDER — DICYCLOMINE HCL 20 MG
20 TABLET ORAL 3 TIMES DAILY PRN
Qty: 90 TABLET | Refills: 1 | Status: SHIPPED | OUTPATIENT
Start: 2022-08-31

## 2022-08-31 ASSESSMENT — ENCOUNTER SYMPTOMS
VISUAL CHANGE: 1
BLURRED VISION: 1
ABDOMINAL PAIN: 1
DIARRHEA: 1
ALLERGIC/IMMUNOLOGIC NEGATIVE: 1
CHEST TIGHTNESS: 1
PHOTOPHOBIA: 1
HEARTBURN: 1

## 2022-08-31 NOTE — ASSESSMENT & PLAN NOTE
Well-controlled, continue current medications and lifestyle modifications recommended cont F/U with cardiology

## 2022-08-31 NOTE — PROGRESS NOTES
Subjective:      Patient ID: Jigar Interiano is a 71 y.o. male. HPI  Here today for follow up of chronic problems as per HPI and as problems listed under assessment and plan,no new c/o feels good other than lower abd cramping on and off and occ diarrhea due for EGD and colonoscopy soon 9/21/22    GI Problem  The primary symptoms include abdominal pain, diarrhea and arthralgias (in hands ). Primary symptoms do not include fever or weight loss. The illness began more than 7 days ago. The problem has not changed since onset. Significant associated medical issues include GERD and irritable bowel syndrome. Heartburn  He complains of abdominal pain and heartburn. He reports no chest pain. This is a chronic problem. The current episode started more than 1 year ago. The problem occurs occasionally. The problem has been waxing and waning. The symptoms are aggravated by certain foods and stress. Pertinent negatives include no weight loss. He has tried an antacid, a PPI and a diet change (cont Carafate prn as noted ) for the symptoms. The treatment provided mild relief. Past procedures include an EGD. Migraine   This is a chronic problem. The current episode started more than 1 year ago. The problem occurs intermittently. The problem has been waxing and waning. The pain is located in the Left unilateral region. The pain quality is similar to prior headaches. The quality of the pain is described as aching. The pain is at a severity of 4/10. The pain is mild. Associated symptoms include abdominal pain, blurred vision, dizziness, photophobia and a visual change. Pertinent negatives include no fever or weight loss. The symptoms are aggravated by unknown. He has tried acetaminophen for the symptoms. The treatment provided mild relief. His past medical history is significant for migraine headaches. Hyperlipidemia  This is a chronic problem. The current episode started more than 1 year ago. The problem is controlled.  Recent lipid tests were reviewed and are normal. Pertinent negatives include no chest pain. Current antihyperlipidemic treatment includes diet change, statins and exercise. The current treatment provides mild improvement of lipids. There are no compliance problems. Risk factors for coronary artery disease include male sex (known CAD ). Coronary Artery Disease  Presents for follow-up visit. Symptoms include chest tightness and dizziness. Pertinent negatives include no chest pain, chest pressure, leg swelling or palpitations. Risk factors include hyperlipidemia. The symptoms have been stable. Compliance with diet is good. Compliance with exercise is variable. Compliance with medications is good. Allergies   Allergen Reactions    Adhesive Tape Rash       Current Outpatient Medications   Medication Sig Dispense Refill    triazolam (HALCION) 0.25 MG tablet TAKE ONE TABLET BY MOUTH ONCE NIGHTLY 30 tablet 0    clonazePAM (KLONOPIN) 0.5 MG tablet Take 1 tablet by mouth daily as needed for Anxiety for up to 30 days.  30 tablet 0    meclizine (ANTIVERT) 25 MG tablet Take 1 tablet by mouth 3 times daily as needed for Dizziness 30 tablet 1    Rimegepant Sulfate (NURTEC) 75 MG TBDP Take 75 mg by mouth as needed (HA) 12 tablet 1    pantoprazole (PROTONIX) 40 MG tablet TAKE 1 TABLET BY MOUTH EVERY DAY 90 tablet 1    rosuvastatin (CRESTOR) 20 MG tablet Take 1 tablet by mouth daily 90 tablet 1    metoprolol succinate (TOPROL XL) 25 MG extended release tablet Take 1 tablet by mouth daily 90 tablet 3    clindamycin (CLINDAGEL) 1 % gel FOR TOPICAL APPLICATION AS DIRECTED BY PRESCRIBER TWICE AS NEEDED 30 g 3    albuterol sulfate  (90 Base) MCG/ACT inhaler TAKE 2 PUFFS BY MOUTH EVERY 6 HOURS AS NEEDED FOR WHEEZE 18 each 1    Multiple Vitamins-Minerals (MULTIVITAMIN ADULTS 50+) TABS Take by mouth daily      Simethicone (GAS-X PO) Take by mouth as needed      acetaminophen (TYLENOL) 325 MG tablet Take 650 mg by mouth every 6 hours as needed for Pain      aspirin 81 MG EC tablet Take 81 mg by mouth daily      Ascorbic Acid (VITAMIN C) 500 MG CAPS Take 1 capsule by mouth daily       No current facility-administered medications for this visit.        Past Medical History:   Diagnosis Date    Acid reflux     Asthma     CAD (coronary artery disease)     COPD (chronic obstructive pulmonary disease) ([facility])     mild    Hyperlipidemia     Vertigo        Family History   Problem Relation Age of Onset    Diabetes Mother     Heart Disease Father     Coronary Art Dis Brother        Past Surgical History:   Procedure Laterality Date    COLONOSCOPY      2017, diverticulosis, repeat 5/15/2026 Dr. Solitario Eng  2019    2 Vessels     ESOPHAGEAL DILATATION      INGUINAL HERNIA REPAIR Right     PROSTATE SURGERY  2017    TURP       Social History     Socioeconomic History    Marital status: Single     Spouse name: Not on file    Number of children: Not on file    Years of education: Not on file    Highest education level: Not on file   Occupational History    Not on file   Tobacco Use    Smoking status: Former     Packs/day: 1.00     Years: 50.00     Pack years: 50.00     Types: Cigarettes     Quit date: 2018     Years since quittin.5    Smokeless tobacco: Never   Vaping Use    Vaping Use: Never used   Substance and Sexual Activity    Alcohol use: Not Currently     Comment: Rarely    Drug use: Not Currently    Sexual activity: Not on file   Other Topics Concern    Not on file   Social History Narrative    Not on file     Social Determinants of Health     Financial Resource Strain: Not on file   Food Insecurity: Not on file   Transportation Needs: Not on file   Physical Activity: Unknown    Days of Exercise per Week: 0 days    Minutes of Exercise per Session: Not on file   Stress: Not on file   Social Connections: Not on file   Intimate Partner Violence: Not on file   Housing Stability: Not on file       Review of Systems  Review of Systems   Constitutional:  Negative for fever, unexpected weight change and weight loss. HENT:  Positive for congestion and postnasal drip. Middle ear dizziness Tx with prn Klonapin and anitvert    Eyes:  Positive for blurred vision and photophobia. Glasses  Has floaters    Respiratory:  Positive for chest tightness. Cardiovascular:  Negative for chest pain, palpitations and leg swelling. Gastrointestinal:  Positive for abdominal pain, diarrhea and heartburn. GERD cont diet and meds    Genitourinary:  Positive for decreased urine volume. Nocturia s/p TURP    Musculoskeletal:  Positive for arthralgias (in hands ). Allergic/Immunologic: Negative. Neurological:  Positive for dizziness. Psychiatric/Behavioral:  Positive for sleep disturbance (has taken Halcion for years ). Objective:   Physical Exam:  Physical Exam  Constitutional:       Appearance: Normal appearance. HENT:      Head: Normocephalic and atraumatic. Right Ear: External ear normal.      Left Ear: External ear normal.   Eyes:      Extraocular Movements: Extraocular movements intact. Conjunctiva/sclera: Conjunctivae normal.      Pupils: Pupils are equal, round, and reactive to light. Cardiovascular:      Rate and Rhythm: Normal rate and regular rhythm. Pulses: Normal pulses. Heart sounds: Normal heart sounds. Pulmonary:      Effort: Pulmonary effort is normal.      Breath sounds: Normal breath sounds. Abdominal:      General: Abdomen is flat. Bowel sounds are normal.      Palpations: Abdomen is soft. Musculoskeletal:         General: Normal range of motion. Cervical back: Normal range of motion. Skin:     General: Skin is warm and dry. Neurological:      General: No focal deficit present. Mental Status: He is alert and oriented to person, place, and time. Mental status is at baseline.    Psychiatric:         Mood and Affect: Mood normal. Thought Content: Thought content normal.       /70 (Site: Right Upper Arm, Position: Sitting, Cuff Size: Medium Adult)   Pulse 60   Resp 12   Ht 6' (1.829 m)   Wt 178 lb (80.7 kg)   SpO2 99%   BMI 24.14 kg/m²       Assessment & Plan:         Coronary artery disease involving native coronary artery   Well-controlled, continue current medications and lifestyle modifications recommended cont F/U with cardiology     Migraine with aura, not intractable   Well-controlled, continue current medications and lifestyle modifications recommended    Hyperlipidemia LDL goal <70   At goal, continue current medications and lifestyle modifications recommended    Primary insomnia   Borderline controlled, continue current medications and lifestyle modifications recommended    Pulmonary emphysema (HCC)    remains stable cont current meds     Allergic rhinitis   cont Tx with Flonase,Claritin and Mucinex DM prn     Gastroesophageal reflux disease without esophagitis   Monitored by specialist- no acute findings meriting change in the plan cont meds and diet EGD soon     Lower abdominal pain    Cont diet and try Bentyl for spasms  EGD and colonoscopy as note  ?  IBS as cause

## 2022-09-12 RX ORDER — ROSUVASTATIN CALCIUM 20 MG/1
TABLET, COATED ORAL
Qty: 90 TABLET | Refills: 1 | Status: SHIPPED | OUTPATIENT
Start: 2022-09-12

## 2022-09-13 DIAGNOSIS — K21.9 GASTROESOPHAGEAL REFLUX DISEASE WITHOUT ESOPHAGITIS: ICD-10-CM

## 2022-09-14 RX ORDER — PANTOPRAZOLE SODIUM 40 MG/1
TABLET, DELAYED RELEASE ORAL
Qty: 90 TABLET | Refills: 1 | Status: SHIPPED | OUTPATIENT
Start: 2022-09-14

## 2022-09-28 DIAGNOSIS — F51.01 PRIMARY INSOMNIA: ICD-10-CM

## 2022-10-04 NOTE — PROGRESS NOTES
ENDOSCOPY PREOP INSTRUCTIONS      You are scheduled for a procedure at [facility] on 10-5 @ 1130. You will need to arrival by: 1000 (at least an hour & a half prior to planned start time)  Report to the MAIN entrance on [address] and register at the surgery center on the left-hand side of the lobby  You will need your insurance card and photo id    For your procedure:     PLEASE FOLLOW ALL INSTRUCTIONS & PREPS GIVEN TO YOU BY YOUR DOCTOR'S OFFICE. If you have not received these instructions yet, please call the office immediately. Make sure to read them as soon as received. Bring an accurate list of any medications, including the dose/ frequency, with you on the day of the procedure. Make sure to include over the counter medications. If you are taking blood thinners, Aspirin or diabetic medication, make sure to call your doctor as soon as possible for instructions prior to your procedure. Please dress comfortably and do not wear any lotion, powders or jewelry  Arrange for someone to be with you and sign you out & drive you home after your procedure. We allow 2 adults visitors with you in the hospital & everyone is required to wear a mask.     WOMEN ONLY OF CHILDBEARING AGE: Please make sure to be able to give a urine sample on arrival      If you have further questions, you may contact your Endoscopist's office or Pre Admission Testing staff at [phone]

## 2022-10-05 ENCOUNTER — HOSPITAL ENCOUNTER (OUTPATIENT)
Age: 72
Setting detail: OUTPATIENT SURGERY
Discharge: HOME | End: 2022-10-05
Attending: INTERNAL MEDICINE | Admitting: INTERNAL MEDICINE
Payer: MEDICARE

## 2022-10-05 VITALS
HEART RATE: 51 BPM | BODY MASS INDEX: 23.57 KG/M2 | HEIGHT: 72 IN | TEMPERATURE: 97.4 F | SYSTOLIC BLOOD PRESSURE: 127 MMHG | WEIGHT: 174 LBS | RESPIRATION RATE: 14 BRPM | DIASTOLIC BLOOD PRESSURE: 72 MMHG | OXYGEN SATURATION: 98 %

## 2022-10-05 DIAGNOSIS — Z86.010 HISTORY OF COLON POLYPS: ICD-10-CM

## 2022-10-05 PROCEDURE — 88305 TISSUE EXAM BY PATHOLOGIST: CPT

## 2022-10-05 PROCEDURE — 7100000011 HC PHASE II RECOVERY - ADDTL 15 MIN: Performed by: INTERNAL MEDICINE

## 2022-10-05 PROCEDURE — 99152 MOD SED SAME PHYS/QHP 5/>YRS: CPT | Performed by: INTERNAL MEDICINE

## 2022-10-05 PROCEDURE — 2709999900 HC NON-CHARGEABLE SUPPLY: Performed by: INTERNAL MEDICINE

## 2022-10-05 PROCEDURE — 99153 MOD SED SAME PHYS/QHP EA: CPT | Performed by: INTERNAL MEDICINE

## 2022-10-05 PROCEDURE — 2580000003 HC RX 258: Performed by: INTERNAL MEDICINE

## 2022-10-05 PROCEDURE — 3609010600 HC COLONOSCOPY POLYPECTOMY SNARE/COLD BIOPSY: Performed by: INTERNAL MEDICINE

## 2022-10-05 PROCEDURE — 7100000010 HC PHASE II RECOVERY - FIRST 15 MIN: Performed by: INTERNAL MEDICINE

## 2022-10-05 PROCEDURE — 6360000002 HC RX W HCPCS: Performed by: INTERNAL MEDICINE

## 2022-10-05 RX ORDER — SODIUM CHLORIDE, SODIUM LACTATE, POTASSIUM CHLORIDE, CALCIUM CHLORIDE 600; 310; 30; 20 MG/100ML; MG/100ML; MG/100ML; MG/100ML
INJECTION, SOLUTION INTRAVENOUS CONTINUOUS
Status: DISCONTINUED | OUTPATIENT
Start: 2022-10-05 | End: 2022-10-05 | Stop reason: HOSPADM

## 2022-10-05 RX ORDER — MIDAZOLAM HYDROCHLORIDE 1 MG/ML
INJECTION INTRAMUSCULAR; INTRAVENOUS PRN
Status: DISCONTINUED | OUTPATIENT
Start: 2022-10-05 | End: 2022-10-05 | Stop reason: ALTCHOICE

## 2022-10-05 RX ORDER — FENTANYL CITRATE 50 UG/ML
INJECTION, SOLUTION INTRAMUSCULAR; INTRAVENOUS PRN
Status: DISCONTINUED | OUTPATIENT
Start: 2022-10-05 | End: 2022-10-05 | Stop reason: ALTCHOICE

## 2022-10-05 RX ADMIN — SODIUM CHLORIDE, POTASSIUM CHLORIDE, SODIUM LACTATE AND CALCIUM CHLORIDE: 600; 310; 30; 20 INJECTION, SOLUTION INTRAVENOUS at 10:41

## 2022-10-05 ASSESSMENT — PAIN SCALES - WONG BAKER
WONGBAKER_NUMERICALRESPONSE: 0

## 2022-10-05 ASSESSMENT — PAIN SCALES - GENERAL
PAINLEVEL_OUTOF10: 0

## 2022-10-05 ASSESSMENT — PAIN - FUNCTIONAL ASSESSMENT: PAIN_FUNCTIONAL_ASSESSMENT: 0-10

## 2022-10-05 ASSESSMENT — PULMONARY FUNCTION TESTS
PIF_VALUE: 1

## 2022-10-05 ASSESSMENT — PAIN DESCRIPTION - DESCRIPTORS: DESCRIPTORS: CRAMPING

## 2022-10-05 NOTE — DISCHARGE INSTRUCTIONS
ENDOSCOPY DISCHARGE INSTRUCTIONS:    Call the physician that did your procedure for any questions or concerns:           DR. Bull Lie:  [phone]               ACTIVITY:    There are potential side effects to the medications used for sedation and anesthesia during your procedure. These include:  Dizziness or light-headedness, confusion or memory loss, delayed reaction times, loss of coordination, nausea and vomiting. Because of your increased risk for injury, we ask that you observe the following precautions: For the next 24 hours,  DO NOT operate an automobile, bicycle, motorcycle, , power tools or large equipment of any kind. Do not drink alcohol, sign any legal documents or make any legal decisions for 24 hours. Do not bend your head over lower than your heart. DO sit on the side of bed/couch awhile before getting up. Plan on bedrest or quiet relaxation today. You may resume normal activities in 24 hours. DIET:    Your first meal today should be light, avoiding spicy and fatty foods. If you tolerate this first meal,  then you may advance to your regular diet unless otherwise advised by your physician. NORMAL SYMPTOMS:  -Sore throat if youve had an EGD  -Gaseous discomfort    NOTIFY YOUR PHYSICIAN IF THESE SYMPTOMS OCCUR:  1. Fever (greater than 100)  5. Increased abdominal bloating  2. Severe pain    6. Excessive bleeding  3. Nausea and vomiting  7. Chest pain                                                                    4. Chills    8. Shortness of breath      ADDITIONAL INSTRUCTIONS:    Biopsy results:  WILL CALL YOU IN 1 WEEK WITH BIOPSY RESULTS. Educational Information:       Colon Polyps: Care Instructions  Your Care Instructions     Colon polyps are growths in the colon or the rectum. The cause of most colon polyps is not known, and most people who get them do not have any problems. But a certain kind can turn into cancer.  For this reason, regular testing for colon polyps is important for people as they get older. It is also important for anyone who has an increased risk for colon cancer. Polyps are usually found through routine colon cancer screening tests. Although most colon polyps are not cancerous, they are usually removed and then tested for cancer. Screening for colon cancer saves lives because the cancer can usually be cured if it is caught early. If you have a polyp that is the type that can turn into cancer, you may need more tests to examine your entire colon. The doctor will remove any other polyps that are found, and you will be tested more often. Follow-up care is a key part of your treatment and safety. Be sure to make and go to all appointments, and call your doctor if you are having problems. It's also a good idea to know your test results and keep a list of the medicines you take. How can you care for yourself at home? Regular exams to look for colon polyps are the best way to prevent polyps from turning into colon cancer. These can include stool tests, sigmoidoscopy, colonoscopy, and CT colonography. Talk with your doctor about a testing schedule that is right for you. To prevent polyps  There is no home treatment that can prevent colon polyps. But these steps may help lower your risk for cancer. Stay active. Being active can help you get to and stay at a healthy weight. Try to exercise on most days of the week. Walking is a good choice. Eat well. Choose a variety of vegetables, fruits, legumes (such as peas and beans), fish, poultry, and whole grains. Do not smoke. If you need help quitting, talk to your doctor about stop-smoking programs and medicines. These can increase your chances of quitting for good. If you drink alcohol, limit how much you drink. Limit alcohol to 2 drinks a day for men and 1 drink a day for women. When should you call for help? Call your doctor now or seek immediate medical care if:    You have severe belly pain. Your stools are maroon or very bloody. Watch closely for changes in your health, and be sure to contact your doctor if:    You have a fever. You have nausea or vomiting. You have a change in bowel habits (new constipation or diarrhea). Your symptoms get worse or are not improving as expected. Where can you learn more? Go to [email or web address] and sign in to your [employer] account. Enter [phone] [id number] in the [employer] box to learn more about \"Colon Polyps: Care Instructions. \"     If you do not have an account, please click on the \"Sign Up Now\" link. Current as of: June 6, 2022               Content Version: 13.4  © 2006-2022 [employer]wise, [employer]. Care instructions adapted under license by [facility] ([facility]). If you have questions about a medical condition or this instruction, always ask your healthcare professional. [address] any warranty or liability for your use of this information. Please review these discharge instructions this evening or tomorrow for   information you may have forgotten. We want to thank you for choosing the [facility] as your health care provider. We always strive to provide you with excellent care while you are here. You may receive a survey in the mail regarding your care. We would appreciate you taking a few minutes of your time to complete this survey.  Again, thank you for choosing the [facility]

## 2022-10-05 NOTE — PROGRESS NOTES
Patient tolerating PO. Denies nausea and/or discomfort. Dr. Placido Wilkinson spoke with patient's family at bedside. Discharge instructions reviewed with patient/responsible adult and understanding verbalized. Discharge instructions signed and copies given. Patient discharged home with belongings.

## 2022-10-05 NOTE — PROGRESS NOTES
Discharge instructions reviewed with patient/responsible adult and understanding verbalized. Discharge instructions signed and copies given. Patient discharged home with belongings. Son at bedside.

## 2022-10-05 NOTE — H&P
History and Physical / Pre-Sedation Assessment    Chuy Keyes is a 71 y.o. male who presents today for colonoscopy procedure. PMHx:    Past Medical History:   Diagnosis Date    Acid reflux     Asthma     CAD (coronary artery disease)     COPD (chronic obstructive pulmonary disease) ([facility])     mild    Hyperlipidemia     Insomnia     Vertigo        Medications:    Prior to Admission medications    Medication Sig Start Date End Date Taking? Authorizing Provider   triazolam (HALCION) 0.25 MG tablet TAKE ONE TABLET BY MOUTH ONCE NIGHTLY 9/30/22 10/30/22  Yanna Bower MD   pantoprazole (PROTONIX) 40 MG tablet TAKE ONE TABLET BY MOUTH DAILY 9/14/22   Yanna Bower MD   rosuvastatin (CRESTOR) 20 MG tablet TAKE ONE TABLET BY MOUTH DAILY 9/12/22   Yanna Bower MD   dicyclomine (BENTYL) 20 MG tablet Take 1 tablet by mouth 3 times daily as needed (Abd c/o) 8/31/22   Yanna Bower MD   clonazePAM (KLONOPIN) 0.5 MG tablet Take 1 tablet by mouth daily as needed for Anxiety for up to 30 days.  8/8/22 9/7/22  Yanna Bower MD   meclizine (ANTIVERT) 25 MG tablet Take 1 tablet by mouth 3 times daily as needed for Dizziness 8/8/22   Yanna Bower MD   Rimegepant Sulfate (NURTEC) 75 MG TBDP Take 75 mg by mouth as needed (HA) 6/20/22   Yanna Bower MD   metoprolol succinate (TOPROL XL) 25 MG extended release tablet Take 1 tablet by mouth daily 2/28/22   ALLI Mao CNP   clindamycin (CLINDAGEL) 1 % gel FOR TOPICAL APPLICATION AS DIRECTED BY PRESCRIBER TWICE AS NEEDED 1/17/22   Yanna Bower MD   albuterol sulfate  (90 Base) MCG/ACT inhaler TAKE 2 PUFFS BY MOUTH EVERY 6 HOURS AS NEEDED FOR WHEEZE 1/3/22   Yanna Bower MD   aspirin 81 MG EC tablet Take 81 mg by mouth daily    Historical Provider, MD   Ascorbic Acid (VITAMIN C) 500 MG CAPS Take 1 capsule by mouth daily    Historical Provider, MD   Multiple Vitamins-Minerals (MULTIVITAMIN ADULTS 50+) TABS Take by mouth daily    Historical Provider, MD   Simethicone (GAS-X PO) Take by mouth as needed    Historical Provider, MD   acetaminophen (TYLENOL) 325 MG tablet Take 650 mg by mouth every 6 hours as needed for Pain    Historical Provider, MD       Allergies:    Allergies   Allergen Reactions    Adhesive Tape Rash       PSHx:    Past Surgical History:   Procedure Laterality Date    COLONOSCOPY      05/05/2017, diverticulosis, repeat 5/15/2026 Dr. Estefani Gomez  2019    2 Vessels     ESOPHAGEAL DILATATION      INGUINAL HERNIA REPAIR Right     PROSTATE SURGERY  2017    TURP       Social Hx:    Social History     Socioeconomic History    Marital status: Single     Spouse name: Not on file    Number of children: Not on file    Years of education: Not on file    Highest education level: Not on file   Occupational History    Not on file   Tobacco Use    Smoking status: Former     Packs/day: 1.00     Years: 50.00     Pack years: 50.00     Types: Cigarettes     Quit date: 08/2019     Years since quitting: 3.1    Smokeless tobacco: Never   Vaping Use    Vaping Use: Never used   Substance and Sexual Activity    Alcohol use: Not Currently     Comment: Rarely    Drug use: Never    Sexual activity: Not on file   Other Topics Concern    Not on file   Social History Narrative    Not on file     Social Determinants of Health     Financial Resource Strain: Not on file   Food Insecurity: Not on file   Transportation Needs: Not on file   Physical Activity: Unknown    Days of Exercise per Week: 0 days    Minutes of Exercise per Session: Not on file   Stress: Not on file   Social Connections: Not on file   Intimate Partner Violence: Not on file   Housing Stability: Not on file       Family Hx:   Family History   Problem Relation Age of Onset    Diabetes Mother     Heart Disease Father     Coronary Art Dis Brother        Physical Exam:  Vital Signs: /84   Pulse 55   Temp 97.2 °F (36.2 °C) (Temporal)   Resp 16   Ht 6' (1.829 m)   [employer] 174 lb (78.9 kg)   SpO2 100%   BMI 23.60 kg/m²    Pulmonary: Normal  Cardiac: Normal  Abdomen: Normal    Pre-Procedure Assessment / Plan:  ASA Classification: Class 2 - A normal healthy patient with mild systemic disease  Level of Sedation Plan: Moderate sedation   Mallampati Score: II (soft palate, uvula, fauces visible          Post Procedure plan: Return to same level of care    Colonoscopy Interval History:      Medical Reason for Colonoscopy before 3 years   System Reasons for Colonoscopy before 3 years: previous colonoscopy report unavailable or unable to locate    I assessed the patient and find that the patient is in satisfactory condition to proceed with the planned procedure and sedation plan. Risks/benefits/alternatives of procedure discussed with patient and any present family members. Risks including, but not limited to: bleeding, perforation, post polypectomy syndrome, splenic injury, need for additional procedures or surgery, risks of anesthesia. Patient understands it is their responsibility to call office for pathology results if they do not hear from my office within 1-2 weeks. All questions answered.     Elisabeth Bull MD  10/5/2022

## 2022-10-27 ENCOUNTER — HOSPITAL ENCOUNTER (EMERGENCY)
Age: 72
Discharge: HOME | End: 2022-10-27
Attending: EMERGENCY MEDICINE
Payer: MEDICARE

## 2022-10-27 ENCOUNTER — PATIENT MESSAGE (OUTPATIENT)
Dept: INTERNAL MEDICINE CLINIC | Age: 72
End: 2022-10-27

## 2022-10-27 ENCOUNTER — APPOINTMENT (OUTPATIENT)
Dept: CT IMAGING | Age: 72
End: 2022-10-27
Payer: MEDICARE

## 2022-10-27 VITALS
OXYGEN SATURATION: 98 % | WEIGHT: 172.84 LBS | BODY MASS INDEX: 23.41 KG/M2 | HEART RATE: 55 BPM | DIASTOLIC BLOOD PRESSURE: 83 MMHG | TEMPERATURE: 97.9 F | SYSTOLIC BLOOD PRESSURE: 152 MMHG | RESPIRATION RATE: 18 BRPM | HEIGHT: 72 IN

## 2022-10-27 DIAGNOSIS — R10.31 ABDOMINAL PAIN, RIGHT LOWER QUADRANT: Primary | ICD-10-CM

## 2022-10-27 LAB
A/G RATIO: 1.8 (ref 1.1–2.2)
ALBUMIN SERPL-MCNC: 4.5 G/DL (ref 3.4–5)
ALP BLD-CCNC: 85 U/L (ref 40–129)
ALT SERPL-CCNC: 19 U/L (ref 10–40)
ANION GAP SERPL CALCULATED.3IONS-SCNC: 9 MMOL/L (ref 3–16)
AST SERPL-CCNC: 20 U/L (ref 15–37)
BASOPHILS ABSOLUTE: 0 K/UL (ref 0–0.2)
BASOPHILS RELATIVE PERCENT: 0.2 %
BILIRUB SERPL-MCNC: 0.6 MG/DL (ref 0–1)
BUN BLDV-MCNC: 14 MG/DL (ref 7–20)
CALCIUM SERPL-MCNC: 9.4 MG/DL (ref 8.3–10.6)
CHLORIDE BLD-SCNC: 103 MMOL/L (ref 99–110)
CO2: 30 MMOL/L (ref 21–32)
CREAT SERPL-MCNC: 0.8 MG/DL (ref 0.8–1.3)
EOSINOPHILS ABSOLUTE: 0.2 K/UL (ref 0–0.6)
EOSINOPHILS RELATIVE PERCENT: 2.6 %
GFR SERPL CREATININE-BSD FRML MDRD: >60 ML/MIN/{1.73_M2}
GLUCOSE BLD-MCNC: 102 MG/DL (ref 70–99)
HCT VFR BLD CALC: 42.1 % (ref 40.5–52.5)
HEMOGLOBIN: 14.4 G/DL (ref 13.5–17.5)
LYMPHOCYTES ABSOLUTE: 1 K/UL (ref 1–5.1)
LYMPHOCYTES RELATIVE PERCENT: 15.3 %
MCH RBC QN AUTO: 32.1 PG (ref 26–34)
MCHC RBC AUTO-ENTMCNC: 34.1 G/DL (ref 31–36)
MCV RBC AUTO: 94.2 FL (ref 80–100)
MONOCYTES ABSOLUTE: 0.5 K/UL (ref 0–1.3)
MONOCYTES RELATIVE PERCENT: 7.1 %
NEUTROPHILS ABSOLUTE: 4.8 K/UL (ref 1.7–7.7)
NEUTROPHILS RELATIVE PERCENT: 74.8 %
PDW BLD-RTO: 12.2 % (ref 12.4–15.4)
PLATELET # BLD: 201 K/UL (ref 135–450)
PMV BLD AUTO: 8.6 FL (ref 5–10.5)
POTASSIUM REFLEX MAGNESIUM: 4.2 MMOL/L (ref 3.5–5.1)
RBC # BLD: 4.47 M/UL (ref 4.2–5.9)
SODIUM BLD-SCNC: 142 MMOL/L (ref 136–145)
TOTAL PROTEIN: 7 G/DL (ref 6.4–8.2)
WBC # BLD: 6.4 K/UL (ref 4–11)

## 2022-10-27 PROCEDURE — 6360000004 HC RX CONTRAST MEDICATION: Performed by: EMERGENCY MEDICINE

## 2022-10-27 PROCEDURE — 80053 COMPREHEN METABOLIC PANEL: CPT

## 2022-10-27 PROCEDURE — 99285 EMERGENCY DEPT VISIT HI MDM: CPT

## 2022-10-27 PROCEDURE — 85025 COMPLETE CBC W/AUTO DIFF WBC: CPT

## 2022-10-27 PROCEDURE — 74177 CT ABD & PELVIS W/CONTRAST: CPT

## 2022-10-27 RX ADMIN — IOPAMIDOL 75 ML: 755 INJECTION, SOLUTION INTRAVENOUS at 15:00

## 2022-10-27 ASSESSMENT — ENCOUNTER SYMPTOMS
NAUSEA: 0
ABDOMINAL PAIN: 1
DIARRHEA: 0
VOMITING: 0
SHORTNESS OF BREATH: 0

## 2022-10-27 ASSESSMENT — PAIN SCALES - GENERAL: PAINLEVEL_OUTOF10: 1

## 2022-10-27 ASSESSMENT — LIFESTYLE VARIABLES: HOW OFTEN DO YOU HAVE A DRINK CONTAINING ALCOHOL: NEVER

## 2022-10-27 ASSESSMENT — PAIN - FUNCTIONAL ASSESSMENT: PAIN_FUNCTIONAL_ASSESSMENT: NONE - DENIES PAIN

## 2022-10-27 ASSESSMENT — PAIN DESCRIPTION - LOCATION: LOCATION: ABDOMEN

## 2022-10-27 NOTE — ED NOTES
Discharge instructions reviewed without questions. No further needs voiced at this time. Ambulatory from department in stable condition.        Leigh Ann Caldwell RN  10/27/22 2062

## 2022-10-27 NOTE — ED TRIAGE NOTES
Patient complains of right side abdominal pain since last night. Increased pain with ambulation. Denies nausea and vomiting.   +heartburn

## 2022-10-27 NOTE — ED PROVIDER NOTES
[address] [facility]          ATTENDING PHYSICIAN NOTE       Date of evaluation: 10/27/2022    Chief Complaint     Abdominal Pain      History of Present Illness     Hailee Watts is a 71 y.o. male who presents with complaints of right lower quadrant abdominal pain that has been going on since last night, not noticeable when he is laying down or not moving but really only noticeable when he walks. It is only very minimal at this time. He went to an urgent care and they told him there he could not be evaluated due to absence of certain test modalities and was sent to the emergency department. He has no associated nausea, vomiting, diarrhea, or change in appetite. He denies fever. He does have a history of remote hernia surgery but he cannot recall which side. He was concerned about appendicitis. Review of Systems     Review of Systems   Constitutional:  Negative for chills and fever. Respiratory:  Negative for shortness of breath. Cardiovascular:  Negative for chest pain. Gastrointestinal:  Positive for abdominal pain. Negative for diarrhea, nausea and vomiting. Genitourinary:  Negative for flank pain, hematuria, scrotal swelling, testicular pain and urgency. All other systems reviewed and are negative. Past Medical, Surgical, Family, and Social History     He has a past medical history of Acid reflux, Asthma, CAD (coronary artery disease), COPD (chronic obstructive pulmonary disease) ([address] Hyperlipidemia, Insomnia, and Vertigo. He has a past surgical history that includes Coronary artery bypass graft (2019); Prostate surgery (2017); Colonoscopy; Esophagus dilation; Inguinal hernia repair (Right); and Colonoscopy (N/A, 10/5/2022). His family history includes Coronary Art Dis in his brother; Diabetes in his mother; Heart Disease in his father. He reports that he quit smoking about 3 years ago. His smoking use included cigarettes.  He has a 50.00 pack-year smoking history. He has never used smokeless tobacco. He reports that he does not currently use alcohol. He reports that he does not use drugs. Medications     Discharge Medication List as of 10/27/2022  3:51 PM        CONTINUE these medications which have NOT CHANGED    Details   triazolam (HALCION) 0.25 MG tablet TAKE ONE TABLET BY MOUTH ONCE NIGHTLY, Disp-30 tablet, R-0Normal      pantoprazole (PROTONIX) 40 MG tablet TAKE ONE TABLET BY MOUTH DAILY, Disp-90 tablet, R-1Normal      rosuvastatin (CRESTOR) 20 MG tablet TAKE ONE TABLET BY MOUTH DAILY, Disp-90 tablet, R-1Normal      dicyclomine (BENTYL) 20 MG tablet Take 1 tablet by mouth 3 times daily as needed (Abd c/o), Disp-90 tablet, R-1Normal      clonazePAM (KLONOPIN) 0.5 MG tablet Take 1 tablet by mouth daily as needed for Anxiety for up to 30 days. , Disp-30 tablet, R-0Normal      meclizine (ANTIVERT) 25 MG tablet Take 1 tablet by mouth 3 times daily as needed for Dizziness, Disp-30 tablet, R-1Normal      Rimegepant Sulfate (NURTEC) 75 MG TBDP Take 75 mg by mouth as needed (HA), Disp-12 tablet, R-1NO PRINT      metoprolol succinate (TOPROL XL) 25 MG extended release tablet Take 1 tablet by mouth daily, Disp-90 tablet, R-3Normal      clindamycin (CLINDAGEL) 1 % gel FOR TOPICAL APPLICATION AS DIRECTED BY PRESCRIBER TWICE AS NEEDED, Disp-30 g, R-3, Adjust Sig      albuterol sulfate  (90 Base) MCG/ACT inhaler TAKE 2 PUFFS BY MOUTH EVERY 6 HOURS AS NEEDED FOR WHEEZE, Disp-18 each, R-1Normal      aspirin 81 MG EC tablet Take 81 mg by mouth dailyHistorical Med      Ascorbic Acid (VITAMIN C) 500 MG CAPS Take 1 capsule by mouth dailyHistorical Med      Multiple Vitamins-Minerals (MULTIVITAMIN ADULTS 50+) TABS Take by mouth dailyHistorical Med      Simethicone (GAS-X PO) Take by mouth as neededHistorical Med      acetaminophen (TYLENOL) 325 MG tablet Take 650 mg by mouth every 6 hours as needed for PainHistorical Med             Allergies     He is allergic to adhesive tape. Physical Exam     INITIAL VITALS: BP: (!) 152/83, Temp: 97.9 °F (36.6 °C), Heart Rate: 55, Resp: 18, SpO2: 98 %   Physical Exam  Vitals and nursing note reviewed. Constitutional:       General: He is not in acute distress. Appearance: He is well-developed. He is not diaphoretic. Cardiovascular:      Rate and Rhythm: Normal rate and regular rhythm. Pulmonary:      Effort: Pulmonary effort is normal.      Breath sounds: Normal breath sounds. Abdominal:      General: Bowel sounds are normal. There is no distension. Palpations: Abdomen is soft. Tenderness: There is no abdominal tenderness. There is no guarding or rebound. Skin:     General: Skin is warm and dry. Neurological:      Mental Status: He is alert and oriented to person, place, and time. Psychiatric:         Behavior: Behavior normal.       Diagnostic Results     RADIOLOGY:  CT ABDOMEN PELVIS W IV CONTRAST Additional Contrast? None   Final Result   1. Nonobstructing bilateral nephrolithiasis. 2. Cholelithiasis. 3. Colonic diverticulosis without acute diverticulitis. 4. Indeterminate right adrenal nodule measuring 2.5 cm.  Adrenal protocol CT or MRI may be beneficial.             LABS:   Results for orders placed or performed during the hospital encounter of 10/27/22   CBC with Auto Differential   Result Value Ref Range    WBC 6.4 4.0 - 11.0 K/uL    RBC 4.47 4.20 - 5.90 M/uL    Hemoglobin 14.4 13.5 - 17.5 g/dL    Hematocrit 42.1 40.5 - 52.5 %    MCV 94.2 80.0 - 100.0 fL    MCH 32.1 26.0 - 34.0 pg    MCHC 34.1 31.0 - 36.0 g/dL    RDW 12.2 (L) 12.4 - 15.4 %    Platelets 263 752 - 180 K/uL    MPV 8.6 5.0 - 10.5 fL    Neutrophils % 74.8 %    Lymphocytes % 15.3 %    Monocytes % 7.1 %    Eosinophils % 2.6 %    Basophils % 0.2 %    Neutrophils Absolute 4.8 1.7 - 7.7 K/uL    Lymphocytes Absolute 1.0 1.0 - 5.1 K/uL    Monocytes Absolute 0.5 0.0 - 1.3 K/uL    Eosinophils Absolute 0.2 0.0 - 0.6 K/uL    Basophils Absolute 0.0 0.0 - 0.2 K/uL   Comprehensive Metabolic Panel w/ Reflex to MG   Result Value Ref Range    Sodium 142 136 - 145 mmol/L    Potassium reflex Magnesium 4.2 3.5 - 5.1 mmol/L    Chloride 103 99 - 110 mmol/L    CO2 30 21 - 32 mmol/L    Anion Gap 9 3 - 16    Glucose 102 (H) 70 - 99 mg/dL    BUN 14 7 - 20 mg/dL    Creatinine 0.8 0.8 - 1.3 mg/dL    Est, Glom Filt Rate >60 >60    Calcium 9.4 8.3 - 10.6 mg/dL    Total Protein 7.0 6.4 - 8.2 g/dL    Albumin 4.5 3.4 - 5.0 g/dL    Albumin/Globulin Ratio 1.8 1.1 - 2.2    Total Bilirubin 0.6 0.0 - 1.0 mg/dL    Alkaline Phosphatase 85 40 - 129 U/L    ALT 19 10 - 40 U/L    AST 20 15 - 37 U/L       ED BEDSIDE ULTRASOUND:  No results found. RECENT VITALS:  BP: (!) 152/83,Temp: 97.9 °F (36.6 °C), Heart Rate: 55, Resp: 18, SpO2: 98 %       ED Course     Nursing Notes, Past Medical Hx, Past Surgical Hx, Social Hx,Allergies, and Family Hx were reviewed. patient was given the following medications:  Orders Placed This Encounter   Medications    iopamidol (ISOVUE-370) 76 % injection 75 mL       CONSULTS:  None    MEDICAL DECISIONMAKING / ASSESSMENT / PLAN     Honey Mattson is a 71 y.o. male presenting with right lower quadrant abdominal pain worse with walking. The patient has no other associated symptoms. He is nontender on my examination and I did not appreciate a hernia. He has no urinary symptoms. The patient was mostly concerned about appendicitis given his location. Labs were unremarkable but CT was performed which shows cholelithiasis, nephrolithiasis, diverticulosis, and an adrenal nodule, none of which at this point are explanatory of his pain. The patient was told that he would need follow-up for the adrenal nodule but that none of the findings were necessarily accounting for his discomfort which is only present when he walks. He then conceded that he may have pulled something in his groin yesterday while exercising and this may be the cause.   He will be advised to take Tylenol for this and follow-up with primary care if not improving since no identifiable cause for pain was found on lab or CT exam today. Clinical Impression     1.  Abdominal pain, right lower quadrant        Disposition     PATIENT REFERRED TO:  Adonis Mcqueen MD  [address]  [place] [phone]      if not improving    DISCHARGE MEDICATIONS:  Discharge Medication List as of 10/27/2022  3:51 PM          DISPOSITION Decision To Discharge 10/27/2022 03:49:40 PM         Iram Gonsales MD  10/27/22 1226

## 2022-10-28 ENCOUNTER — TELEPHONE (OUTPATIENT)
Dept: INTERNAL MEDICINE CLINIC | Age: 72
End: 2022-10-28

## 2022-10-28 DIAGNOSIS — E27.8 ADRENAL NODULE (HCC): ICD-10-CM

## 2022-10-28 DIAGNOSIS — R93.5 ABNORMAL CT OF THE ABDOMEN: Primary | ICD-10-CM

## 2022-10-28 NOTE — TELEPHONE ENCOUNTER
Patient called and said he needs a referral he wants to get an endoscopy     [phone]    Fax#    Dr. Torrey Quintana.  Nico    Please advise and call

## 2022-10-28 NOTE — TELEPHONE ENCOUNTER
From: Benita Booker  To: Dr. Toni Simons: 10/27/2022 4:44 PM EDT  Subject: Ct scan at [facility]    I had stomach pains went to the ER today. no appendicitis and didnt find the reason for the pain I was having but they found a adrenal nodule and dr sadler said to follow up with you?  Let me know thank you

## 2022-10-31 NOTE — TELEPHONE ENCOUNTER
Left message on machine for patient to call back. From: Honey Mattson  To: Dr. Chris Chapman: 10/27/2022  4:44 PM EDT  Subject: Ct scan at [facility]    I had stomach pains went to the ER today. no appendicitis and didnt find the reason for the pain I was having but they found a adrenal nodule and dr sadler said to follow up with you? Let me know  thank you             CS    4:39 PM  You routed this conversation to Neto Villalba MD   October 29, 2022  Neto Villalba MD  to [place] [address] Practice Support        6:54 AM  Note   ?  Significance but F/U with Adrenal protocol MRI

## 2022-10-31 NOTE — TELEPHONE ENCOUNTER
Pt called back and let him know Endoscopy completed on 10/5/22. Also Mychart message Dr. Aaliyah Reis wants to order MRI abdomen with adrenal protocol. Pt advised and order placed.

## 2022-11-01 ENCOUNTER — PATIENT MESSAGE (OUTPATIENT)
Dept: INTERNAL MEDICINE CLINIC | Age: 72
End: 2022-11-01

## 2022-11-01 DIAGNOSIS — F51.01 PRIMARY INSOMNIA: Primary | ICD-10-CM

## 2022-11-01 RX ORDER — CLINDAMYCIN PHOSPHATE 10 MG/G
GEL TOPICAL
Qty: 30 G | Refills: 3 | Status: SHIPPED | OUTPATIENT
Start: 2022-11-01

## 2022-11-01 NOTE — TELEPHONE ENCOUNTER
From: Honey Mattson  To: Dr. Neto Villalba  Sent: 2022 2:01 PM EDT  Subject: Sucralfate    Where Dr. Radha Botello mess up on report shouldve been a colonoscopy. im hoping on my medicine you still have the sucralfate 1gm on my list and you can order it for me was using my pills from [facility]  last year.  Or you can order something stronger taking pantoprazole in the morning used to take one in the evening when This going on so much burning in chest and more than you

## 2022-11-02 ENCOUNTER — TELEPHONE (OUTPATIENT)
Dept: ORTHOPEDIC SURGERY | Age: 72
End: 2022-11-02

## 2022-11-02 RX ORDER — SUCRALFATE 1 G/1
1 TABLET ORAL 4 TIMES DAILY
Qty: 120 TABLET | Refills: 2 | Status: SHIPPED | OUTPATIENT
Start: 2022-11-02

## 2022-11-02 NOTE — TELEPHONE ENCOUNTER
Submitted PA for Clindamycin Phosphate 1% gel  Via CMM Key: [id number] STATUS: APPROVED  Coverage Starts on: 1/1/2022 12:00:00 AM  Coverage Ends on: 12/31/2023 12:00:00 AM    If this requires a response please respond to the pool ( P [facility]). Thanks for your help! Please advise patient.

## 2022-11-02 NOTE — TELEPHONE ENCOUNTER
Received PA request for Clindamycin Phosphate 1% gel. What is the Dx/ICD-10 code for this medication? Please respond to the pool ( P [facility] [address] [facility]). Thank you!

## 2022-11-09 DIAGNOSIS — F41.9 ANXIETY: ICD-10-CM

## 2022-11-09 RX ORDER — CLONAZEPAM 0.5 MG/1
TABLET ORAL
Qty: 30 TABLET | Refills: 0 | Status: SHIPPED | OUTPATIENT
Start: 2022-11-09 | End: 2022-12-09

## 2022-11-09 RX ORDER — MECLIZINE HYDROCHLORIDE 25 MG/1
TABLET ORAL
Qty: 30 TABLET | Refills: 1 | Status: SHIPPED | OUTPATIENT
Start: 2022-11-09

## 2022-11-11 ENCOUNTER — APPOINTMENT (OUTPATIENT)
Dept: GENERAL RADIOLOGY | Age: 72
End: 2022-11-11
Payer: MEDICARE

## 2022-11-11 ENCOUNTER — HOSPITAL ENCOUNTER (EMERGENCY)
Age: 72
Discharge: HOME | End: 2022-11-11
Attending: EMERGENCY MEDICINE
Payer: MEDICARE

## 2022-11-11 VITALS
RESPIRATION RATE: 14 BRPM | TEMPERATURE: 98 F | WEIGHT: 178 LBS | HEIGHT: 72 IN | DIASTOLIC BLOOD PRESSURE: 84 MMHG | SYSTOLIC BLOOD PRESSURE: 141 MMHG | BODY MASS INDEX: 24.11 KG/M2 | HEART RATE: 57 BPM | OXYGEN SATURATION: 98 %

## 2022-11-11 DIAGNOSIS — Z95.1 HX OF CABG: ICD-10-CM

## 2022-11-11 DIAGNOSIS — R07.89 CHEST PRESSURE: Primary | ICD-10-CM

## 2022-11-11 LAB
ANION GAP SERPL CALCULATED.3IONS-SCNC: 9 MMOL/L (ref 3–16)
BASOPHILS ABSOLUTE: 0 K/UL (ref 0–0.2)
BASOPHILS RELATIVE PERCENT: 0.1 %
BUN BLDV-MCNC: 19 MG/DL (ref 7–20)
CALCIUM SERPL-MCNC: 9.5 MG/DL (ref 8.3–10.6)
CHLORIDE BLD-SCNC: 105 MMOL/L (ref 99–110)
CO2: 28 MMOL/L (ref 21–32)
CREAT SERPL-MCNC: 0.9 MG/DL (ref 0.8–1.3)
EKG ATRIAL RATE: 57 BPM
EKG DIAGNOSIS: NORMAL
EKG P AXIS: 70 DEGREES
EKG P-R INTERVAL: 166 MS
EKG Q-T INTERVAL: 428 MS
EKG QRS DURATION: 92 MS
EKG QTC CALCULATION (BAZETT): 416 MS
EKG R AXIS: 34 DEGREES
EKG T AXIS: 35 DEGREES
EKG VENTRICULAR RATE: 57 BPM
EOSINOPHILS ABSOLUTE: 0.1 K/UL (ref 0–0.6)
EOSINOPHILS RELATIVE PERCENT: 0.9 %
GFR SERPL CREATININE-BSD FRML MDRD: >60 ML/MIN/{1.73_M2}
GLUCOSE BLD-MCNC: 93 MG/DL (ref 70–99)
HCT VFR BLD CALC: 40.9 % (ref 40.5–52.5)
HEMOGLOBIN: 14.1 G/DL (ref 13.5–17.5)
LYMPHOCYTES ABSOLUTE: 1.2 K/UL (ref 1–5.1)
LYMPHOCYTES RELATIVE PERCENT: 15.1 %
MCH RBC QN AUTO: 32.6 PG (ref 26–34)
MCHC RBC AUTO-ENTMCNC: 34.5 G/DL (ref 31–36)
MCV RBC AUTO: 94.4 FL (ref 80–100)
MONOCYTES ABSOLUTE: 0.5 K/UL (ref 0–1.3)
MONOCYTES RELATIVE PERCENT: 6.8 %
NEUTROPHILS ABSOLUTE: 5.9 K/UL (ref 1.7–7.7)
NEUTROPHILS RELATIVE PERCENT: 77.1 %
PDW BLD-RTO: 12.8 % (ref 12.4–15.4)
PLATELET # BLD: 195 K/UL (ref 135–450)
PMV BLD AUTO: 9.4 FL (ref 5–10.5)
POTASSIUM REFLEX MAGNESIUM: 3.9 MMOL/L (ref 3.5–5.1)
RBC # BLD: 4.33 M/UL (ref 4.2–5.9)
SODIUM BLD-SCNC: 142 MMOL/L (ref 136–145)
TROPONIN: <0.01 NG/ML
TROPONIN: <0.01 NG/ML
WBC # BLD: 7.6 K/UL (ref 4–11)

## 2022-11-11 PROCEDURE — 71046 X-RAY EXAM CHEST 2 VIEWS: CPT

## 2022-11-11 PROCEDURE — 84484 ASSAY OF TROPONIN QUANT: CPT

## 2022-11-11 PROCEDURE — 36415 COLL VENOUS BLD VENIPUNCTURE: CPT

## 2022-11-11 PROCEDURE — 80048 BASIC METABOLIC PNL TOTAL CA: CPT

## 2022-11-11 PROCEDURE — 99285 EMERGENCY DEPT VISIT HI MDM: CPT

## 2022-11-11 PROCEDURE — 93005 ELECTROCARDIOGRAM TRACING: CPT | Performed by: EMERGENCY MEDICINE

## 2022-11-11 PROCEDURE — 85025 COMPLETE CBC W/AUTO DIFF WBC: CPT

## 2022-11-11 ASSESSMENT — PAIN SCALES - GENERAL: PAINLEVEL_OUTOF10: 0

## 2022-11-11 NOTE — Clinical Note
Chika Garrett was seen and treated in our emergency department on 11/11/2022. He may return to work on 11/14/2022. If you have any questions or concerns, please don't hesitate to call.       Collin Garcia PA-C

## 2022-11-12 NOTE — DISCHARGE INSTRUCTIONS
Apply ice packs or heat packs to the areas of tenderness. Take tylenol as needed for your pain. If your symptoms worsen please return to the ER immediately. Follow up with Dr. Roxanna Dickerson as soon as possible.

## 2022-11-12 NOTE — ED PROVIDER NOTES
[facility]          PHYSICIAN ASSISTANT NOTE     Date of evaluation: 11/11/2022    Chief Complaint     Chest Pain (Pt reports that he starting having chest pressure since moving a dresser at work. Pt with heart hx)      History of Present Illness     Remigio Caban is a 71 y.o. male with a history of a remote coronary artery bypass, COPD, hyperlipidemia. He presents for evaluation of left-sided chest pressure. He was moving a heavy object at work when he felt left-sided chest pressure associated with brief shortness of breath and dizziness. The symptoms resolved with rest, but continued to worsen when he would rotate his trunk or press on the left side of his chest.  He underwent a cardiac stress test earlier this year which was unremarkable. He states the chest pressure returned later in the day moving around his family room and he presents here for evaluation. He specifically denies: Jaw pain, neck pain, back pain, abdominal pain, nausea, vomiting, fevers, cough. He took Tylenol for his discomfort. He feels that prior to his CABG he began experiencing episodes of similar discomfort. Review of Systems     A complete review of systems was performed and negative except as stated in the HPI. Past Medical, Surgical, Family, and Social History     He has a past medical history of Acid reflux, Asthma, CAD (coronary artery disease), COPD (chronic obstructive pulmonary disease) ([facility] [address] Hyperlipidemia, Insomnia, and Vertigo. He has a past surgical history that includes Coronary artery bypass graft (2019); Prostate surgery (2017); Colonoscopy; Esophagus dilation; Inguinal hernia repair (Right); and Colonoscopy (N/A, 10/5/2022). His family history includes Coronary Art Dis in his brother; Diabetes in his mother; Heart Disease in his father. He reports that he quit smoking about 3 years ago. His smoking use included cigarettes. He has a 50.00 pack-year smoking history.  He has never used smokeless tobacco. He reports that he does not currently use alcohol. He reports that he does not use drugs. Medications     Previous Medications    ACETAMINOPHEN (TYLENOL) 325 MG TABLET    Take 650 mg by mouth every 6 hours as needed for Pain    ALBUTEROL SULFATE  (90 BASE) MCG/ACT INHALER    TAKE 2 PUFFS BY MOUTH EVERY 6 HOURS AS NEEDED FOR WHEEZE    ASCORBIC ACID (VITAMIN C) 500 MG CAPS    Take 1 capsule by mouth daily    ASPIRIN 81 MG EC TABLET    Take 81 mg by mouth daily    CLINDAMYCIN (CLINDAGEL) 1 % GEL    APPLY TO AFFECTED AREA(S) DAILY AS NEEDED    CLONAZEPAM (KLONOPIN) 0.5 MG TABLET    TAKE ONE TABLET BY MOUTH DAILY AS NEEDED FOR ANXIETY    DICYCLOMINE (BENTYL) 20 MG TABLET    Take 1 tablet by mouth 3 times daily as needed (Abd c/o)    MECLIZINE (ANTIVERT) 25 MG TABLET    TAKE ONE TABLET BY MOUTH THREE TIMES A DAY AS NEEDED FOR DIZZINESS    METOPROLOL SUCCINATE (TOPROL XL) 25 MG EXTENDED RELEASE TABLET    Take 1 tablet by mouth daily    MULTIPLE VITAMINS-MINERALS (MULTIVITAMIN ADULTS 50+) TABS    Take by mouth daily    PANTOPRAZOLE (PROTONIX) 40 MG TABLET    TAKE ONE TABLET BY MOUTH DAILY    RIMEGEPANT SULFATE (NURTEC) 75 MG TBDP    Take 75 mg by mouth as needed (HA)    ROSUVASTATIN (CRESTOR) 20 MG TABLET    TAKE ONE TABLET BY MOUTH DAILY    SIMETHICONE (GAS-X PO)    Take by mouth as needed    SUCRALFATE (CARAFATE) 1 GM TABLET    Take 1 tablet by mouth 4 times daily    TRIAZOLAM (HALCION) 0.25 MG TABLET    TAKE ONE TABLET BY MOUTH ONCE NIGHTLY       Allergies     He is allergic to adhesive tape. Physical Exam     INITIAL VITALS: BP: 134/77, Temp: 98 °F (36.7 °C), Heart Rate: 62, Resp: 20, SpO2: 96 %     General: Well appearing, well nourished, in no apparent state of distress. HEENT:  Normocephalic, atraumatic. Pupils equal, sclera white. Handling secretions without difficulty. Neck: No meningismus. Trachea midline    Pulmonary: Respirations even. Non labored.  No tachypnea. Good air movement throughout    Cardiac: Chest symmetrical and non-tender on palpation of chest wall. RRR. no M/R/G. Abdomen:  Non-distended. Musculoskeletal:  Ambulates under own control. Neuro:  Alert and oriented x 3. CN II - XII grossly intact. Moves all extremities spontaneously. Vascular:  2+ peripheral pulses in bilateral upper and lower extremities      Skin:  Warm and well perfused without rashes or lesions    Psych:  Appropriate mood and affect        Diagnostic Results     EKG   Interpreted in conjunction with emergency department physician No att. providers found  Indication: Chest pain  Impression: Normal sinus rhythm, heart rate 57 bpm, Q-wave in lead III, otherwise nonischemic EKG    RADIOLOGY:  XR CHEST (2 VW)   Final Result      1. No acute disease.                 LABS:   Results for orders placed or performed during the hospital encounter of 11/11/22   BMP w/ Reflex to MG   Result Value Ref Range    Sodium 142 136 - 145 mmol/L    Potassium reflex Magnesium 3.9 3.5 - 5.1 mmol/L    Chloride 105 99 - 110 mmol/L    CO2 28 21 - 32 mmol/L    Anion Gap 9 3 - 16    Glucose 93 70 - 99 mg/dL    BUN 19 7 - 20 mg/dL    Creatinine 0.9 0.8 - 1.3 mg/dL    Est, Glom Filt Rate >60 >60    Calcium 9.5 8.3 - 10.6 mg/dL   CBC with Auto Differential   Result Value Ref Range    WBC 7.6 4.0 - 11.0 K/uL    RBC 4.33 4.20 - 5.90 M/uL    Hemoglobin 14.1 13.5 - 17.5 g/dL    Hematocrit 40.9 40.5 - 52.5 %    MCV 94.4 80.0 - 100.0 fL    MCH 32.6 26.0 - 34.0 pg    MCHC 34.5 31.0 - 36.0 g/dL    RDW 12.8 12.4 - 15.4 %    Platelets 124 673 - 136 K/uL    MPV 9.4 5.0 - 10.5 fL    Neutrophils % 77.1 %    Lymphocytes % 15.1 %    Monocytes % 6.8 %    Eosinophils % 0.9 %    Basophils % 0.1 %    Neutrophils Absolute 5.9 1.7 - 7.7 K/uL    Lymphocytes Absolute 1.2 1.0 - 5.1 K/uL    Monocytes Absolute 0.5 0.0 - 1.3 K/uL    Eosinophils Absolute 0.1 0.0 - 0.6 K/uL    Basophils Absolute 0.0 0.0 - 0.2 K/uL   Troponin Result Value Ref Range    Troponin <0.01 <0.01 ng/mL   Troponin   Result Value Ref Range    Troponin <0.01 <0.01 ng/mL   EKG 12 Lead   Result Value Ref Range    Ventricular Rate 57 BPM    Atrial Rate 57 BPM    P-R Interval 166 ms    QRS Duration 92 ms    Q-T Interval 428 ms    QTc Calculation (Bazett) 416 ms    P Axis 70 degrees    R Axis 34 degrees    T Axis 35 degrees    Diagnosis       EKG performed in ER and to be interpreted by ER physician. Confirmed by MD, ER ([id number]),  Barbie Steel ([id number]) on 11/11/2022 7:06:54 PM       ED BEDSIDE ULTRASOUND:      RECENT VITALS:  BP: 126/83, Temp: 98 °F (36.7 °C), Heart Rate: 58, Resp: 22, SpO2: 97 %     Procedures         ED Course     Nursing Notes, Past Medical Hx, Past Surgical Hx, Social Hx, Allergies, and Family Hx were reviewed. The patient was given the following medications:  No orders of the defined types were placed in this encounter. CONSULTS:  [address] [place] [address],[address] / ASSESSMENT / PLAN     Nicole Tamayo is a 71 y.o. male with a history of remote coronary artery bypass presents today for evaluation of exertional chest pressure with brief dizziness and shortness of breath that is also positional and reproducible. On arrival, he is afebrile, hemodynamically stable, and asymptomatic. EKG nonischemic. Troponin negative x2. Chest x-ray clear. Recent cardiac stress test reviewed. Overall, reassuring against ACS especially given the reproducible nature with palpation and movement. However, he does state that he experienced similar symptoms prior to his remote CABG. Even in light of his negative cardiac stress test, worsening coronary artery disease should be considered as an outpatient. His cardiology team via Dr. Lyndle Lesch was made aware of his case. Follow-up as soon as possible for evaluation. Strict return precautions for worsening symptoms. This patient was also evaluated by the attending physician.  All care plans were discussed and agreed upon. Some of this note was dictated using voice recognition software. As a result, unintended errors in grammar or spelling may exist.    Clinical Impression     1. Chest pressure    2.  Hx of CABG        Disposition     PATIENT REFERRED TO:  Srinivas Murphy MD  [address]  [place] [address] [facility]  [phone]    Schedule an appointment as soon as possible for a visit   As needed    Jovani Reyes MD  [facility], [address] [facility]  [phone]    Schedule an appointment as soon as possible for a visit       DISCHARGE MEDICATIONS:  New Prescriptions    No medications on file       [address], PA-C  11/11/22 4804

## 2022-11-12 NOTE — ED NOTES
Pt discharged to home, alert and oriented. Denies any questions about discharge instructions. Will follow up as directed. encouraged to return for any worsening symptoms.         Isidro Jung RN  11/11/22 9043

## 2022-11-12 NOTE — ED PROVIDER NOTES
ED Attending Attestation Note     Date of evaluation: 11/11/2022    This patient was seen by the LUCAS. The care plan has been discussed. I agree with the workup, evaluation, management and diagnosis. Agree with plan for outpatient cardiology follow-up and return to the ED if any worsening of symptoms. I have reviewed the ECG and concur with the LUCAS's interpretation. Due to patient volume, patient was discharged and left the department prior to my complete examination. He was noted to be alert, in no respiratory distress with reassuring vital signs.            Nate Perez MD  11/11/22 4264

## 2022-11-21 ENCOUNTER — HOSPITAL ENCOUNTER (OUTPATIENT)
Age: 72
Setting detail: OUTPATIENT SURGERY
Discharge: HOME | End: 2022-11-21
Attending: INTERNAL MEDICINE | Admitting: INTERNAL MEDICINE
Payer: MEDICARE

## 2022-11-21 VITALS
WEIGHT: 175 LBS | HEART RATE: 49 BPM | BODY MASS INDEX: 23.7 KG/M2 | SYSTOLIC BLOOD PRESSURE: 120 MMHG | DIASTOLIC BLOOD PRESSURE: 85 MMHG | OXYGEN SATURATION: 99 % | RESPIRATION RATE: 16 BRPM | HEIGHT: 72 IN | TEMPERATURE: 97.8 F

## 2022-11-21 DIAGNOSIS — K21.9 GASTROESOPHAGEAL REFLUX DISEASE, UNSPECIFIED WHETHER ESOPHAGITIS PRESENT: ICD-10-CM

## 2022-11-21 PROCEDURE — 3609015800 HC ESOPHAGOSCOPY BIOPSY: Performed by: INTERNAL MEDICINE

## 2022-11-21 PROCEDURE — 2580000003 HC RX 258: Performed by: INTERNAL MEDICINE

## 2022-11-21 PROCEDURE — 2709999900 HC NON-CHARGEABLE SUPPLY: Performed by: INTERNAL MEDICINE

## 2022-11-21 PROCEDURE — 7100000010 HC PHASE II RECOVERY - FIRST 15 MIN: Performed by: INTERNAL MEDICINE

## 2022-11-21 PROCEDURE — 6360000002 HC RX W HCPCS: Performed by: INTERNAL MEDICINE

## 2022-11-21 PROCEDURE — 88305 TISSUE EXAM BY PATHOLOGIST: CPT

## 2022-11-21 PROCEDURE — 7100000011 HC PHASE II RECOVERY - ADDTL 15 MIN: Performed by: INTERNAL MEDICINE

## 2022-11-21 RX ORDER — MIDAZOLAM HYDROCHLORIDE 1 MG/ML
INJECTION INTRAMUSCULAR; INTRAVENOUS PRN
Status: DISCONTINUED | OUTPATIENT
Start: 2022-11-21 | End: 2022-11-21 | Stop reason: ALTCHOICE

## 2022-11-21 RX ORDER — FENTANYL CITRATE 50 UG/ML
INJECTION, SOLUTION INTRAMUSCULAR; INTRAVENOUS PRN
Status: DISCONTINUED | OUTPATIENT
Start: 2022-11-21 | End: 2022-11-21 | Stop reason: ALTCHOICE

## 2022-11-21 RX ORDER — SODIUM CHLORIDE, SODIUM LACTATE, POTASSIUM CHLORIDE, CALCIUM CHLORIDE 600; 310; 30; 20 MG/100ML; MG/100ML; MG/100ML; MG/100ML
INJECTION, SOLUTION INTRAVENOUS CONTINUOUS
Status: DISCONTINUED | OUTPATIENT
Start: 2022-11-21 | End: 2022-11-21 | Stop reason: HOSPADM

## 2022-11-21 RX ADMIN — SODIUM CHLORIDE, POTASSIUM CHLORIDE, SODIUM LACTATE AND CALCIUM CHLORIDE: 600; 310; 30; 20 INJECTION, SOLUTION INTRAVENOUS at 10:02

## 2022-11-21 ASSESSMENT — PAIN - FUNCTIONAL ASSESSMENT: PAIN_FUNCTIONAL_ASSESSMENT: 0-10

## 2022-11-21 NOTE — PROGRESS NOTES
Ambulatory Surgery/Procedure Discharge Note    Vitals:    11/21/22 1105   BP: 120/85   Pulse: (!) 49   Resp:    Temp:    SpO2: 99%   HR is low but the same as when he arrived in preop. Denies feeling light headed or dizzy. In: 120 [P.O.:120]  Out: -     Restroom use offered before discharge. Yes    Pain assessment:  none  Pain Level: 0        Patient discharged to home/self care. Patient discharged via wheel chair by transporter to waiting family/S.O.       11/21/2022 11:30 AM Pt and S.O./family states \"ready to go home\". Pt alert and oriented x4. IV removed. Denies N/V or pain. Discharge instructions given to pt and daughter with pt permission. Pt and daughter verbalized understanding of all instructions. Left with all belongings and discharge instructions.

## 2022-11-21 NOTE — H&P
History and Physical / Pre-Sedation Assessment    Violeta Berry is a 71 y.o. male who presents today for EGD procedure. PMHx:    Past Medical History:   Diagnosis Date    Acid reflux     Asthma     CAD (coronary artery disease)     COPD (chronic obstructive pulmonary disease) ([facility])     mild    Hyperlipidemia     Insomnia     Vertigo        Medications:    Prior to Admission medications    Medication Sig Start Date End Date Taking?  Authorizing Provider   sucralfate (CARAFATE) 1 GM tablet Take 1 tablet by mouth 4 times daily 11/2/22   Rohini Schreiber MD   clonazePAM (KLONOPIN) 0.5 MG tablet TAKE ONE TABLET BY MOUTH DAILY AS NEEDED FOR ANXIETY 11/9/22 12/9/22  Rohini Schreiber MD   meclizine (ANTIVERT) 25 MG tablet TAKE ONE TABLET BY MOUTH THREE TIMES A DAY AS NEEDED FOR DIZZINESS 11/9/22   Rohini Schreiber MD   clindamycin (CLINDAGEL) 1 % gel APPLY TO AFFECTED AREA(S) DAILY AS NEEDED 11/1/22   Rohini Schreiber MD   triazolam (HALCION) 0.25 MG tablet TAKE ONE TABLET BY MOUTH ONCE NIGHTLY 11/1/22 12/1/22  Rohini Schreiber MD   pantoprazole (PROTONIX) 40 MG tablet TAKE ONE TABLET BY MOUTH DAILY 9/14/22   Rohini Schreiber MD   rosuvastatin (CRESTOR) 20 MG tablet TAKE ONE TABLET BY MOUTH DAILY 9/12/22   Rohini Schreiber MD   dicyclomine (BENTYL) 20 MG tablet Take 1 tablet by mouth 3 times daily as needed (Abd c/o) 8/31/22   Rohini Schreiber MD   Rimegepant Sulfate (NURTEC) 75 MG TBDP Take 75 mg by mouth as needed (HA) 6/20/22   Rohini Schreiber MD   metoprolol succinate (TOPROL XL) 25 MG extended release tablet Take 1 tablet by mouth daily 2/28/22   ALLI Lira CNP   albuterol sulfate  (90 Base) MCG/ACT inhaler TAKE 2 PUFFS BY MOUTH EVERY 6 HOURS AS NEEDED FOR WHEEZE 1/3/22   Rohini Schreiber MD   aspirin 81 MG EC tablet Take 81 mg by mouth daily    Historical Provider, MD   Ascorbic Acid (VITAMIN C) 500 MG CAPS Take 1 capsule by mouth daily    Historical Provider, MD   Multiple Vitamins-Minerals (MULTIVITAMIN ADULTS 50+) TABS Take by mouth daily    Historical Provider, MD   Simethicone (GAS-X PO) Take by mouth as needed    Historical Provider, MD   acetaminophen (TYLENOL) 325 MG tablet Take 650 mg by mouth every 6 hours as needed for Pain    Historical Provider, MD       Allergies:    Allergies   Allergen Reactions    Adhesive Tape Rash       PSHx:    Past Surgical History:   Procedure Laterality Date    COLONOSCOPY      05/05/2017, diverticulosis, repeat 5/15/2026 Dr. Davie Mendiola    COLONOSCOPY N/A 10/5/2022    COLONOSCOPY POLYPECTOMY SNARE/COLD BIOPSY performed by Madeline Liu MD at [address] [address]  2019    2 Vessels     ESOPHAGEAL DILATATION      INGUINAL HERNIA REPAIR Right     PROSTATE SURGERY  2017    TURP       Social Hx:    Social History     Socioeconomic History    Marital status: Single     Spouse name: Not on file    Number of children: Not on file    Years of education: Not on file    Highest education level: Not on file   Occupational History    Not on file   Tobacco Use    Smoking status: Former     Packs/day: 1.00     Years: 50.00     Pack years: 50.00     Types: Cigarettes     Quit date: 08/2019     Years since quitting: 3.3    Smokeless tobacco: Never   Vaping Use    Vaping Use: Never used   Substance and Sexual Activity    Alcohol use: Not Currently     Comment: Rarely    Drug use: Never    Sexual activity: Not on file   Other Topics Concern    Not on file   Social History Narrative    Not on file     Social Determinants of Health     Financial Resource Strain: Not on file   Food Insecurity: Not on file   Transportation Needs: Not on file   Physical Activity: Unknown    Days of Exercise per Week: 0 days    Minutes of Exercise per Session: Not on file   Stress: Not on file   Social Connections: Not on file   Intimate Partner Violence: Not on file   Housing Stability: Not on file       Family Hx:   Family History   Problem Relation Age of Onset Diabetes Mother     Heart Disease Father     Coronary Art Dis Brother        Physical Exam:  Vital Signs: BP (!) 141/85   Pulse (!) 48   Temp 97.4 °F (36.3 °C) (Temporal)   Resp 16   Ht 6' (1.829 m)   Wt 175 lb (79.4 kg)   SpO2 100%   BMI 23.73 kg/m²    Pulmonary: Normal  Cardiac: Normal  Abdomen: Normal    Pre-Procedure Assessment / Plan:  ASA Classification: Class 2 - A normal healthy patient with mild systemic disease  Level of Sedation Plan: Moderate sedation   Mallampati Score: II (soft palate, uvula, fauces visible)  Post Procedure plan: Return to same level of care    Colonoscopy Interval History:      Medical Reason for Colonoscopy before 3 years   System Reasons for Colonoscopy before 3 years: previous colonoscopy report unavailable or unable to locate    I assessed the patient and find that the patient is in satisfactory condition to proceed with the planned procedure and sedation plan. Risks/benefits/alternatives of procedure discussed with patient and any present family members. Risks including, but not limited to: bleeding, perforation, post polypectomy syndrome, splenic injury, need for additional procedures or surgery, risks of anesthesia. Patient understands it is their responsibility to call office for pathology results if they do not hear from my office within 1-2 weeks. All questions answered.     Daphne Chacon MD  11/21/2022

## 2022-11-21 NOTE — DISCHARGE INSTRUCTIONS
ENDOSCOPY DISCHARGE INSTRUCTIONS:    Call the physician that did your procedure for any questions or concerns:           DR. Ezra Weiss:  [phone]               ACTIVITY:    There are potential side effects to the medications used for sedation and anesthesia during your procedure. These include:  Dizziness or light-headedness, confusion or memory loss, delayed reaction times, loss of coordination, nausea and vomiting. Because of your increased risk for injury, we ask that you observe the following precautions: For the next 24 hours,  DO NOT operate an automobile, bicycle, motorcycle, , power tools or large equipment of any kind. Do not drink alcohol, sign any legal documents or make any legal decisions for 24 hours. Do not bend your head over lower than your heart. DO sit on the side of bed/couch awhile before getting up. Plan on bedrest or quiet relaxation today. You may resume normal activities in 24 hours. DIET:    Your first meal today should be light, avoiding spicy and fatty foods. If you tolerate this first meal,  then you may advance to your regular diet unless otherwise advised by your physician. NORMAL SYMPTOMS:  -Sore throat if youve had an EGD  -Gaseous discomfort    NOTIFY YOUR PHYSICIAN IF THESE SYMPTOMS OCCUR:  1. Fever (greater than 100)  5. Increased abdominal bloating  2. Severe pain    6. Excessive bleeding  3. Nausea and vomiting  7. Chest pain                                                                    4. Chills    8. Shortness of breath      ADDITIONAL INSTRUCTIONS:    Biopsy results:  WILL CALL YOU IN 1 WEEK WITH BIOPSY RESULTS. Educational Information: Esophogeal biopsy for GERD    Follow up appointment:                               Please review these discharge instructions this evening or tomorrow for   information you may have forgotten. We want to thank you for choosing the [facility] as your health care provider.  We always strive to provide you with excellent care while you are here. You may receive a survey in the mail regarding your care. We would appreciate you taking a few minutes of your time to complete this survey.  Again, thank you for choosing the [facility]

## 2022-12-01 ENCOUNTER — TELEPHONE (OUTPATIENT)
Dept: INTERNAL MEDICINE CLINIC | Age: 72
End: 2022-12-01

## 2022-12-01 DIAGNOSIS — E27.8 ADRENAL NODULE (HCC): ICD-10-CM

## 2022-12-01 DIAGNOSIS — R93.5 ABNORMAL CT OF THE ABDOMEN: Primary | ICD-10-CM

## 2022-12-01 NOTE — TELEPHONE ENCOUNTER
Calling to get the order changed for the MRI, Can with and with out be added to the MRI Abdomen WO Contrast        Please advise

## 2022-12-02 NOTE — TELEPHONE ENCOUNTER
Dr. Clarence Hutchinson this was ordered and recommended by you:         From: Berenice Drake  To: Dr. Buster Pierre: 10/27/2022  4:44 PM EDT  Subject: Ct scan at [facility]    I had stomach pains went to the ER today. no appendicitis and didnt find the reason for the pain I was having but they found a adrenal nodule and dr sadler said to follow up with you? Let me know  thank you               CS    4:39 PM  You routed this conversation to Lucille Maldonado MD   October 29, 2022  Lucille Maldonado MD  to [facility] Practice Support    BG    6:54 AM  Note   ?  Significance but F/U with Adrenal protocol MRI

## 2022-12-05 ENCOUNTER — OFFICE VISIT (OUTPATIENT)
Dept: INTERNAL MEDICINE CLINIC | Age: 72
End: 2022-12-05
Payer: MEDICARE

## 2022-12-05 VITALS
HEART RATE: 60 BPM | RESPIRATION RATE: 12 BRPM | TEMPERATURE: 96.9 F | OXYGEN SATURATION: 100 % | SYSTOLIC BLOOD PRESSURE: 110 MMHG | WEIGHT: 180.2 LBS | DIASTOLIC BLOOD PRESSURE: 70 MMHG | BODY MASS INDEX: 24.41 KG/M2 | HEIGHT: 72 IN

## 2022-12-05 DIAGNOSIS — R10.30 LOWER ABDOMINAL PAIN: ICD-10-CM

## 2022-12-05 DIAGNOSIS — G43.109 MIGRAINE WITH AURA AND WITHOUT STATUS MIGRAINOSUS, NOT INTRACTABLE: ICD-10-CM

## 2022-12-05 DIAGNOSIS — E78.5 HYPERLIPIDEMIA LDL GOAL <70: ICD-10-CM

## 2022-12-05 DIAGNOSIS — F51.01 PRIMARY INSOMNIA: ICD-10-CM

## 2022-12-05 DIAGNOSIS — I25.10 CORONARY ARTERY DISEASE INVOLVING NATIVE CORONARY ARTERY OF NATIVE HEART WITHOUT ANGINA PECTORIS: ICD-10-CM

## 2022-12-05 DIAGNOSIS — J43.2 CENTRILOBULAR EMPHYSEMA (HCC): ICD-10-CM

## 2022-12-05 DIAGNOSIS — E27.8 RIGHT ADRENAL MASS (HCC): ICD-10-CM

## 2022-12-05 DIAGNOSIS — K21.9 GASTROESOPHAGEAL REFLUX DISEASE WITHOUT ESOPHAGITIS: ICD-10-CM

## 2022-12-05 PROCEDURE — 3017F COLORECTAL CA SCREEN DOC REV: CPT | Performed by: INTERNAL MEDICINE

## 2022-12-05 PROCEDURE — 99214 OFFICE O/P EST MOD 30 MIN: CPT | Performed by: INTERNAL MEDICINE

## 2022-12-05 PROCEDURE — G8427 DOCREV CUR MEDS BY ELIG CLIN: HCPCS | Performed by: INTERNAL MEDICINE

## 2022-12-05 PROCEDURE — 3023F SPIROM DOC REV: CPT | Performed by: INTERNAL MEDICINE

## 2022-12-05 PROCEDURE — G8484 FLU IMMUNIZE NO ADMIN: HCPCS | Performed by: INTERNAL MEDICINE

## 2022-12-05 PROCEDURE — G8420 CALC BMI NORM PARAMETERS: HCPCS | Performed by: INTERNAL MEDICINE

## 2022-12-05 PROCEDURE — 1036F TOBACCO NON-USER: CPT | Performed by: INTERNAL MEDICINE

## 2022-12-05 PROCEDURE — 1123F ACP DISCUSS/DSCN MKR DOCD: CPT | Performed by: INTERNAL MEDICINE

## 2022-12-05 SDOH — ECONOMIC STABILITY: FOOD INSECURITY: WITHIN THE PAST 12 MONTHS, YOU WORRIED THAT YOUR FOOD WOULD RUN OUT BEFORE YOU GOT MONEY TO BUY MORE.: NEVER TRUE

## 2022-12-05 SDOH — ECONOMIC STABILITY: FOOD INSECURITY: WITHIN THE PAST 12 MONTHS, THE FOOD YOU BOUGHT JUST DIDN'T LAST AND YOU DIDN'T HAVE MONEY TO GET MORE.: NEVER TRUE

## 2022-12-05 ASSESSMENT — ENCOUNTER SYMPTOMS
ALLERGIC/IMMUNOLOGIC NEGATIVE: 1
CHEST TIGHTNESS: 1
DIARRHEA: 1
ABDOMINAL PAIN: 1
PHOTOPHOBIA: 1

## 2022-12-05 ASSESSMENT — PATIENT HEALTH QUESTIONNAIRE - PHQ9
SUM OF ALL RESPONSES TO PHQ QUESTIONS 1-9: 0
SUM OF ALL RESPONSES TO PHQ9 QUESTIONS 1 & 2: 0
SUM OF ALL RESPONSES TO PHQ QUESTIONS 1-9: 0
1. LITTLE INTEREST OR PLEASURE IN DOING THINGS: 0
2. FEELING DOWN, DEPRESSED OR HOPELESS: 0

## 2022-12-05 ASSESSMENT — SOCIAL DETERMINANTS OF HEALTH (SDOH): HOW HARD IS IT FOR YOU TO PAY FOR THE VERY BASICS LIKE FOOD, HOUSING, MEDICAL CARE, AND HEATING?: NOT HARD AT ALL

## 2022-12-05 NOTE — TELEPHONE ENCOUNTER
Per Dr. Pam Suárez ok to change just make sure Adrenal Protocol will be done. Spoke with Ada Fonseca and yes Adrenal Protocol will be done, order updated.

## 2022-12-05 NOTE — ASSESSMENT & PLAN NOTE
Monitored by specialist- no acute findings meriting change in the plan on and off c/o recent EGD as noted

## 2022-12-05 NOTE — PROGRESS NOTES
Patient ID: Pasha Ruiz is a 71 y.o. male. HPI  Here today for follow up of chronic problems as per HPI and as problems listed under assessment and plan,no new c/o feels good     Allergies   Allergen Reactions    Adhesive Tape Rash       Current Outpatient Medications   Medication Sig Dispense Refill    triazolam (HALCION) 0.25 MG tablet TAKE ONE TABLET BY MOUTH ONCE NIGHTLY 60 tablet 0    clonazePAM (KLONOPIN) 0.5 MG tablet TAKE ONE TABLET BY MOUTH DAILY AS NEEDED FOR ANXIETY 30 tablet 0    meclizine (ANTIVERT) 25 MG tablet TAKE ONE TABLET BY MOUTH THREE TIMES A DAY AS NEEDED FOR DIZZINESS 30 tablet 1    sucralfate (CARAFATE) 1 GM tablet Take 1 tablet by mouth 4 times daily 120 tablet 2    clindamycin (CLINDAGEL) 1 % gel APPLY TO AFFECTED AREA(S) DAILY AS NEEDED 30 g 3    pantoprazole (PROTONIX) 40 MG tablet TAKE ONE TABLET BY MOUTH DAILY 90 tablet 1    rosuvastatin (CRESTOR) 20 MG tablet TAKE ONE TABLET BY MOUTH DAILY 90 tablet 1    dicyclomine (BENTYL) 20 MG tablet Take 1 tablet by mouth 3 times daily as needed (Abd c/o) 90 tablet 1    Rimegepant Sulfate (NURTEC) 75 MG TBDP Take 75 mg by mouth as needed (HA) 12 tablet 1    metoprolol succinate (TOPROL XL) 25 MG extended release tablet Take 1 tablet by mouth daily 90 tablet 3    albuterol sulfate  (90 Base) MCG/ACT inhaler TAKE 2 PUFFS BY MOUTH EVERY 6 HOURS AS NEEDED FOR WHEEZE 18 each 1    aspirin 81 MG EC tablet Take 81 mg by mouth daily      Ascorbic Acid (VITAMIN C) 500 MG CAPS Take 1 capsule by mouth daily      Multiple Vitamins-Minerals (MULTIVITAMIN ADULTS 50+) TABS Take by mouth daily      Simethicone (GAS-X PO) Take by mouth as needed      acetaminophen (TYLENOL) 325 MG tablet Take 650 mg by mouth every 6 hours as needed for Pain       No current facility-administered medications for this visit.        Past Medical History:   Diagnosis Date    Acid reflux     Asthma     CAD (coronary artery disease)     COPD (chronic obstructive pulmonary disease) ([facility] [address]     mild    Hyperlipidemia     Insomnia     Vertigo        Family History   Problem Relation Age of Onset    Diabetes Mother     Heart Disease Father     Coronary Art Dis Brother        Past Surgical History:   Procedure Laterality Date    COLONOSCOPY      05/05/2017, diverticulosis, repeat 5/15/2026 Dr. Aliyah Patino    COLONOSCOPY N/A 10/5/2022    COLONOSCOPY POLYPECTOMY SNARE/COLD BIOPSY performed by Jourdan Rain MD at [facility]  2019    2 Vessels     ESOPHAGEAL DILATATION      ESOPHAGOSCOPY N/A 11/21/2022    ESOPHAGOSCOPY BIOPSY performed by Jourdan Rain MD at [place] [address] Right     PROSTATE SURGERY  2017    TURP       Social History     Socioeconomic History    Marital status: Single     Spouse name: Not on file    Number of children: Not on file    Years of education: Not on file    Highest education level: Not on file   Occupational History    Not on file   Tobacco Use    Smoking status: Former     Packs/day: 1.00     Years: 50.00     Pack years: 50.00     Types: Cigarettes     Quit date: 08/2019     Years since quitting: 3.3    Smokeless tobacco: Never   Vaping Use    Vaping Use: Never used   Substance and Sexual Activity    Alcohol use: Not Currently     Comment: Rarely    Drug use: Never    Sexual activity: Not on file   Other Topics Concern    Not on file   Social History Narrative    Not on file     Social Determinants of Health     Financial Resource Strain: Low Risk     Difficulty of Paying Living Expenses: Not hard at all   Food Insecurity: No Food Insecurity    Worried About Running Out of Food in the Last Year: Never true    Ran Out of Food in the Last Year: Never true   Transportation Needs: Not on file   Physical Activity: Unknown    Days of Exercise per Week: 0 days    Minutes of Exercise per Session: Not on file   Stress: Not on file   Social Connections: Not on file   Intimate Partner Violence: Not on file   Housing Stability: Not on file       Review of Systems  Review of Systems   Constitutional:  Negative for fever and unexpected weight change. HENT:  Positive for congestion and postnasal drip. Middle ear dizziness Tx with prn Klonapin and anitvert    Eyes:  Positive for photophobia. Glasses  Has floaters    Respiratory:  Positive for chest tightness. Cardiovascular:  Negative for chest pain, palpitations and leg swelling. Gastrointestinal:  Positive for abdominal pain and diarrhea. GERD cont diet and meds    Genitourinary:  Positive for decreased urine volume. Nocturia s/p TURP    Musculoskeletal:  Positive for arthralgias (in hands ). Allergic/Immunologic: Negative. Neurological:  Positive for dizziness. Psychiatric/Behavioral:  Positive for sleep disturbance (has taken Halcion for years ). Objective:   Physical Exam:  Physical Exam  Constitutional:       Appearance: Normal appearance. HENT:      Head: Normocephalic and atraumatic. Right Ear: External ear normal.      Left Ear: External ear normal.   Eyes:      Extraocular Movements: Extraocular movements intact. Conjunctiva/sclera: Conjunctivae normal.      Pupils: Pupils are equal, round, and reactive to light. Cardiovascular:      Rate and Rhythm: Normal rate and regular rhythm. Pulses: Normal pulses. Heart sounds: Normal heart sounds. Pulmonary:      Effort: Pulmonary effort is normal.      Breath sounds: Normal breath sounds. Abdominal:      General: Abdomen is flat. Bowel sounds are normal.      Palpations: Abdomen is soft. Musculoskeletal:         General: Normal range of motion. Cervical back: Normal range of motion. Skin:     General: Skin is warm and dry. Neurological:      General: No focal deficit present. Mental Status: He is alert and oriented to person, place, and time. Mental status is at baseline.    Psychiatric:         Mood and Affect: Mood normal. Thought Content:  Thought content normal.       /70 (Site: Right Upper Arm, Position: Sitting, Cuff Size: Medium Adult)   Pulse 60   Temp 96.9 °F (36.1 °C)   Resp 12   Ht 6' (1.829 m)   Wt 180 lb 3.2 oz (81.7 kg)   SpO2 100%   BMI 24.44 kg/m²       Assessment & Plan:         Migraine with aura, not intractable   Well-controlled, continue current medications and lifestyle modifications recommended    Lower abdominal pain   Monitored by specialist- no acute findings meriting change in the plan on and off c/o recent EGD as noted     Coronary artery disease involving native coronary artery   Monitored by specialist- no acute findings meriting change in the plan    Hyperlipidemia LDL goal <70   At goal, continue current medications and lifestyle modifications recommended    Gastroesophageal reflux disease without esophagitis   Borderline controlled, continue current medications and lifestyle modifications recommended cont F/U with GI meds and diet     Primary insomnia   Borderline controlled, continue current medications and lifestyle modifications recommended    Pulmonary emphysema (HCC)   Well-controlled, continue current medications and lifestyle modifications recommended cont meds prn     Right adrenal mass (HCC)   No symptoms send for F/U MRI with contrast

## 2022-12-05 NOTE — ASSESSMENT & PLAN NOTE
Borderline controlled, continue current medications and lifestyle modifications recommended cont F/U with GI meds and diet

## 2022-12-06 ENCOUNTER — HOSPITAL ENCOUNTER (OUTPATIENT)
Dept: MRI IMAGING | Age: 72
Discharge: HOME | End: 2022-12-06
Payer: MEDICARE

## 2022-12-06 DIAGNOSIS — E27.8 ADRENAL NODULE (HCC): ICD-10-CM

## 2022-12-06 DIAGNOSIS — R93.5 ABNORMAL CT OF THE ABDOMEN: ICD-10-CM

## 2022-12-06 PROCEDURE — 6360000004 HC RX CONTRAST MEDICATION: Performed by: INTERNAL MEDICINE

## 2022-12-06 PROCEDURE — G1010 CDSM STANSON: HCPCS

## 2022-12-06 PROCEDURE — A9579 GAD-BASE MR CONTRAST NOS,1ML: HCPCS | Performed by: INTERNAL MEDICINE

## 2022-12-06 RX ADMIN — GADOTERIDOL 18 ML: 279.3 INJECTION, SOLUTION INTRAVENOUS at 09:19

## 2023-01-05 ENCOUNTER — TELEPHONE (OUTPATIENT)
Dept: INTERNAL MEDICINE CLINIC | Age: 73
End: 2023-01-05

## 2023-01-05 NOTE — TELEPHONE ENCOUNTER
Appointment Request  (Newest Message First)  Linnette Landry"  Patient Appointment Schedule Request Pool 3 days ago     RA  Appointment Request From: Guerrero Freedman     [facility] Provider[facility]  [address]     Preferred Date Range: 1/3/2023 - 1/4/2023     Preferred Times: Any Time     Reason for visit: Office Visit     Comments:  Have breathing issue

## 2023-01-06 ENCOUNTER — OFFICE VISIT (OUTPATIENT)
Dept: INTERNAL MEDICINE CLINIC | Age: 73
End: 2023-01-06

## 2023-01-06 ENCOUNTER — HOSPITAL ENCOUNTER (OUTPATIENT)
Dept: GENERAL RADIOLOGY | Age: 73
Discharge: HOME | End: 2023-01-06
Payer: MEDICARE

## 2023-01-06 VITALS
BODY MASS INDEX: 24.11 KG/M2 | DIASTOLIC BLOOD PRESSURE: 80 MMHG | HEIGHT: 72 IN | SYSTOLIC BLOOD PRESSURE: 136 MMHG | HEART RATE: 71 BPM | OXYGEN SATURATION: 99 % | TEMPERATURE: 97.8 F | WEIGHT: 178 LBS

## 2023-01-06 DIAGNOSIS — R42 VERTIGO: ICD-10-CM

## 2023-01-06 DIAGNOSIS — R06.02 SOB (SHORTNESS OF BREATH): Primary | ICD-10-CM

## 2023-01-06 DIAGNOSIS — R06.02 SOB (SHORTNESS OF BREATH): ICD-10-CM

## 2023-01-06 PROCEDURE — 71046 X-RAY EXAM CHEST 2 VIEWS: CPT

## 2023-01-06 RX ORDER — ALBUTEROL SULFATE 90 UG/1
2 AEROSOL, METERED RESPIRATORY (INHALATION) 4 TIMES DAILY PRN
Qty: 18 G | Refills: 0 | Status: SHIPPED | OUTPATIENT
Start: 2023-01-06

## 2023-01-06 ASSESSMENT — ENCOUNTER SYMPTOMS
CONSTIPATION: 0
COUGH: 0
CHEST TIGHTNESS: 0
TROUBLE SWALLOWING: 0
APNEA: 0
DIARRHEA: 0
ABDOMINAL DISTENTION: 0
RHINORRHEA: 0
NAUSEA: 0
WHEEZING: 0
VOMITING: 0
SINUS PAIN: 0
STRIDOR: 0
CHOKING: 0
ABDOMINAL PAIN: 0
VOICE CHANGE: 0
SINUS PRESSURE: 0
SORE THROAT: 0
PHOTOPHOBIA: 0
SHORTNESS OF BREATH: 1

## 2023-01-06 ASSESSMENT — PATIENT HEALTH QUESTIONNAIRE - PHQ9
SUM OF ALL RESPONSES TO PHQ QUESTIONS 1-9: 0
SUM OF ALL RESPONSES TO PHQ9 QUESTIONS 1 & 2: 0
1. LITTLE INTEREST OR PLEASURE IN DOING THINGS: 0
SUM OF ALL RESPONSES TO PHQ QUESTIONS 1-9: 0
2. FEELING DOWN, DEPRESSED OR HOPELESS: 0

## 2023-01-06 NOTE — ASSESSMENT & PLAN NOTE
Most likely postviral reactive airway given her recent mild URI episode  Will obtain x-ray for further evaluation

## 2023-01-06 NOTE — PROGRESS NOTES
[facility]  Acute visit   2023    Alison Suero (:  1950) is a 71 y.o. male, here for evaluation of the following medical concerns:    Chief Complaint   Patient presents with    Cough     Covid negative    Shortness of Breath    Dizziness        ASSESSMENT/ PLAN:  Auditory vertigo involving both ears       Vertigo  Has a history of intermittent vertigo before in the past, and patient report that this episode is somewhat similar  -Orthostatic check in the office negative  -continue Antivert and Klonopin as needed  -If no improvement, can consider to refer to ENT    SOB (shortness of breath)  Most likely postviral reactive airway given her recent mild URI episode  Will obtain x-ray for further evaluation          Return in about 3 months (around 2023). HPI    Symptoms started since Thursday of last week   Had cough which resolved  Now has SOB and dizziness   SOB can happen at anytime.  But not associated with exertion   Went to work (works as a  at [employer])  Dizziness started about 3 days ago and can last about   Describe his dizziness as out of the bed but not room spinning   Felt better with Klonopin     Denies any chest pain, lightheadedness, palpitation          HISTORIES  Current Outpatient Medications on File Prior to Visit   Medication Sig Dispense Refill    triazolam (HALCION) 0.25 MG tablet TAKE ONE TABLET BY MOUTH ONCE NIGHTLY 60 tablet 0    meclizine (ANTIVERT) 25 MG tablet TAKE ONE TABLET BY MOUTH THREE TIMES A DAY AS NEEDED FOR DIZZINESS 30 tablet 1    sucralfate (CARAFATE) 1 GM tablet Take 1 tablet by mouth 4 times daily 120 tablet 2    clindamycin (CLINDAGEL) 1 % gel APPLY TO AFFECTED AREA(S) DAILY AS NEEDED 30 g 3    pantoprazole (PROTONIX) 40 MG tablet TAKE ONE TABLET BY MOUTH DAILY 90 tablet 1    rosuvastatin (CRESTOR) 20 MG tablet TAKE ONE TABLET BY MOUTH DAILY 90 tablet 1    dicyclomine (BENTYL) 20 MG tablet Take 1 tablet by mouth 3 times daily as needed (Abd c/o) 90 tablet 1    metoprolol succinate (TOPROL XL) 25 MG extended release tablet Take 1 tablet by mouth daily 90 tablet 3    albuterol sulfate  (90 Base) MCG/ACT inhaler TAKE 2 PUFFS BY MOUTH EVERY 6 HOURS AS NEEDED FOR WHEEZE 18 each 1    aspirin 81 MG EC tablet Take 81 mg by mouth daily      Ascorbic Acid (VITAMIN C) 500 MG CAPS Take 1 capsule by mouth daily      Multiple Vitamins-Minerals (MULTIVITAMIN ADULTS 50+) TABS Take by mouth daily      Simethicone (GAS-X PO) Take by mouth as needed      acetaminophen (TYLENOL) 325 MG tablet Take 650 mg by mouth every 6 hours as needed for Pain      clonazePAM (KLONOPIN) 0.5 MG tablet TAKE ONE TABLET BY MOUTH DAILY AS NEEDED FOR ANXIETY 30 tablet 0    Rimegepant Sulfate (NURTEC) 75 MG TBDP Take 75 mg by mouth as needed (HA) 12 tablet 1     No current facility-administered medications on file prior to visit.       Allergies   Allergen Reactions    Adhesive Tape Rash     Past Medical History:   Diagnosis Date    Acid reflux     Asthma     CAD (coronary artery disease)     COPD (chronic obstructive pulmonary disease) (HCC)     mild    Hyperlipidemia     Insomnia     Vertigo      Patient Active Problem List   Diagnosis    Coronary artery disease involving native coronary artery    Hyperlipidemia LDL goal <70    Gastroesophageal reflux disease without esophagitis    Auditory vertigo involving both ears    Primary insomnia    Benign prostatic hyperplasia with lower urinary tract symptoms    Vertigo    Pulmonary emphysema (HCC)    Allergic rhinitis    Coronary artery disease involving native coronary artery of native heart with angina pectoris (HCC)    Left foot pain    Migraine with aura, not intractable    Lower abdominal pain    Right adrenal mass (HCC)    SOB (shortness of breath)     Past Surgical History:   Procedure Laterality Date    COLONOSCOPY      05/05/2017, diverticulosis, repeat 5/15/2026 Dr. Mcgarry    COLONOSCOPY N/A 10/5/2022    COLONOSCOPY POLYPECTOMY SNARE/COLD BIOPSY performed by Ammy Long MD at [address] [place] [address]  2019    2 Vessels     ESOPHAGEAL DILATATION      ESOPHAGOSCOPY N/A 11/21/2022    ESOPHAGOSCOPY BIOPSY performed by Ammy Logn MD at [place] [address] Right     PROSTATE SURGERY  2017    TURP     Social History     Socioeconomic History    Marital status: Single     Spouse name: Not on file    Number of children: Not on file    Years of education: Not on file    Highest education level: Not on file   Occupational History    Not on file   Tobacco Use    Smoking status: Former     Packs/day: 1.00     Years: 50.00     Pack years: 50.00     Types: Cigarettes     Quit date: 08/2019     Years since quitting: 3.4    Smokeless tobacco: Never   Vaping Use    Vaping Use: Never used   Substance and Sexual Activity    Alcohol use: Not Currently     Comment: Rarely    Drug use: Never    Sexual activity: Not on file   Other Topics Concern    Not on file   Social History Narrative    Not on file     Social Determinants of Health     Financial Resource Strain: Low Risk     Difficulty of Paying Living Expenses: Not hard at all   Food Insecurity: No Food Insecurity    Worried About Running Out of Food in the Last Year: Never true    Ran Out of Food in the Last Year: Never true   Transportation Needs: Not on file   Physical Activity: Unknown    Days of Exercise per Week: 0 days    Minutes of Exercise per Session: Not on file   Stress: Not on file   Social Connections: Not on file   Intimate Partner Violence: Not on file   Housing Stability: Not on file      Family History   Problem Relation Age of Onset    Diabetes Mother     Heart Disease Father     Coronary Art Dis Brother        ROS  Review of Systems   Constitutional:  Negative for activity change, appetite change, chills, diaphoresis, fatigue, fever and unexpected weight change.    HENT:  Negative for congestion, ear discharge, ear pain, postnasal drip, rhinorrhea, sinus pressure, sinus pain, sore throat, tinnitus, trouble swallowing and voice change. Eyes:  Negative for photophobia and visual disturbance. Respiratory:  Positive for shortness of breath. Negative for apnea, cough, choking, chest tightness, wheezing and stridor. Cardiovascular:  Negative for chest pain, palpitations and leg swelling. Gastrointestinal:  Negative for abdominal distention, abdominal pain, constipation, diarrhea, nausea and vomiting. Genitourinary:  Negative for difficulty urinating and dysuria. Musculoskeletal:  Negative for arthralgias and myalgias. Skin:  Negative for rash and wound. Neurological:  Negative for dizziness, weakness and light-headedness. Hematological:  Negative for adenopathy. Psychiatric/Behavioral:  Negative for agitation and behavioral problems. PE  Vitals:    01/06/23 0930   BP: 136/80   Site: Left Upper Arm   Position: Sitting   Pulse: 71   Temp: 97.8 °F (36.6 °C)   SpO2: 99%   Weight: 178 lb (80.7 kg)   Height: 6' (1.829 m)     Estimated body mass index is 24.14 kg/m² as calculated from the following:    Height as of this encounter: 6' (1.829 m). Weight as of this encounter: 178 lb (80.7 kg). Physical Exam  Vitals and nursing note reviewed. Constitutional:       General: He is not in acute distress. Appearance: Normal appearance. He is well-developed. He is not diaphoretic. HENT:      Head: Normocephalic and atraumatic. Right Ear: Tympanic membrane, ear canal and external ear normal. There is no impacted cerumen. Left Ear: Tympanic membrane, ear canal and external ear normal. There is no impacted cerumen. Nose: Nose normal. No congestion or rhinorrhea. Mouth/Throat:      Mouth: Mucous membranes are moist.      Pharynx: Oropharynx is clear. Eyes:      General: No scleral icterus.      Conjunctiva/sclera: Conjunctivae normal.      Pupils: Pupils are equal, round, and reactive to light. Cardiovascular:      Rate and Rhythm: Normal rate and regular rhythm. Pulses: Normal pulses. Heart sounds: Normal heart sounds. No murmur heard. No friction rub. No gallop. Pulmonary:      Effort: Pulmonary effort is normal. No respiratory distress. Breath sounds: Normal breath sounds. No wheezing or rales. Chest:      Chest wall: No tenderness. Abdominal:      General: Bowel sounds are normal. There is no distension. Palpations: Abdomen is soft. Musculoskeletal:         General: No tenderness or deformity. Normal range of motion. Cervical back: Normal range of motion and neck supple. Skin:     General: Skin is warm and dry. Neurological:      Mental Status: He is alert and oriented to person, place, and time. Cranial Nerves: No cranial nerve deficit. Sensory: No sensory deficit. Psychiatric:         Behavior: Behavior normal.         Charles Jade MD    This dictation was generated by voice recognition computer software. Although all attempts are made to edit the dictation for accuracy, there may be errors in the transcription that are not intended.

## 2023-01-06 NOTE — ASSESSMENT & PLAN NOTE
Has a history of intermittent vertigo before in the past, and patient report that this episode is somewhat similar  -Orthostatic check in the office negative  -continue Antivert and Klonopin as needed  -If no improvement, can consider to refer to ENT

## 2023-01-17 ENCOUNTER — OFFICE VISIT (OUTPATIENT)
Dept: CARDIOLOGY CLINIC | Age: 73
End: 2023-01-17
Payer: MEDICARE

## 2023-01-17 VITALS
WEIGHT: 184 LBS | DIASTOLIC BLOOD PRESSURE: 80 MMHG | BODY MASS INDEX: 24.95 KG/M2 | SYSTOLIC BLOOD PRESSURE: 134 MMHG | HEART RATE: 68 BPM

## 2023-01-17 DIAGNOSIS — I25.10 CAD IN NATIVE ARTERY: Primary | ICD-10-CM

## 2023-01-17 DIAGNOSIS — Z95.1 HX OF CABG: ICD-10-CM

## 2023-01-17 DIAGNOSIS — E78.5 HYPERLIPIDEMIA, UNSPECIFIED HYPERLIPIDEMIA TYPE: ICD-10-CM

## 2023-01-17 PROCEDURE — 3017F COLORECTAL CA SCREEN DOC REV: CPT | Performed by: INTERNAL MEDICINE

## 2023-01-17 PROCEDURE — 99214 OFFICE O/P EST MOD 30 MIN: CPT | Performed by: INTERNAL MEDICINE

## 2023-01-17 PROCEDURE — 1123F ACP DISCUSS/DSCN MKR DOCD: CPT | Performed by: INTERNAL MEDICINE

## 2023-01-17 PROCEDURE — 1036F TOBACCO NON-USER: CPT | Performed by: INTERNAL MEDICINE

## 2023-01-17 PROCEDURE — G8484 FLU IMMUNIZE NO ADMIN: HCPCS | Performed by: INTERNAL MEDICINE

## 2023-01-17 PROCEDURE — G8420 CALC BMI NORM PARAMETERS: HCPCS | Performed by: INTERNAL MEDICINE

## 2023-01-17 PROCEDURE — G8427 DOCREV CUR MEDS BY ELIG CLIN: HCPCS | Performed by: INTERNAL MEDICINE

## 2023-01-17 ASSESSMENT — ENCOUNTER SYMPTOMS
SHORTNESS OF BREATH: 0
APNEA: 0
CHEST TIGHTNESS: 0
COUGH: 0
CHOKING: 0
ABDOMINAL DISTENTION: 0

## 2023-01-17 NOTE — PROGRESS NOTES
Subjective:      Patient ID: Tatyana Tony is a 71 y.o. male. HPI  Former pt Dr Marko Fernandez. Follow up for CAD/CABG/lipids. Walks 4x per wk. Never exertion related. No pnd /orthopnea. No edema. No tachy/syncope. No further chest sx. Feeling great. No tachy/syncope.      Past Medical History:   Diagnosis Date    Acid reflux     Asthma     CAD (coronary artery disease)     COPD (chronic obstructive pulmonary disease) (HCC)     mild    Hyperlipidemia     Insomnia     Vertigo      Past Surgical History:   Procedure Laterality Date    COLONOSCOPY      05/05/2017, diverticulosis, repeat 5/15/2026 Dr. Jared Huggins    COLONOSCOPY N/A 10/5/2022    COLONOSCOPY POLYPECTOMY SNARE/COLD BIOPSY performed by Cheyenne Thomas MD at [facility]  2019    2 Vessels     ESOPHAGEAL DILATATION      ESOPHAGOSCOPY N/A 11/21/2022    ESOPHAGOSCOPY BIOPSY performed by Cheyenne Thomas MD at [place] [address] Right     PROSTATE SURGERY  2017    TURP     Social History     Socioeconomic History    Marital status: Single     Spouse name: Not on file    Number of children: Not on file    Years of education: Not on file    Highest education level: Not on file   Occupational History    Not on file   Tobacco Use    Smoking status: Former     Packs/day: 1.00     Years: 50.00     Pack years: 50.00     Types: Cigarettes     Quit date: 08/2019     Years since quitting: 3.4    Smokeless tobacco: Never   Vaping Use    Vaping Use: Never used   Substance and Sexual Activity    Alcohol use: Not Currently     Comment: Rarely    Drug use: Never    Sexual activity: Not on file   Other Topics Concern    Not on file   Social History Narrative    Not on file     Social Determinants of Health     Financial Resource Strain: Low Risk     Difficulty of Paying Living Expenses: Not hard at all   Food Insecurity: No Food Insecurity    Worried About [address] [employer] in the Last Year: Never true    Ran Out of Food in the Last Year: Never true   Transportation Needs: Not on file   Physical Activity: Unknown    Days of Exercise per Week: 0 days    Minutes of Exercise per Session: Not on file   Stress: Not on file   Social Connections: Not on file   Intimate Partner Violence: Not on file   Housing Stability: Not on file      reviewd    Vitals:    01/17/23 1530   BP: 134/80   Pulse: 68       Wt 184    Review of Systems   Constitutional:  Negative for activity change and fatigue. Respiratory:  Negative for apnea, cough, choking, chest tightness and shortness of breath. Cardiovascular:  Negative for chest pain, palpitations and leg swelling. No PND or orthopnea. No tachycardia. Gastrointestinal:  Negative for abdominal distention. Musculoskeletal:  Negative for myalgias. Neurological:  Negative for dizziness, syncope and light-headedness. Psychiatric/Behavioral:  Negative for agitation, behavioral problems and confusion. All other systems reviewed and are negative. Objective:   Physical Exam  Constitutional:       General: He is not in acute distress. Appearance: Normal appearance. He is well-developed. HENT:      Head: Normocephalic and atraumatic. Right Ear: External ear normal.      Left Ear: External ear normal.   Neck:      Vascular: No JVD. Cardiovascular:      Rate and Rhythm: Normal rate and regular rhythm. Heart sounds: Normal heart sounds. No gallop. Pulmonary:      Effort: Pulmonary effort is normal. No respiratory distress. Breath sounds: Normal breath sounds. No wheezing or rales. Abdominal:      General: Bowel sounds are normal.      Palpations: Abdomen is soft. Tenderness: There is no abdominal tenderness. Musculoskeletal:         General: Normal range of motion. Cervical back: Normal range of motion and neck supple. Skin:     General: Skin is warm and dry. Neurological:      General: No focal deficit present.       Mental Status: He is alert and oriented to person, place, and time. Psychiatric:         Mood and Affect: Mood normal.         Behavior: Behavior normal.       Assessment:       Diagnosis Orders   1. CAD in native artery        2. Hx of CABG        3. Hyperlipidemia, unspecified hyperlipidemia type                Plan:      CV stable. No further sx. Will continue crestor 20 mg qd, toprol 25 mg qd, asa 81 mg qd for CAD/lipids. Reviewed previous records and testing including myoview 7/22  . No changes. Continue to monitor. Follow up 6 months.          Neda Ahumada, MD

## 2023-02-13 DIAGNOSIS — F51.01 PRIMARY INSOMNIA: ICD-10-CM

## 2023-02-13 NOTE — TELEPHONE ENCOUNTER
Refill-     triazolam (HALCION) 0.25 MG tablet      Has an appt with dr. Braxton Arana 3-8-23    Samantha Light [id number] - [facility], [address] [place] [address]      Please advise

## 2023-02-23 RX ORDER — METOPROLOL SUCCINATE 25 MG/1
TABLET, EXTENDED RELEASE ORAL
Qty: 90 TABLET | Refills: 3 | Status: SHIPPED | OUTPATIENT
Start: 2023-02-23

## 2023-02-27 ENCOUNTER — TELEPHONE (OUTPATIENT)
Dept: CARDIOLOGY CLINIC | Age: 73
End: 2023-02-27

## 2023-02-27 RX ORDER — METOPROLOL SUCCINATE 25 MG/1
TABLET, EXTENDED RELEASE ORAL
Qty: 90 TABLET | Refills: 3 | Status: CANCELLED | OUTPATIENT
Start: 2023-02-27

## 2023-02-27 NOTE — TELEPHONE ENCOUNTER
Requested Prescriptions     Pending Prescriptions Disp Refills    metoprolol succinate (TOPROL XL) 25 MG extended release tablet 90 tablet 3     Sig: TAKE ONE TABLET BY MOUTH DAILY          Number: 90    Refills: 3    Last Office Visit: 1/17/2023     Next Office Visit: 7/19/2023

## 2023-03-06 SDOH — ECONOMIC STABILITY: TRANSPORTATION INSECURITY
IN THE PAST 12 MONTHS, HAS LACK OF TRANSPORTATION KEPT YOU FROM MEETINGS, WORK, OR FROM GETTING THINGS NEEDED FOR DAILY LIVING?: NO

## 2023-03-06 SDOH — ECONOMIC STABILITY: INCOME INSECURITY: HOW HARD IS IT FOR YOU TO PAY FOR THE VERY BASICS LIKE FOOD, HOUSING, MEDICAL CARE, AND HEATING?: NOT VERY HARD

## 2023-03-06 SDOH — ECONOMIC STABILITY: FOOD INSECURITY: WITHIN THE PAST 12 MONTHS, THE FOOD YOU BOUGHT JUST DIDN'T LAST AND YOU DIDN'T HAVE MONEY TO GET MORE.: NEVER TRUE

## 2023-03-06 SDOH — ECONOMIC STABILITY: HOUSING INSECURITY
IN THE LAST 12 MONTHS, WAS THERE A TIME WHEN YOU DID NOT HAVE A STEADY PLACE TO SLEEP OR SLEPT IN A SHELTER (INCLUDING NOW)?: NO

## 2023-03-06 SDOH — ECONOMIC STABILITY: FOOD INSECURITY: WITHIN THE PAST 12 MONTHS, YOU WORRIED THAT YOUR FOOD WOULD RUN OUT BEFORE YOU GOT MONEY TO BUY MORE.: NEVER TRUE

## 2023-03-07 RX ORDER — METOPROLOL SUCCINATE 25 MG/1
TABLET, EXTENDED RELEASE ORAL
Qty: 90 TABLET | Refills: 3 | Status: SHIPPED | OUTPATIENT
Start: 2023-03-07

## 2023-03-07 NOTE — TELEPHONE ENCOUNTER
Patient called and stated he needed a refill to go to [facility] instead of [facility]. [facility] had automatically refilled it for $25. [facility] mail order doesn't charge anything. Patient stated he wanted [facility] removed off his chart. His medications should only be going to [facility]. Patient stated he only has a few pills left.      [facility] Medication Refills:    metoprolol succinate (TOPROL XL) 25 MG extended release tablet [[id number]]     Order Details  Dose, Route, Frequency: As Directed   Dispense Quantity: 90 tablet Refills: 3          Sig: TAKE ONE TABLET BY MOUTH DAILY       Last Office Visit: 1/17/23    Next Office Visit: 7/19/23    [facility]  [phone]  [phone] Fax

## 2023-03-08 ENCOUNTER — OFFICE VISIT (OUTPATIENT)
Dept: INTERNAL MEDICINE CLINIC | Age: 73
End: 2023-03-08
Payer: MEDICARE

## 2023-03-08 VITALS
HEIGHT: 73 IN | SYSTOLIC BLOOD PRESSURE: 126 MMHG | HEART RATE: 77 BPM | BODY MASS INDEX: 24.12 KG/M2 | DIASTOLIC BLOOD PRESSURE: 70 MMHG | WEIGHT: 182 LBS | TEMPERATURE: 97.2 F | OXYGEN SATURATION: 99 %

## 2023-03-08 DIAGNOSIS — E27.8 RIGHT ADRENAL MASS (HCC): ICD-10-CM

## 2023-03-08 DIAGNOSIS — R10.30 LOWER ABDOMINAL PAIN: ICD-10-CM

## 2023-03-08 DIAGNOSIS — K21.9 GASTROESOPHAGEAL REFLUX DISEASE WITHOUT ESOPHAGITIS: ICD-10-CM

## 2023-03-08 DIAGNOSIS — N40.1 BENIGN PROSTATIC HYPERPLASIA WITH URINARY FREQUENCY: ICD-10-CM

## 2023-03-08 DIAGNOSIS — G43.109 MIGRAINE WITH AURA AND WITHOUT STATUS MIGRAINOSUS, NOT INTRACTABLE: ICD-10-CM

## 2023-03-08 DIAGNOSIS — I25.119 CORONARY ARTERY DISEASE INVOLVING NATIVE CORONARY ARTERY OF NATIVE HEART WITH ANGINA PECTORIS (HCC): Primary | ICD-10-CM

## 2023-03-08 DIAGNOSIS — J43.2 CENTRILOBULAR EMPHYSEMA (HCC): ICD-10-CM

## 2023-03-08 DIAGNOSIS — E78.5 HYPERLIPIDEMIA LDL GOAL <70: ICD-10-CM

## 2023-03-08 DIAGNOSIS — R35.0 BENIGN PROSTATIC HYPERPLASIA WITH URINARY FREQUENCY: ICD-10-CM

## 2023-03-08 DIAGNOSIS — F51.01 PRIMARY INSOMNIA: ICD-10-CM

## 2023-03-08 PROCEDURE — 1123F ACP DISCUSS/DSCN MKR DOCD: CPT | Performed by: HOSPITALIST

## 2023-03-08 PROCEDURE — 99214 OFFICE O/P EST MOD 30 MIN: CPT | Performed by: HOSPITALIST

## 2023-03-08 RX ORDER — PANTOPRAZOLE SODIUM 40 MG/1
TABLET, DELAYED RELEASE ORAL
Qty: 90 TABLET | Refills: 3 | Status: SHIPPED | OUTPATIENT
Start: 2023-03-08

## 2023-03-08 RX ORDER — ROSUVASTATIN CALCIUM 20 MG/1
TABLET, COATED ORAL
Qty: 90 TABLET | Refills: 3 | Status: SHIPPED | OUTPATIENT
Start: 2023-03-08

## 2023-03-08 ASSESSMENT — ENCOUNTER SYMPTOMS
DIARRHEA: 0
BACK PAIN: 0
SHORTNESS OF BREATH: 1
TROUBLE SWALLOWING: 0
BLOOD IN STOOL: 0
ABDOMINAL DISTENTION: 0
VOMITING: 0
SINUS PAIN: 0
ABDOMINAL PAIN: 1
SINUS PRESSURE: 0
NAUSEA: 0
CONSTIPATION: 0
SORE THROAT: 0
COUGH: 0
WHEEZING: 0
CHEST TIGHTNESS: 0
VOICE CHANGE: 0

## 2023-03-08 ASSESSMENT — PATIENT HEALTH QUESTIONNAIRE - PHQ9
SUM OF ALL RESPONSES TO PHQ QUESTIONS 1-9: 0
1. LITTLE INTEREST OR PLEASURE IN DOING THINGS: 0
SUM OF ALL RESPONSES TO PHQ QUESTIONS 1-9: 0
2. FEELING DOWN, DEPRESSED OR HOPELESS: 0
SUM OF ALL RESPONSES TO PHQ9 QUESTIONS 1 & 2: 0

## 2023-03-08 NOTE — PROGRESS NOTES
[facility] [address] Internal Medicine  Follow-up visit   3/8/2023    Suri Wan (:  1950) is a 68 y.o. male, here for follow-up:    Chief Complaint   Patient presents with    Established New Doctor     Check(L) hand        HPI  Is here to establish care with me. He is a 59-year-old gentleman with a past medical history of coronary artery disease status post CABG, hyperlipidemia, gastroesophageal reflux disease, migraines, insomnia. CAD/CABG/lipids  Reviewed previous records and testing including myoview . Compliant with crestor 20 mg qd, toprol 25 mg qd, asa 81 mg qd. Denies chest pain, palpitations, or leg swelling. He has some mild shortness of breath which is typically relieved with his albuterol inhaler. Migraine with aura, not intractable  Patient feels this is diet related and precipitated by caffeine or chocolate intake. He has less than 1 migraine monthly. Sampled Nurtec past.  This did not seem to help much. Continuing current lifestyle modifications recommended. Lower abdominal pain  Has seen Dr. oJcelyn Hudson. Patient has had an EGD and colonoscopy by Dr. Andreas Castillo 10/5/2022 (colonoscopy) and 2022 (EGD). No acute findings meriting change in the plan. His symptoms are intermittent. cations recommended     Gastroesophageal reflux disease without esophagitis  Patient's upper endoscopy of 2022. His symptoms are well controlled with daily pantoprazole and occasional use of sucralfate. Lifestyle modifications recommended cont F/U with GI meds and diet . Primary insomnia  Patient is on Halcion 0.25 mg nightly. Typically using this 5 times a week and skip Saturday and Tuesday nights. Pulmonary emphysema (HCC)   Well-controlled, continue current medications and lifestyle modifications recommended cont meds prn      Right adrenal mass (HCC)  No symptoms. MRI 2022 was reviewed.   This was read as benign adenomatous changes of the adrenal glands, benign lipid rich adrenal adenoma. No further follow-up is recommended. ROS  Review of Systems   Constitutional:  Negative for activity change, appetite change, chills, diaphoresis, fatigue, fever and unexpected weight change. HENT:  Negative for sinus pressure, sinus pain, sore throat, trouble swallowing and voice change. Eyes:  Negative for visual disturbance. Respiratory:  Positive for shortness of breath (Mild easily relieved with bronchodilators). Negative for cough, chest tightness and wheezing. Cardiovascular:  Negative for chest pain, palpitations and leg swelling. Gastrointestinal:  Positive for abdominal pain. Negative for abdominal distention, blood in stool, constipation, diarrhea, nausea and vomiting. Reflux   Endocrine: Negative for polydipsia and polyphagia. Genitourinary:  Negative for decreased urine volume, difficulty urinating, dysuria and urgency. Musculoskeletal:  Negative for back pain, gait problem, joint swelling and myalgias. Neurological:  Positive for dizziness (Vertigo) and headaches. Negative for seizures, syncope and light-headedness. Psychiatric/Behavioral:  Positive for sleep disturbance. Negative for agitation, behavioral problems, confusion and suicidal ideas.       HISTORIES  Current Outpatient Medications on File Prior to Visit   Medication Sig Dispense Refill    metoprolol succinate (TOPROL XL) 25 MG extended release tablet TAKE ONE TABLET BY MOUTH DAILY 90 tablet 3    triazolam (HALCION) 0.25 MG tablet TAKE ONE TABLET BY MOUTH ONCE NIGHTLY 60 tablet 0    albuterol sulfate HFA (VENTOLIN HFA) 108 (90 Base) MCG/ACT inhaler Inhale 2 puffs into the lungs 4 times daily as needed for Wheezing 18 g 0    meclizine (ANTIVERT) 25 MG tablet TAKE ONE TABLET BY MOUTH THREE TIMES A DAY AS NEEDED FOR DIZZINESS 30 tablet 1    sucralfate (CARAFATE) 1 GM tablet Take 1 tablet by mouth 4 times daily 120 tablet 2    clindamycin (CLINDAGEL) 1 % gel APPLY TO AFFECTED AREA(S) DAILY AS NEEDED 30 g 3    dicyclomine (BENTYL) 20 MG tablet Take 1 tablet by mouth 3 times daily as needed (Abd c/o) 90 tablet 1    albuterol sulfate  (90 Base) MCG/ACT inhaler TAKE 2 PUFFS BY MOUTH EVERY 6 HOURS AS NEEDED FOR WHEEZE 18 each 1    aspirin 81 MG EC tablet Take 81 mg by mouth daily      Ascorbic Acid (VITAMIN C) 500 MG CAPS Take 1 capsule by mouth daily      Multiple Vitamins-Minerals (MULTIVITAMIN ADULTS 50+) TABS Take by mouth daily      Simethicone (GAS-X PO) Take by mouth as needed      acetaminophen (TYLENOL) 325 MG tablet Take 650 mg by mouth every 6 hours as needed for Pain      clonazePAM (KLONOPIN) 0.5 MG tablet TAKE ONE TABLET BY MOUTH DAILY AS NEEDED FOR ANXIETY 30 tablet 0     No current facility-administered medications on file prior to visit.       Allergies   Allergen Reactions    Adhesive Tape Rash     Past Medical History:   Diagnosis Date    Acid reflux     Asthma     CAD (coronary artery disease)     COPD (chronic obstructive pulmonary disease) (HCC)     mild    Hyperlipidemia     Insomnia     Vertigo      Patient Active Problem List   Diagnosis    Coronary artery disease involving native coronary artery    Hyperlipidemia LDL goal <70    Gastroesophageal reflux disease without esophagitis    Auditory vertigo involving both ears    Primary insomnia    Benign prostatic hyperplasia with lower urinary tract symptoms    Vertigo    Pulmonary emphysema (HCC)    Allergic rhinitis    Coronary artery disease involving native coronary artery of native heart with angina pectoris (HCC)    Left foot pain    Migraine with aura, not intractable    Lower abdominal pain    Right adrenal mass (HCC)    SOB (shortness of breath)     Past Surgical History:   Procedure Laterality Date    COLONOSCOPY      05/05/2017, diverticulosis, repeat 5/15/2026 Dr. Devika Morris    COLONOSCOPY N/A 10/5/2022    COLONOSCOPY POLYPECTOMY SNARE/COLD BIOPSY performed by Debi Wick MD at [place] [address] CORONARY ARTERY BYPASS GRAFT  2019    2 Vessels     ESOPHAGEAL DILATATION      ESOPHAGOSCOPY N/A 11/21/2022    ESOPHAGOSCOPY BIOPSY performed by Kelli Ward MD at [facility] [address] Right     PROSTATE SURGERY  2017    TURP     Social History     Socioeconomic History    Marital status: Single     Spouse name: Not on file    Number of children: Not on file    Years of education: Not on file    Highest education level: Not on file   Occupational History    Not on file   Tobacco Use    Smoking status: Former     Packs/day: 1.00     Years: 50.00     Pack years: 50.00     Types: Cigarettes     Quit date: 08/2019     Years since quitting: 3.6    Smokeless tobacco: Never   Vaping Use    Vaping Use: Never used   Substance and Sexual Activity    Alcohol use: Not Currently     Comment: Rarely    Drug use: Never    Sexual activity: Not on file   Other Topics Concern    Not on file   Social History Narrative    Not on file     Social Determinants of Health     Financial Resource Strain: Low Risk     Difficulty of Paying Living Expenses: Not hard at all   Food Insecurity: No Food Insecurity    Worried About Running Out of Food in the Last Year: Never true    Ran Out of Food in the Last Year: Never true   Transportation Needs: Not on file   Physical Activity: Not on file   Stress: Not on file   Social Connections: Not on file   Intimate Partner Violence: Not on file   Housing Stability: Not on file      Family History   Problem Relation Age of Onset    Diabetes Mother     Heart Disease Father     Coronary Art Dis Brother        PE  Vitals:    03/08/23 1020   BP: 126/70   Pulse: 77   Temp: 97.2 °F (36.2 °C)   SpO2: 99%   Weight: 182 lb (82.6 kg)   Height: 6' 0.5\" (1.842 m)     Estimated body mass index is 24.34 kg/m² as calculated from the following:    Height as of this encounter: 6' 0.5\" (1.842 m). Weight as of this encounter: 182 lb (82.6 kg). Physical Exam  Vitals reviewed. Constitutional:       General: He is not in acute distress. Appearance: Normal appearance. HENT:      Head: Normocephalic and atraumatic. Mouth/Throat:      Pharynx: Oropharynx is clear. Eyes:      Conjunctiva/sclera: Conjunctivae normal.      Pupils: Pupils are equal, round, and reactive to light. Cardiovascular:      Rate and Rhythm: Normal rate and regular rhythm. Pulses: Normal pulses. Heart sounds: Normal heart sounds. Pulmonary:      Effort: Pulmonary effort is normal. No respiratory distress. Breath sounds: Normal breath sounds. No wheezing or rales. Abdominal:      Palpations: Abdomen is soft. Tenderness: There is no abdominal tenderness. There is no rebound. Musculoskeletal:         General: No signs of injury. Normal range of motion. Cervical back: Normal range of motion and neck supple. Skin:     General: Skin is warm and dry. Coloration: Skin is not jaundiced. Neurological:      General: No focal deficit present. Mental Status: He is alert and oriented to person, place, and time. Psychiatric:         Behavior: Behavior normal.         Thought Content: Thought content normal.         Judgment: Judgment normal.       ASSESSMENT/ PLAN:  1. Coronary artery disease involving native coronary artery of native heart with angina pectoris ([address]  - Well controlled on crestor 20 mg qd, toprol 25 mg qd, asa 81 mg qd. - CBC; Future    2. Hyperlipidemia LDL goal <70  - rosuvastatin (CRESTOR) 20 MG tablet; TAKE ONE TABLET BY MOUTH DAILY  Dispense: 90 tablet; Refill: 3  - Lipid, Fasting; Future  - Comprehensive Metabolic Panel; Future    3. Migraine with aura and without status migrainosus, not intractable  -Well controlled with lifestyle modification    4. Gastroesophageal reflux disease without esophagitis  -Continued GI F/U. - pantoprazole (PROTONIX) 40 MG tablet; TAKE ONE TABLET BY MOUTH DAILY  Dispense: 90 tablet; Refill: 3    5.  Primary insomnia  -Continue Halcyon    6. Centrilobular emphysema ([address]  - Well controlled on Prn albuterol.   - CBC; Future    7. Lower abdominal pain  -As per GI. Endoscopy reviewed    8. Right adrenal mass (HCC)  -Benign. 9. Benign prostatic hyperplasia with urinary frequency  - PSA, Prostatic Specific Antigen; Future       Orders Placed This Encounter   Procedures    Lipid, Fasting    PSA, Prostatic Specific Antigen    Comprehensive Metabolic Panel    CBC      Orders Placed This Encounter   Medications    pantoprazole (PROTONIX) 40 MG tablet     Sig: TAKE ONE TABLET BY MOUTH DAILY     Dispense:  90 tablet     Refill:  3    rosuvastatin (CRESTOR) 20 MG tablet     Sig: TAKE ONE TABLET BY MOUTH DAILY     Dispense:  90 tablet     Refill:  3      Medications Discontinued During This Encounter   Medication Reason    rosuvastatin (CRESTOR) 20 MG tablet REORDER    pantoprazole (PROTONIX) 40 MG tablet REORDER        Return in about 6 months (around 9/8/2023) for AWV. Teresa Russell MD    This dictation was generated by voice recognition computer software. Although all attempts are made to edit the dictation for accuracy, there may be errors in the transcription that are not intended.

## 2023-03-18 ENCOUNTER — HOSPITAL ENCOUNTER (EMERGENCY)
Age: 73
Discharge: HOME | End: 2023-03-18
Attending: STUDENT IN AN ORGANIZED HEALTH CARE EDUCATION/TRAINING PROGRAM
Payer: MEDICARE

## 2023-03-18 ENCOUNTER — APPOINTMENT (OUTPATIENT)
Dept: CT IMAGING | Age: 73
End: 2023-03-18
Payer: MEDICARE

## 2023-03-18 VITALS
HEART RATE: 55 BPM | SYSTOLIC BLOOD PRESSURE: 137 MMHG | WEIGHT: 184 LBS | DIASTOLIC BLOOD PRESSURE: 78 MMHG | TEMPERATURE: 97.6 F | HEIGHT: 72 IN | BODY MASS INDEX: 24.92 KG/M2 | OXYGEN SATURATION: 98 % | RESPIRATION RATE: 16 BRPM

## 2023-03-18 DIAGNOSIS — R42 DIZZINESS: Primary | ICD-10-CM

## 2023-03-18 DIAGNOSIS — S09.90XA INJURY OF HEAD, INITIAL ENCOUNTER: ICD-10-CM

## 2023-03-18 LAB
ANION GAP SERPL CALCULATED.3IONS-SCNC: 7 MMOL/L (ref 3–16)
BASOPHILS # BLD: 0 K/UL (ref 0–0.2)
BASOPHILS NFR BLD: 0.3 %
BUN SERPL-MCNC: 24 MG/DL (ref 7–20)
CALCIUM SERPL-MCNC: 9.3 MG/DL (ref 8.3–10.6)
CHLORIDE SERPL-SCNC: 105 MMOL/L (ref 99–110)
CO2 SERPL-SCNC: 30 MMOL/L (ref 21–32)
CREAT SERPL-MCNC: 0.8 MG/DL (ref 0.8–1.3)
DEPRECATED RDW RBC AUTO: 12.5 % (ref 12.4–15.4)
EOSINOPHIL # BLD: 0.2 K/UL (ref 0–0.6)
EOSINOPHIL NFR BLD: 2.9 %
GFR SERPLBLD CREATININE-BSD FMLA CKD-EPI: >60 ML/MIN/{1.73_M2}
GLUCOSE SERPL-MCNC: 98 MG/DL (ref 70–99)
HCT VFR BLD AUTO: 39.5 % (ref 40.5–52.5)
HGB BLD-MCNC: 13.6 G/DL (ref 13.5–17.5)
LYMPHOCYTES # BLD: 1.4 K/UL (ref 1–5.1)
LYMPHOCYTES NFR BLD: 23.5 %
MCH RBC QN AUTO: 32.5 PG (ref 26–34)
MCHC RBC AUTO-ENTMCNC: 34.3 G/DL (ref 31–36)
MCV RBC AUTO: 94.6 FL (ref 80–100)
MONOCYTES # BLD: 0.4 K/UL (ref 0–1.3)
MONOCYTES NFR BLD: 6.6 %
NEUTROPHILS # BLD: 4 K/UL (ref 1.7–7.7)
NEUTROPHILS NFR BLD: 66.7 %
PLATELET # BLD AUTO: 183 K/UL (ref 135–450)
PMV BLD AUTO: 9.1 FL (ref 5–10.5)
POTASSIUM SERPL-SCNC: 4.5 MMOL/L (ref 3.5–5.1)
RBC # BLD AUTO: 4.18 M/UL (ref 4.2–5.9)
SODIUM SERPL-SCNC: 142 MMOL/L (ref 136–145)
WBC # BLD AUTO: 5.9 K/UL (ref 4–11)

## 2023-03-18 PROCEDURE — 80048 BASIC METABOLIC PNL TOTAL CA: CPT

## 2023-03-18 PROCEDURE — 70498 CT ANGIOGRAPHY NECK: CPT

## 2023-03-18 PROCEDURE — 99285 EMERGENCY DEPT VISIT HI MDM: CPT

## 2023-03-18 PROCEDURE — 6360000004 HC RX CONTRAST MEDICATION: Performed by: PHYSICIAN ASSISTANT

## 2023-03-18 PROCEDURE — 85025 COMPLETE CBC W/AUTO DIFF WBC: CPT

## 2023-03-18 PROCEDURE — 36415 COLL VENOUS BLD VENIPUNCTURE: CPT

## 2023-03-18 PROCEDURE — 70450 CT HEAD/BRAIN W/O DYE: CPT

## 2023-03-18 RX ADMIN — IOPAMIDOL 75 ML: 755 INJECTION, SOLUTION INTRAVENOUS at 17:39

## 2023-03-18 ASSESSMENT — ENCOUNTER SYMPTOMS: SHORTNESS OF BREATH: 0

## 2023-03-18 NOTE — ED PROVIDER NOTES
ED Attending Attestation Note     Date of evaluation: 3/18/2023    This patient was seen by the advance practice provider. I have seen and examined the patient, agree with the workup, evaluation, management and diagnosis. The care plan has been discussed. My assessment reveals very pleasant 81-year-old gentleman with history of migraines with aura, labyrinthitis, asthma, CAD, COPD presenting with headache and feeling off since a fall on Thursday. He reports that he was getting up to change the thermostat when he turned abruptly striking his head against the wall. He denied losing consciousness or having any presyncopal symptoms, chest pain or trouble breathing. Reports that he had a small laceration to his forehead with controlled bleeding. He states that he went to bed but since then has had mild frontal headache described as aching with no associated vision changes, nausea, vomiting, numbness weakness or tingling in extremities or seizure-like activity. Patient initially stated that he felt like the room was spinning though on my exam, patient repeatedly stated that he was not having vertigo-like symptoms similar to his prior lab enteritis episodes and stated that he instead felt intermittently off balance though felt steady while walking with no vertigo or vision changes and stated he was concerned that he had a concussion. Tdap up-to-date within last 5 years. On exam, patient well-appearing seated on stretcher in no acute distress. Patient with no gross cranial nerve abnormalities, 5/5 strength in all extremities, normal FNF and HTS. Patient with small linear healing laceration over the right eyebrow with no signs of surrounding infection. Patient with no midline C-spine tenderness with full range of motion of neck without symptoms. Rest of trauma exam showed no injuries to the chest, abdomen, pelvis or extremities. Patient able to ambulate with steady gait without symptoms.   CT scan showed no acute intracranial hemorrhage. CTA showed no acute dissection with 45% narrowing of the ICA on the left. I discussed patient's findings with him and he continued to remained asymptomatic and able to ambulate without difficulty. Patient felt comfortable going home an dfelt steady ambulating and continued to state to me that he was not having any episodes of vertigo. He was advised to follow-up with his primary doctor and cardiologist as needed and to return to the emergency department immediately for new or worsening symptoms including worsening headache, vision changes, nausea, vomiting, vertigo, numbness weakness or tingling in his extremities or other symptoms that are concerning to him. He expressed understanding and was amenable plan. All questions were answered prior to discharge.      Jeannette Santos MD  03/18/23 Angeline Dodson

## 2023-03-18 NOTE — ED TRIAGE NOTES
Pt states that he hit his head on the corner of the wall Thursday night and since then has been feeling off balance and that he is spinning. Has hx of vertigo.  Small abrasion above R eyebrow

## 2023-03-18 NOTE — DISCHARGE INSTRUCTIONS
ED Course     Today, you were seen in the Emergency Department. You have been diagnosed with:   1. Dizziness    2. Injury of head, initial encounter      Disposition/Plan     IMPORTANT INSTRUCTIONS:  Your work-up here was reassuring including CT scans of your head and neck. You do have some stenosis of your internal carotid artery, please discuss this with your primary care provider for further outpatient management of this. Basic lab work was reassuring as well. Follow-up with your primary care physician as soon as possible regarding your visit. Be sure to return to the Emergency Department immediately if symptoms worsen or new symptoms occur as we discussed, such as focal weakness, slurred speech, passing out. PLEASE FOLLOW UP WITH:  Delfino Eubanks MD  [address] [place] [address]  [phone]    Schedule an appointment as soon as possible for a visit     Additional important information has been included with this packet, please make sure that you have read this information as it will better help you understand you illness/injury better.

## 2023-03-18 NOTE — ED PROVIDER NOTES
[address] ENCOUNTER          PHYSICIAN ASSISTANT NOTE     Date of evaluation: 3/18/2023    Chief Complaint     Dizziness (Pt hit head on corner of wall on Thursday night. Now feeling \"off balance\" and that he is spinning. Small abrasion above R eyebrow )    History of Present Illness     HPI: Sarina Stoddard is a 68 y.o. male with history of asthma, CAD, COPD, HLD who presents to the emergency department with head injury and dizziness. On Thursday patient turned his head quickly and struck his right eyebrow on the corner of a wall. He did not lose consciousness, fall to the ground and is not anticoagulated outside of a baby aspirin. Since then patient has been having intermittent episodes of room spinning sensation as well as feeling unsteady when he walks. He denies any speech changes, focal weakness. He has had similar symptoms in the past which has been attributed to labyrinthitis, notes that the symptoms have been more severe. Patient's episodes have been intermittent, he has not noticed any exacerbating factors. With the exception of the above, there are no aggravating or alleviating factors. Review of Systems     Review of Systems   Constitutional:  Negative for chills and fever. Respiratory:  Negative for shortness of breath. Cardiovascular:  Negative for chest pain. Neurological:  Positive for dizziness. Negative for seizures, weakness and headaches. As stated above, all other systems reviewed and are otherwise negative. Past Medical, Surgical, Family, and Social History     He has a past medical history of Acid reflux, Asthma, CAD (coronary artery disease), COPD (chronic obstructive pulmonary disease) ([facility] [address] Hyperlipidemia, Insomnia, and Vertigo. He has a past surgical history that includes Coronary artery bypass graft (2019); Prostate surgery (2017); Colonoscopy; Esophagus dilation; Inguinal hernia repair (Right);  Colonoscopy (N/A, 10/5/2022); and Esophagoscopy (N/A, 11/21/2022). His family history includes Coronary Art Dis in his brother; Diabetes in his mother; Heart Disease in his father. He reports that he quit smoking about 3 years ago. His smoking use included cigarettes. He has a 50.00 pack-year smoking history. He has never used smokeless tobacco. He reports that he does not currently use alcohol. He reports that he does not use drugs.     Medications     Discharge Medication List as of 3/18/2023  6:25 PM        CONTINUE these medications which have NOT CHANGED    Details   pantoprazole (PROTONIX) 40 MG tablet TAKE ONE TABLET BY MOUTH DAILY, Disp-90 tablet, R-3Normal      rosuvastatin (CRESTOR) 20 MG tablet TAKE ONE TABLET BY MOUTH DAILY, Disp-90 tablet, R-3Normal      metoprolol succinate (TOPROL XL) 25 MG extended release tablet TAKE ONE TABLET BY MOUTH DAILY, Disp-90 tablet, R-3Normal      triazolam (HALCION) 0.25 MG tablet TAKE ONE TABLET BY MOUTH ONCE NIGHTLY, Disp-60 tablet, R-0Normal      !! albuterol sulfate HFA (VENTOLIN HFA) 108 (90 Base) MCG/ACT inhaler Inhale 2 puffs into the lungs 4 times daily as needed for Wheezing, Disp-18 g, R-0Normal      clonazePAM (KLONOPIN) 0.5 MG tablet TAKE ONE TABLET BY MOUTH DAILY AS NEEDED FOR ANXIETY, Disp-30 tablet, R-0Normal      meclizine (ANTIVERT) 25 MG tablet TAKE ONE TABLET BY MOUTH THREE TIMES A DAY AS NEEDED FOR DIZZINESS, Disp-30 tablet, R-1Normal      sucralfate (CARAFATE) 1 GM tablet Take 1 tablet by mouth 4 times daily, Disp-120 tablet, R-2Normal      clindamycin (CLINDAGEL) 1 % gel APPLY TO AFFECTED AREA(S) DAILY AS NEEDED, Disp-30 g, R-3, Normal      dicyclomine (BENTYL) 20 MG tablet Take 1 tablet by mouth 3 times daily as needed (Abd c/o), Disp-90 tablet, R-1Normal      !! albuterol sulfate  (90 Base) MCG/ACT inhaler TAKE 2 PUFFS BY MOUTH EVERY 6 HOURS AS NEEDED FOR WHEEZE, Disp-18 each, R-1Normal      aspirin 81 MG EC tablet Take 81 mg by mouth dailyHistorical Med      Ascorbic Acid (VITAMIN C) 500 MG CAPS Take 1 capsule by mouth dailyHistorical Med      Multiple Vitamins-Minerals (MULTIVITAMIN ADULTS 50+) TABS Take by mouth dailyHistorical Med      Simethicone (GAS-X PO) Take by mouth as neededHistorical Med      acetaminophen (TYLENOL) 325 MG tablet Take 650 mg by mouth every 6 hours as needed for PainHistorical Med       !! - Potential duplicate medications found. Please discuss with provider. Allergies     He is allergic to adhesive tape. Physical Exam     INITIAL VITALS: BP: 137/78, Temp: 97.6 °F (36.4 °C), Heart Rate: 55, Resp: 16, SpO2: 98 %  Physical Exam  Vitals and nursing note reviewed. Constitutional:       General: He is not in acute distress. Appearance: Normal appearance. He is normal weight. He is not ill-appearing, toxic-appearing or diaphoretic. HENT:      Head: Normocephalic. Right Ear: External ear normal.      Left Ear: External ear normal.      Nose: Nose normal.      Mouth/Throat:      Mouth: Mucous membranes are moist.      Pharynx: Oropharynx is clear. Eyes:      Extraocular Movements: Extraocular movements intact. Conjunctiva/sclera: Conjunctivae normal.      Comments: Superficial well-healing abrasion that is vertical, above the right eyebrow. No gaping. No significant surrounding ecchymosis. No raccoon eyes. Neck:      Comments: No midline cervical spinal tenderness to palpation. Cardiovascular:      Rate and Rhythm: Normal rate. Pulses: Normal pulses. Pulmonary:      Effort: Pulmonary effort is normal. No respiratory distress. Musculoskeletal:         General: Normal range of motion. Cervical back: Normal range of motion. Skin:     General: Skin is warm and dry. Neurological:      General: No focal deficit present. Mental Status: He is alert. Mental status is at baseline. Comments: Clear speech, no dysarthria or aphasia. No neglect or visual field deficits. Equal shoulder shrug bilaterally.   No pronator drift. Equal strength in bilateral upper extremities. Normal sensation to light touch to bilateral aspects of face, upper extremities and lower extremities. Normal finger-to-nose test.  Able to raise and hold bilateral lower extremities off of the bed for over 5 seconds. Normal heel-to-shin testing. Able to ambulate independently without any ataxia or favoring either lower extremity. Psychiatric:         Mood and Affect: Mood normal.         Behavior: Behavior normal.       Diagnostic Results       RADIOLOGY:  CTA Head Neck W/Contrast   Final Result   1. No evidence for acute intracranial arterial process   2. Patent dural venous sinuses            CT angiography of the neck with contrast      TECHNIQUE: Collimated helical images are made from the skull base through the thoracic inlet after 75 mL of Isovue-370 intravenous contrast. CT performed on a current scanner low-dose x-ray techniques. MIP reformatted images acquired in separate    workstation. Findings CT angiography of the neck with contrast:      Common carotid artery, external carotid arteries patent. There is evidence of 45% stenosis involving the proximal left internal carotid artery, approximately 1 cm from its origin. The right common carotid artery, internal carotid artery, external carotid    arteries patent. Bilateral vertebral arteries are patent. Tongue base unremarkable. True cords are symmetric. Thyroid gland unremarkable in appearance. Lung apices unremarkable. IMPRESSION:   1. 45% mixed stenosis involving the proximal left internal carotid artery, approximately 1 cm from its origin   2. Patent right carotid arterial system and bilateral vertebral arteries. Specifically no dissection. CT Head W/O Contrast   Final Result      No evidence for acute intracranial process.  No bleed or shift          LABS:   Results for orders placed or performed during the hospital encounter of 03/18/23   CBC with Auto Differential   Result Value Ref Range    WBC 5.9 4.0 - 11.0 K/uL    RBC 4.18 (L) 4.20 - 5.90 M/uL    Hemoglobin 13.6 13.5 - 17.5 g/dL    Hematocrit 39.5 (L) 40.5 - 52.5 %    MCV 94.6 80.0 - 100.0 fL    MCH 32.5 26.0 - 34.0 pg    MCHC 34.3 31.0 - 36.0 g/dL    RDW 12.5 12.4 - 15.4 %    Platelets 636 235 - 978 K/uL    MPV 9.1 5.0 - 10.5 fL    Neutrophils % 66.7 %    Lymphocytes % 23.5 %    Monocytes % 6.6 %    Eosinophils % 2.9 %    Basophils % 0.3 %    Neutrophils Absolute 4.0 1.7 - 7.7 K/uL    Lymphocytes Absolute 1.4 1.0 - 5.1 K/uL    Monocytes Absolute 0.4 0.0 - 1.3 K/uL    Eosinophils Absolute 0.2 0.0 - 0.6 K/uL    Basophils Absolute 0.0 0.0 - 0.2 K/uL   BMP w/ Reflex to MG   Result Value Ref Range    Sodium 142 136 - 145 mmol/L    Potassium reflex Magnesium 4.5 3.5 - 5.1 mmol/L    Chloride 105 99 - 110 mmol/L    CO2 30 21 - 32 mmol/L    Anion Gap 7 3 - 16    Glucose 98 70 - 99 mg/dL    BUN 24 (H) 7 - 20 mg/dL    Creatinine 0.8 0.8 - 1.3 mg/dL    Est, Glom Filt Rate >60 >60    Calcium 9.3 8.3 - 10.6 mg/dL     RECENT VITALS:  BP: 137/78, Temp: 97.6 °F (36.4 °C), Heart Rate: 55, Resp: 16, SpO2: 98 %     Procedures     N/a    ED Course     Nursing Notes, Past Medical Hx,Past Surgical Hx, Social Hx, Allergies, and Family Hx were reviewed. The patient was given the following medications:  Orders Placed This Encounter   Medications    iopamidol (ISOVUE-370) 76 % injection 75 mL         CONSULTS:  None    MEDICAL DECISION MAKING / ASSESSMENT / PLAN     Vitals:    03/18/23 1610 03/18/23 1611 03/18/23 1701   BP: 137/78 137/78    Pulse: 55     Resp: 16     Temp: 97.6 °F (36.4 °C)     TempSrc: Oral     SpO2: 98% 98% 98%   Weight: 184 lb (83.5 kg)     Height: 6' (1.829 m)         Kirsten Welch is a 68 y.o. male who presents to the emergency department with dizziness. On Thursday patient turned his head abruptly, struck his forehead with a superficial abrasion to his right eyebrow.   He did not fall to the ground, lose consciousness and is not anticoagulated. Since then patient has been having episodic room spinning sensations with unsteadiness with walking. He has had similar symptoms in the past which has been attributed to lap and they do so he notes that the symptoms are more significant. He has not noticed any aggravating factors with these episodes. The patient has remained hemodynamically stable throughout their stay in the emergency department, and appears well overall. Patient is not currently symptomatic, has a completely nonfocal neurologic exam and able to ambulate independently without any difficulty here in the emergency department. Given patient's episodic vertigo and endorsed ataxia, though not appreciable on exam here, after head trauma a noncontrast CT scan will be performed of the patient's head. My attending also wanted to assess for underlying dissection given the rapid turn of his head and then progression of his symptoms so CT angiography of patient's head and neck will be obtained. Fortunately CT scan of patient's head and CTAs of head and neck did not show any acute findings as a source of the patient symptoms. Patient remained asymptomatic here without a focal neurologic deficit as well. Given patient's benign physical exam and waxing and waning symptoms with low suspicion for underlying stroke as etiology I do not feel that he requires admission to the hospital for MRI. Discussed with the patient was agreeable to this. He did have some stenosis of his ICA which was discussed with him, was encouraged to follow-up with his primary care provider regarding these findings as well as the symptoms. We also discussed strict return precautions and reasons to return to the emergency department which he is agreeable to. Patient was given the opportunity to ask questions and is agreeable to plan for discharge home.     Medical Decision Making  Amount and/or Complexity of Data Reviewed  Labs: ordered. Decision-making details documented in ED Course. Radiology: ordered. Risk  Prescription drug management. The patient was evaluated by myself and the ED Attending Physician, Dr. Earle Peña. All management and disposition plans were discussed and agreed upon. Clinical Impression     1. Dizziness    2. Injury of head, initial encounter        This note was dictated using voice-recognition software, which occasionally leads to inadvertent typographic errors.     Disposition     PATIENT REFERRED TO:  Kelvin Hewitt MD  [address] [place] [address]  [phone]    Schedule an appointment as soon as possible for a visit       DISCHARGE MEDICATIONS:  Discharge Medication List as of 3/18/2023  6:25 PM          DISPOSITION Decision To Discharge 03/18/2023 06:21:22 PM       Sid Santa, [address]  03/18/23 2028

## 2023-03-20 ENCOUNTER — TELEPHONE (OUTPATIENT)
Dept: INTERNAL MEDICINE CLINIC | Age: 73
End: 2023-03-20

## 2023-03-21 ENCOUNTER — OFFICE VISIT (OUTPATIENT)
Dept: INTERNAL MEDICINE CLINIC | Age: 73
End: 2023-03-21
Payer: MEDICARE

## 2023-03-21 VITALS
WEIGHT: 184 LBS | SYSTOLIC BLOOD PRESSURE: 122 MMHG | BODY MASS INDEX: 24.92 KG/M2 | HEIGHT: 72 IN | TEMPERATURE: 97.2 F | DIASTOLIC BLOOD PRESSURE: 80 MMHG

## 2023-03-21 DIAGNOSIS — R42 VERTIGO: Primary | ICD-10-CM

## 2023-03-21 PROCEDURE — 99213 OFFICE O/P EST LOW 20 MIN: CPT | Performed by: STUDENT IN AN ORGANIZED HEALTH CARE EDUCATION/TRAINING PROGRAM

## 2023-03-21 PROCEDURE — 1123F ACP DISCUSS/DSCN MKR DOCD: CPT | Performed by: STUDENT IN AN ORGANIZED HEALTH CARE EDUCATION/TRAINING PROGRAM

## 2023-03-21 RX ORDER — MECLIZINE HYDROCHLORIDE 25 MG/1
25 TABLET ORAL 3 TIMES DAILY PRN
Qty: 30 TABLET | Refills: 1 | Status: SHIPPED | OUTPATIENT
Start: 2023-03-21

## 2023-03-21 RX ORDER — MECLIZINE HYDROCHLORIDE 25 MG/1
25 TABLET ORAL 3 TIMES DAILY PRN
Qty: 30 TABLET | Refills: 1 | Status: SHIPPED | OUTPATIENT
Start: 2023-03-21 | End: 2023-03-21

## 2023-03-21 ASSESSMENT — PATIENT HEALTH QUESTIONNAIRE - PHQ9
SUM OF ALL RESPONSES TO PHQ QUESTIONS 1-9: 0
2. FEELING DOWN, DEPRESSED OR HOPELESS: 0
1. LITTLE INTEREST OR PLEASURE IN DOING THINGS: 0
SUM OF ALL RESPONSES TO PHQ9 QUESTIONS 1 & 2: 0

## 2023-03-21 ASSESSMENT — ENCOUNTER SYMPTOMS
ABDOMINAL DISTENTION: 0
CHEST TIGHTNESS: 0
STRIDOR: 0
NAUSEA: 0
COUGH: 0
SHORTNESS OF BREATH: 0
WHEEZING: 0
CONSTIPATION: 0
TROUBLE SWALLOWING: 0
ABDOMINAL PAIN: 0
CHOKING: 0
VOMITING: 0
APNEA: 0
VOICE CHANGE: 0
PHOTOPHOBIA: 0
DIARRHEA: 0

## 2023-03-21 NOTE — ASSESSMENT & PLAN NOTE
Has a long history of intermittent peripheral vertigo before in the past.  Symptoms usually improve with Antivert as needed.   He also saw neurology before in the past and was treated with Klonopin.  -Orthostatic check in the office negative  -continue Antivert and Klonopin as needed  -MRI brain for further evaluation

## 2023-03-21 NOTE — PROGRESS NOTES
kg).    Physical Exam  Vitals and nursing note reviewed. Constitutional:       General: He is not in acute distress. Appearance: He is well-developed. He is not diaphoretic. HENT:      Head: Normocephalic and atraumatic. Comments: [place] Hallpike maneuver provoked lightheadedness/dizziness with head movement to the right. no nystagmus noted  Eyes:      General: No scleral icterus. Conjunctiva/sclera: Conjunctivae normal.      Pupils: Pupils are equal, round, and reactive to light. Cardiovascular:      Rate and Rhythm: Normal rate and regular rhythm. Heart sounds: Normal heart sounds. No murmur heard. No friction rub. No gallop. Pulmonary:      Effort: Pulmonary effort is normal. No respiratory distress. Breath sounds: Normal breath sounds. No wheezing or rales. Chest:      Chest wall: No tenderness. Abdominal:      General: Bowel sounds are normal. There is no distension. Palpations: Abdomen is soft. Tenderness: There is no abdominal tenderness. Musculoskeletal:         General: No tenderness or deformity. Normal range of motion. Cervical back: Normal range of motion and neck supple. Skin:     General: Skin is warm and dry. Neurological:      General: No focal deficit present. Mental Status: He is alert and oriented to person, place, and time. Mental status is at baseline. Cranial Nerves: No cranial nerve deficit. Sensory: No sensory deficit. Motor: No weakness. Coordination: Coordination normal.      Gait: Gait normal.      Deep Tendon Reflexes: Reflexes normal.   Psychiatric:         Behavior: Behavior normal.         Kirsten Crystal MD    This dictation was generated by voice recognition computer software. Although all attempts are made to edit the dictation for accuracy, there may be errors in the transcription that are not intended.

## 2023-03-29 ENCOUNTER — OFFICE VISIT (OUTPATIENT)
Dept: INTERNAL MEDICINE CLINIC | Age: 73
End: 2023-03-29
Payer: MEDICARE

## 2023-03-29 VITALS
TEMPERATURE: 97.9 F | SYSTOLIC BLOOD PRESSURE: 130 MMHG | BODY MASS INDEX: 24.52 KG/M2 | WEIGHT: 181 LBS | HEIGHT: 72 IN | HEART RATE: 51 BPM | DIASTOLIC BLOOD PRESSURE: 80 MMHG | OXYGEN SATURATION: 98 %

## 2023-03-29 DIAGNOSIS — F41.9 ANXIETY: ICD-10-CM

## 2023-03-29 DIAGNOSIS — R42 VERTIGO: Primary | ICD-10-CM

## 2023-03-29 PROCEDURE — 1123F ACP DISCUSS/DSCN MKR DOCD: CPT | Performed by: HOSPITALIST

## 2023-03-29 PROCEDURE — 99214 OFFICE O/P EST MOD 30 MIN: CPT | Performed by: HOSPITALIST

## 2023-03-29 RX ORDER — CLONAZEPAM 0.5 MG/1
TABLET ORAL
Qty: 30 TABLET | Refills: 0 | Status: SHIPPED | OUTPATIENT
Start: 2023-03-29 | End: 2023-03-29

## 2023-03-29 ASSESSMENT — ENCOUNTER SYMPTOMS
DIARRHEA: 0
BACK PAIN: 0
NAUSEA: 0
SHORTNESS OF BREATH: 0
CHEST TIGHTNESS: 0
VOICE CHANGE: 0
WHEEZING: 0
SINUS PAIN: 0
VOMITING: 0
TROUBLE SWALLOWING: 0
ABDOMINAL PAIN: 0
CONSTIPATION: 0
SORE THROAT: 0
SINUS PRESSURE: 0
COUGH: 0
BLOOD IN STOOL: 0
ABDOMINAL DISTENTION: 0

## 2023-03-29 NOTE — PROGRESS NOTES
Insomnia     Vertigo      Patient Active Problem List   Diagnosis    Coronary artery disease involving native coronary artery    Hyperlipidemia LDL goal <70    Gastroesophageal reflux disease without esophagitis    Auditory vertigo involving both ears    Primary insomnia    Benign prostatic hyperplasia with lower urinary tract symptoms    Vertigo    Pulmonary emphysema (HCC)    Allergic rhinitis    Coronary artery disease involving native coronary artery of native heart with angina pectoris (HCC)    Left foot pain    Migraine with aura, not intractable    Lower abdominal pain    Right adrenal mass (HCC)    SOB (shortness of breath)     Past Surgical History:   Procedure Laterality Date    COLONOSCOPY      05/05/2017, diverticulosis, repeat 5/15/2026 Dr. Aidan Zaldivar    COLONOSCOPY N/A 10/5/2022    COLONOSCOPY POLYPECTOMY SNARE/COLD BIOPSY performed by Vianey Laurent MD at [facility]  2019    2 Vessels     ESOPHAGEAL DILATATION      ESOPHAGOSCOPY N/A 11/21/2022    ESOPHAGOSCOPY BIOPSY performed by Vianey Laurent MD at [place] [address] Right     PROSTATE SURGERY  2017    TURP     Social History     Socioeconomic History    Marital status: Single     Spouse name: Not on file    Number of children: Not on file    Years of education: Not on file    Highest education level: Not on file   Occupational History    Not on file   Tobacco Use    Smoking status: Former     Packs/day: 1.00     Years: 50.00     Pack years: 50.00     Types: Cigarettes     Quit date: 08/2019     Years since quitting: 3.6    Smokeless tobacco: Never   Vaping Use    Vaping Use: Never used   Substance and Sexual Activity    Alcohol use: Not Currently     Comment: Rarely    Drug use: Never    Sexual activity: Not on file   Other Topics Concern    Not on file   Social History Narrative    Not on file     Social Determinants of Health     Financial Resource Strain: Low Risk

## 2023-04-05 ENCOUNTER — HOSPITAL ENCOUNTER (OUTPATIENT)
Dept: MRI IMAGING | Age: 73
Discharge: HOME | End: 2023-04-05
Payer: MEDICARE

## 2023-04-05 DIAGNOSIS — R42 VERTIGO: ICD-10-CM

## 2023-04-05 PROCEDURE — 70551 MRI BRAIN STEM W/O DYE: CPT

## 2023-04-05 NOTE — RESULT ENCOUNTER NOTE
Results reviewed. There is no tumor, bleed, stroke or any other finding to explain vertigo. Otherwise he does have some mild \"aging of the brain\". We can discuss this more at his upcoming [facility] visit.

## 2023-04-24 DIAGNOSIS — F51.01 PRIMARY INSOMNIA: ICD-10-CM

## 2023-05-24 ENCOUNTER — HOSPITAL ENCOUNTER (EMERGENCY)
Age: 73
Discharge: HOME | End: 2023-05-24
Attending: EMERGENCY MEDICINE
Payer: MEDICARE

## 2023-05-24 ENCOUNTER — APPOINTMENT (OUTPATIENT)
Dept: GENERAL RADIOLOGY | Age: 73
End: 2023-05-24
Payer: MEDICARE

## 2023-05-24 VITALS
TEMPERATURE: 97.9 F | RESPIRATION RATE: 19 BRPM | HEART RATE: 62 BPM | BODY MASS INDEX: 24.75 KG/M2 | SYSTOLIC BLOOD PRESSURE: 131 MMHG | DIASTOLIC BLOOD PRESSURE: 77 MMHG | WEIGHT: 182.5 LBS | OXYGEN SATURATION: 95 %

## 2023-05-24 DIAGNOSIS — R06.00 DYSPNEA, UNSPECIFIED TYPE: Primary | ICD-10-CM

## 2023-05-24 LAB
ANION GAP SERPL CALCULATED.3IONS-SCNC: 10 MMOL/L (ref 3–16)
BASE EXCESS BLDV CALC-SCNC: 4.4 MMOL/L (ref -2–3)
BASOPHILS # BLD: 0 K/UL (ref 0–0.2)
BASOPHILS NFR BLD: 0.2 %
BUN SERPL-MCNC: 19 MG/DL (ref 7–20)
CALCIUM SERPL-MCNC: 9.3 MG/DL (ref 8.3–10.6)
CHLORIDE SERPL-SCNC: 103 MMOL/L (ref 99–110)
CO2 BLDV-SCNC: 30 MMOL/L
CO2 SERPL-SCNC: 26 MMOL/L (ref 21–32)
COHGB MFR BLDV: 1 % (ref 0–1.5)
CREAT SERPL-MCNC: 0.9 MG/DL (ref 0.8–1.3)
DEPRECATED RDW RBC AUTO: 12 % (ref 12.4–15.4)
DO-HGB MFR BLDV: 27.2 %
EOSINOPHIL # BLD: 0.1 K/UL (ref 0–0.6)
EOSINOPHIL NFR BLD: 1.3 %
GFR SERPLBLD CREATININE-BSD FMLA CKD-EPI: >60 ML/MIN/{1.73_M2}
GLUCOSE SERPL-MCNC: 120 MG/DL (ref 70–99)
HCO3 BLDV-SCNC: 28.6 MMOL/L (ref 24–28)
HCT VFR BLD AUTO: 39.8 % (ref 40.5–52.5)
HGB BLD-MCNC: 13.7 G/DL (ref 13.5–17.5)
LYMPHOCYTES # BLD: 1.3 K/UL (ref 1–5.1)
LYMPHOCYTES NFR BLD: 18.3 %
MAGNESIUM SERPL-MCNC: 1.8 MG/DL (ref 1.8–2.4)
MCH RBC QN AUTO: 32.4 PG (ref 26–34)
MCHC RBC AUTO-ENTMCNC: 34.4 G/DL (ref 31–36)
MCV RBC AUTO: 94 FL (ref 80–100)
METHGB MFR BLDV: 0 % (ref 0–1.5)
MONOCYTES # BLD: 0.4 K/UL (ref 0–1.3)
MONOCYTES NFR BLD: 5.7 %
NEUTROPHILS # BLD: 5.4 K/UL (ref 1.7–7.7)
NEUTROPHILS NFR BLD: 74.5 %
NT-PROBNP SERPL-MCNC: 233 PG/ML (ref 0–124)
PCO2 BLDV: 40.2 MMHG (ref 41–51)
PH BLDV: 7.46 [PH] (ref 7.35–7.45)
PLATELET # BLD AUTO: 195 K/UL (ref 135–450)
PMV BLD AUTO: 9.2 FL (ref 5–10.5)
PO2 BLDV: 34.8 MMHG (ref 25–40)
POTASSIUM SERPL-SCNC: 3.4 MMOL/L (ref 3.5–5.1)
RBC # BLD AUTO: 4.24 M/UL (ref 4.2–5.9)
SAO2 % BLDV: 73 %
SODIUM SERPL-SCNC: 139 MMOL/L (ref 136–145)
TROPONIN, HIGH SENSITIVITY: 17 NG/L (ref 0–22)
TROPONIN, HIGH SENSITIVITY: 18 NG/L (ref 0–22)
WBC # BLD AUTO: 7.3 K/UL (ref 4–11)

## 2023-05-24 PROCEDURE — 71046 X-RAY EXAM CHEST 2 VIEWS: CPT

## 2023-05-24 PROCEDURE — 93005 ELECTROCARDIOGRAM TRACING: CPT

## 2023-05-24 PROCEDURE — 80048 BASIC METABOLIC PNL TOTAL CA: CPT

## 2023-05-24 PROCEDURE — 99285 EMERGENCY DEPT VISIT HI MDM: CPT

## 2023-05-24 PROCEDURE — 84484 ASSAY OF TROPONIN QUANT: CPT

## 2023-05-24 PROCEDURE — 85025 COMPLETE CBC W/AUTO DIFF WBC: CPT

## 2023-05-24 PROCEDURE — 36415 COLL VENOUS BLD VENIPUNCTURE: CPT

## 2023-05-24 PROCEDURE — 83880 ASSAY OF NATRIURETIC PEPTIDE: CPT

## 2023-05-24 PROCEDURE — 82803 BLOOD GASES ANY COMBINATION: CPT

## 2023-05-24 PROCEDURE — 83735 ASSAY OF MAGNESIUM: CPT

## 2023-05-24 ASSESSMENT — ENCOUNTER SYMPTOMS
GASTROINTESTINAL NEGATIVE: 1
SHORTNESS OF BREATH: 1
EYES NEGATIVE: 1

## 2023-05-25 LAB
EKG ATRIAL RATE: 64 BPM
EKG DIAGNOSIS: NORMAL
EKG P AXIS: 57 DEGREES
EKG P-R INTERVAL: 168 MS
EKG Q-T INTERVAL: 408 MS
EKG QRS DURATION: 96 MS
EKG QTC CALCULATION (BAZETT): 420 MS
EKG R AXIS: 34 DEGREES
EKG T AXIS: 23 DEGREES
EKG VENTRICULAR RATE: 64 BPM

## 2023-05-25 NOTE — ED PROVIDER NOTES
ED Attending Attestation Note     Date of evaluation: 5/24/2023    This patient was seen by the resident. I have seen and examined the patient, agree with the workup, evaluation, management and diagnosis. The care plan has been discussed. I have reviewed the ECG and concur with the resident's interpretation. My assessment reveals a slender, generally well-appearing gentleman, in no acute distress. Patient has a history of coronary artery disease status post CABG in 2019, which was performed in a different state. He states that he has had intermittent, waxing and waning, mostly positional chest discomfort since the time of his surgery, and is somewhat frustrated that it is never entirely resolved. Since that time, he has also had intermittent episodes of dyspnea. Tonight, the patient states that he had been feeling well all day. He was lying on his chest for a bit of time, then transition to to seated on the couch, watching television, and began to notice an uncomfortable sensation that he feels more deeply that he felt that he should. This was associated with a similar chest discomfort to what he has experienced before. On examination, he has a mild chest wall discomfort palpation over the sternum. No crepitus or step-offs. Lungs are clear. Cardiovascular examination is reassuring. He has normal vital signs.          Wilda Schilder, MD  05/25/23 1023

## 2023-05-25 NOTE — DISCHARGE INSTRUCTIONS
Please follow-up with your primary care provider in 1 week to make sure your symptoms are improving. Please return the emergency department with new or worsening chest pain, difficulties with breathing, or passing out.

## 2023-05-25 NOTE — ED PROVIDER NOTES
[id number]          EM RESIDENT NOTE       Date of evaluation: 5/24/2023    Chief Complaint     Shortness of Breath (Pt. Presents to ED via EMS from home with c/o SOB and \"heavy breathing\" since 1630. EMS states patient was 90% on room air after ambulation to the ambulance. )      of Present Illness     Amy Duff is a 68 y.o. male with past medical history of COPD, CAD s/p CABG, hyperlipidemia and GERD who presents to the emergency department with shortness of breath. Patient states that he was sitting on the couch around 4:00 this afternoon when he felt like he was taking deeper breaths than he should. This resolved just prior to my evaluation. He denies any associated chest pain, lightheadedness, wheezing, or stridor. He states he is never had anything like this before, but has had continued chest pain from his surgery and does not have chest pain at this time. He also denies any fevers, cough, or sputum production. Review of Systems     Review of Systems   Constitutional: Negative. HENT: Negative. Eyes: Negative. Respiratory:  Positive for shortness of breath. Cardiovascular: Negative. Gastrointestinal: Negative. Genitourinary: Negative. Musculoskeletal: Negative. Neurological: Negative. Past Medical, Surgical, Family, and Social History     He has a past medical history of Acid reflux, Asthma, CAD (coronary artery disease), COPD (chronic obstructive pulmonary disease) ([address] Hyperlipidemia, Insomnia, and Vertigo. He has a past surgical history that includes Coronary artery bypass graft (2019); Prostate surgery (2017); Colonoscopy; Esophagus dilation; Inguinal hernia repair (Right); Colonoscopy (N/A, 10/5/2022); and Esophagoscopy (N/A, 11/21/2022). His family history includes Coronary Art Dis in his brother; Diabetes in his mother; Heart Disease in his father. He reports that he quit smoking about 3 years ago.  His smoking use

## 2023-05-26 ENCOUNTER — CARE COORDINATION (OUTPATIENT)
Dept: CARE COORDINATION | Age: 73
End: 2023-05-26

## 2023-05-29 SDOH — HEALTH STABILITY: PHYSICAL HEALTH: ON AVERAGE, HOW MANY DAYS PER WEEK DO YOU ENGAGE IN MODERATE TO STRENUOUS EXERCISE (LIKE A BRISK WALK)?: 4 DAYS

## 2023-05-29 SDOH — HEALTH STABILITY: PHYSICAL HEALTH: ON AVERAGE, HOW MANY MINUTES DO YOU ENGAGE IN EXERCISE AT THIS LEVEL?: 30 MIN

## 2023-05-29 ASSESSMENT — LIFESTYLE VARIABLES
HOW OFTEN DO YOU HAVE A DRINK CONTAINING ALCOHOL: NEVER
HOW OFTEN DO YOU HAVE A DRINK CONTAINING ALCOHOL: 1
HOW MANY STANDARD DRINKS CONTAINING ALCOHOL DO YOU HAVE ON A TYPICAL DAY: PATIENT DOES NOT DRINK
HOW OFTEN DO YOU HAVE SIX OR MORE DRINKS ON ONE OCCASION: 1
HOW MANY STANDARD DRINKS CONTAINING ALCOHOL DO YOU HAVE ON A TYPICAL DAY: 0

## 2023-05-29 ASSESSMENT — PATIENT HEALTH QUESTIONNAIRE - PHQ9
SUM OF ALL RESPONSES TO PHQ QUESTIONS 1-9: 1
1. LITTLE INTEREST OR PLEASURE IN DOING THINGS: 1
SUM OF ALL RESPONSES TO PHQ9 QUESTIONS 1 & 2: 1
2. FEELING DOWN, DEPRESSED OR HOPELESS: 0
SUM OF ALL RESPONSES TO PHQ QUESTIONS 1-9: 1

## 2023-05-30 ENCOUNTER — CARE COORDINATION (OUTPATIENT)
Dept: CARE COORDINATION | Age: 73
End: 2023-05-30

## 2023-05-30 SDOH — ECONOMIC STABILITY: INCOME INSECURITY: IN THE LAST 12 MONTHS, WAS THERE A TIME WHEN YOU WERE NOT ABLE TO PAY THE MORTGAGE OR RENT ON TIME?: NO

## 2023-05-30 SDOH — HEALTH STABILITY: PHYSICAL HEALTH: ON AVERAGE, HOW MANY MINUTES DO YOU ENGAGE IN EXERCISE AT THIS LEVEL?: 60 MIN

## 2023-05-30 SDOH — ECONOMIC STABILITY: HOUSING INSECURITY: IN THE LAST 12 MONTHS, HOW MANY PLACES HAVE YOU LIVED?: 1

## 2023-05-30 SDOH — HEALTH STABILITY: PHYSICAL HEALTH: ON AVERAGE, HOW MANY DAYS PER WEEK DO YOU ENGAGE IN MODERATE TO STRENUOUS EXERCISE (LIKE A BRISK WALK)?: 4 DAYS

## 2023-05-30 SDOH — ECONOMIC STABILITY: TRANSPORTATION INSECURITY
IN THE PAST 12 MONTHS, HAS THE LACK OF TRANSPORTATION KEPT YOU FROM MEDICAL APPOINTMENTS OR FROM GETTING MEDICATIONS?: NO

## 2023-05-30 ASSESSMENT — SOCIAL DETERMINANTS OF HEALTH (SDOH)
HOW OFTEN DO YOU GET TOGETHER WITH FRIENDS OR RELATIVES?: MORE THAN THREE TIMES A WEEK
HOW OFTEN DO YOU ATTEND CHURCH OR RELIGIOUS SERVICES?: MORE THAN 4 TIMES PER YEAR
HOW OFTEN DO YOU ATTENT MEETINGS OF THE CLUB OR ORGANIZATION YOU BELONG TO?: 1 TO 4 TIMES PER YEAR
IN A TYPICAL WEEK, HOW MANY TIMES DO YOU TALK ON THE PHONE WITH FAMILY, FRIENDS, OR NEIGHBORS?: MORE THAN THREE TIMES A WEEK
DO YOU BELONG TO ANY CLUBS OR ORGANIZATIONS SUCH AS CHURCH GROUPS UNIONS, FRATERNAL OR ATHLETIC GROUPS, OR SCHOOL GROUPS?: NO

## 2023-05-30 NOTE — CARE COORDINATION
Ambulatory Care Coordination  ED Follow up Call    Reason for ED visit:  [facility] on 5/24/23 for SOB  Status:     significantly improved    Did you call your PCP prior to going to the ED? No      Did you receive a discharge instructions from the Emergency Room? Yes  Review of Instructions:     Understands what to report/when to return?:  Yes   Understands discharge instructions?:  Yes   Following discharge instructions?:  Yes       Are there any new complaints of pain? No  New Pain Meds? No    Constipation prophylaxis needed? No    If you have a wound is the dressing clean, dry, and intact? N/A  Understands wound care regimen? N/A    Are there any other complaints/concerns that you wish to tell your provider? ACM/RN made outreach and spoke to [facility] today. [facility] has no C/O of chest pain or SOB at this time. Reviewed AVS in detail from E/R with [facility], all questions answered. Med rec complete, PCP F/U apt made for 5/31/23 . Reviewed all upcoming appointments. No other concerns at this time. [facility] very pleasant and engaged. In agreement for care coordination and follow up calls as well. FU appts/Provider:    Future Appointments   Date Time Provider Adán Sanz   6/1/2023  2:00 PM MD ILANA Severino [address] [facility] [place] [facility]   7[facility]12/2023  [facility]   9/12/2023 10:30 AM MD ILANA Severino [address] [facility] [place] - [facility]           New Medications?:   No      Medication Reconciliation by phone - Yes  Understands Medications? Yes  Taking Medications? Yes  Can you swallow your pills? Yes    Any further needs in the home i.e. Equipment?   No    Link to services in community?:  No   Which services:  N/A

## 2023-06-01 ENCOUNTER — OFFICE VISIT (OUTPATIENT)
Dept: INTERNAL MEDICINE CLINIC | Age: 73
End: 2023-06-01
Payer: MEDICARE

## 2023-06-01 VITALS
HEART RATE: 57 BPM | DIASTOLIC BLOOD PRESSURE: 76 MMHG | BODY MASS INDEX: 23.84 KG/M2 | HEIGHT: 72 IN | TEMPERATURE: 99.7 F | SYSTOLIC BLOOD PRESSURE: 132 MMHG | WEIGHT: 176 LBS | OXYGEN SATURATION: 99 %

## 2023-06-01 DIAGNOSIS — Z00.00 MEDICARE ANNUAL WELLNESS VISIT, SUBSEQUENT: Primary | ICD-10-CM

## 2023-06-01 DIAGNOSIS — R42 VERTIGO: ICD-10-CM

## 2023-06-01 DIAGNOSIS — R06.00 DYSPNEA, UNSPECIFIED TYPE: ICD-10-CM

## 2023-06-01 DIAGNOSIS — F41.9 ANXIETY: ICD-10-CM

## 2023-06-01 PROCEDURE — G0439 PPPS, SUBSEQ VISIT: HCPCS | Performed by: HOSPITALIST

## 2023-06-01 PROCEDURE — 1123F ACP DISCUSS/DSCN MKR DOCD: CPT | Performed by: HOSPITALIST

## 2023-06-01 RX ORDER — ALBUTEROL SULFATE 90 UG/1
2 AEROSOL, METERED RESPIRATORY (INHALATION) 4 TIMES DAILY PRN
Qty: 18 G | Refills: 0 | Status: SHIPPED | OUTPATIENT
Start: 2023-06-01

## 2023-06-01 RX ORDER — CLONAZEPAM 0.5 MG/1
TABLET ORAL
Qty: 30 TABLET | Refills: 0 | Status: SHIPPED | OUTPATIENT
Start: 2023-06-01 | End: 2023-08-04

## 2023-06-01 NOTE — PROGRESS NOTES
Due to s/sx today, STAT CT was ordered. Pt was offered an appt for today in [address] [place] [address] and tomorrow at [place]. Pt declined. States that he will call to sched an appt for next week.
aspirin 81 MG EC tablet Take 1 tablet by mouth daily Yes Historical Provider, MD   Ascorbic Acid (VITAMIN C) 500 MG CAPS Take 1 capsule by mouth daily Yes Historical Provider, MD   Multiple Vitamins-Minerals (MULTIVITAMIN ADULTS 50+) TABS Take by mouth daily Yes Historical Provider, MD   Simethicone (GAS-X PO) Take by mouth as needed Yes Historical Provider, MD   acetaminophen (TYLENOL) 325 MG tablet Take 2 tablets by mouth every 6 hours as needed for Pain Yes Historical Provider, MD [place] (Including outside providers/suppliers regularly involved in providing care):   Patient Care Team:  Desiree Oneal MD as PCP - General (Internal Medicine)  Desiree Oneal MD as PCP - Empaneled Provider  Buster Malloy DPM as Consulting Physician (Podiatry)  Roz Bray RN as Ambulatory Care Manager     Reviewed and updated this visit:  Tobacco  Allergies  Meds  Med Hx  Surg Hx  Soc Hx  Fam Hx

## 2023-06-02 ENCOUNTER — HOSPITAL ENCOUNTER (OUTPATIENT)
Dept: CT IMAGING | Age: 73
Discharge: HOME | End: 2023-06-02
Attending: HOSPITALIST
Payer: MEDICARE

## 2023-06-02 DIAGNOSIS — R06.00 DYSPNEA, UNSPECIFIED TYPE: ICD-10-CM

## 2023-06-02 PROCEDURE — 6360000004 HC RX CONTRAST MEDICATION: Performed by: HOSPITALIST

## 2023-06-02 PROCEDURE — 71260 CT THORAX DX C+: CPT

## 2023-06-02 RX ADMIN — IOPAMIDOL 75 ML: 755 INJECTION, SOLUTION INTRAVENOUS at 14:09

## 2023-06-05 DIAGNOSIS — F41.9 ANXIETY: ICD-10-CM

## 2023-06-05 DIAGNOSIS — R42 VERTIGO: ICD-10-CM

## 2023-06-06 RX ORDER — CLONAZEPAM 0.5 MG/1
TABLET ORAL
Qty: 30 TABLET | OUTPATIENT
Start: 2023-06-06

## 2023-06-08 DIAGNOSIS — R06.00 DYSPNEA, UNSPECIFIED TYPE: Primary | ICD-10-CM

## 2023-06-09 ENCOUNTER — CARE COORDINATION (OUTPATIENT)
Dept: CARE COORDINATION | Age: 73
End: 2023-06-09

## 2023-06-09 ENCOUNTER — TELEPHONE (OUTPATIENT)
Dept: PULMONOLOGY | Age: 73
End: 2023-06-09

## 2023-06-09 DIAGNOSIS — R06.02 SOB (SHORTNESS OF BREATH): Primary | ICD-10-CM

## 2023-06-09 NOTE — CARE COORDINATION
ACM attempted outreach to patient. No answer, voice mail left with contact information and requesting a return call. Janay ZAMBRANO, RN  Ambulatory Care Manager  [email or web address]  [phone]

## 2023-06-09 NOTE — TELEPHONE ENCOUNTER
MEDICARE WELLNESS VISIT + NOTE    CHIEF COMPLAINT:  Daniela Jordan presents for her Subsequent Annual Medicare Wellness Visit.   Her additional complaints or concerns are addressed below.      Patient Care Team:  Nica Hernandez MD as PCP - General (Family Practice)        Patient Active Problem List   Diagnosis   • JASPAL (generalized anxiety disorder)   • Dyslipidemia   • Seborrheic keratosis   • Essential hypertension   • Agatston coronary artery calcium score less than 100, score equals 0   • Family history of coronary artery disease   • Osteopenia of neck of left femur   • Dilatation of pulmonic artery (CMS/HCC)   • White coat syndrome with diagnosis of hypertension         Past Medical History:   Diagnosis Date   • Anxiety    • Dyslipidemia    • Essential hypertension    • H/O colonoscopy 2010    Dr Roldan         Past Surgical History:   Procedure Laterality Date   • Colonoscopy  2010    Dr Moya   • No past surgeries           Social History     Tobacco Use   • Smoking status: Never Smoker   • Smokeless tobacco: Never Used   Vaping Use   • Vaping Use: never used   Substance Use Topics   • Alcohol use: Yes     Alcohol/week: 3.0 standard drinks     Types: 3 Glasses of wine per week     Comment: 3 glasses of wine daily;   • Drug use: No     Family History   Problem Relation Age of Onset   • Heart disease Father    • Myocardial Infarction Father          at 53, MI,  suddenly   • Substance Abuse Father         tobacco/alcohol   • High blood pressure Mother    • Kidney disease Mother    • Other Mother          at 89   • Heart disease Mother         CHF   • Other Sister         ALS,  at 54         Current Outpatient Medications   Medication Sig Dispense Refill   • triamterene-hydroCHLOROthiazide (MAXZIDE-25) 37.5-25 MG per tablet Take 1 tablet by mouth daily. 90 tablet 1   • metoPROLOL succinate (TOPROL-XL) 25 MG 24 hr tablet Take 1 tablet by mouth at bedtime. 90 tablet 1   • calcium  Reg by Dr Laureen Brand for Dyspnea. Pt needs PFTs please sign pending order. Pt did have CT chest 6/1/23. citrate (CALCITRATE) 950 MG tablet Take 1 tablet by mouth daily.     • Ascorbic Acid (VITAMIN C) 1000 MG tablet Take 1,000 mg by mouth daily.     • cholecalciferol (VITAMIN D3) 1000 UNITS tablet Take 4,000 Units by mouth daily. Pt takes 2 tabs daily.      • Omega-3 Fatty Acids (FISH OIL PO) Take 1 tablet by mouth daily. Pt takes one daily; strength unknown.      • Multiple Vitamins-Minerals (CENTRUM PO) Take 1 tablet by mouth daily. Pt takes one daily.      • UNKNOWN TO PATIENT Tumeric takes on tablet by mouth daily       No current facility-administered medications for this visit.        The following items on the Medicare Health Risk Assessment were found to be positive  7a.) Have you had a fall in the past year?: Yes         Vision and Hearing screens:    Hearing Screening    125Hz 250Hz 500Hz 1000Hz 2000Hz 3000Hz 4000Hz 6000Hz 8000Hz   Right ear:  Pass Pass Pass Pass  Fail     Left ear:  Pass Pass Pass Pass  Pass         Advance Directive:   The patient has the following documents:  Power of  for Health Care    Cognitive/Functional Status: no evidence of cognitive dysfunction by direct observation    Opioid Review: Daniela is not taking opioid medications.    Recent PHQ 2/9 Score:    PHQ 2:  Date Adult PHQ 2 Score Adult PHQ 2 Interpretation   1/17/2022 0 No further screening needed       PHQ 9:       DEPRESSION ASSESSMENT/PLAN:  Depression screening is negative no further plan needed.     Body mass index is 32.2 kg/m².    BMI ASSESSMENT/PLAN:  Patient is obese.    30 minutes of physical activity a day         See Patient Instructions section.   Return in about 6 months (around 7/17/2022) for blood pressure recheck.      OUTPATIENT PROGRESS NOTE    Subjective   Chief Complaint Medicare Wellness Visit      --HTN:   Home BP running 120 average at home   Is taking a daily aspirin: No   Is tolerating medication without side effects:  Yes   Is not experiencing chest pain, shortness of breath or headaches.   Is  not experiencing lower extremity swelling.   Does patient smoke: No; smoking cessation advised: Yes   Prior EKG or stress test?  Yes   Lab Results       Component                Value               Date                       BUN                      17                  03/18/2021                 BUN                      14                  02/08/2019                 GLUCOSE                  90                  03/18/2021                 GLUCOSE                  94                  02/08/2019                 CO2                      30                  03/18/2021                 CO2                      26                  02/08/2019             Lab Results       Component                Value               Date                       HDL                      119                 12/10/2020                 HDL                      126                 02/08/2019                        Does patient exercise?Yes  Was counseling given:Yes        Does the patient have the ability to self monitor/manage and comply to their treatment plan:Yes    Compliance to treatment plan: Yes    Patient's barriers to care plan: No    Medication adherence:  Patient reports adherence to dosing schedules: Yes  Side effects of medication: No    Cholesterol (mg/dL)       Date                     Value                 12/10/2020               236 (H)          ----------  HDL (mg/dL)       Date                     Value                 12/10/2020               119              ----------  Cholesterol/ HDL Ratio (no units)       Date                     Value                 12/10/2020               2.0              ----------  Triglycerides (mg/dL)       Date                     Value                 12/10/2020               74               ----------  LDL (mg/dL)       Date                     Value                 12/10/2020               102              ----------  Calcium score 0 in 2013.  No meds    Knee --left side.  Doing fairly well.   Not painful, and able to increase activity. Using cbd oil for this. Using regularly since Fito.               Medications  Medications were reviewed and updated today.    Histories  I have personally reviewed and updated the patient's past medical, past surgical, family and social histories during today's visit.    Review of Systems   Constitutional: Negative for activity change, chills, diaphoresis and fever.   HENT: Negative.    Eyes: Negative.    Respiratory: Negative.    Cardiovascular: Negative.    Gastrointestinal: Negative.    Endocrine: Negative.    Genitourinary: Negative.    Musculoskeletal: Negative.    Skin: Negative.    Allergic/Immunologic: Negative.    Neurological: Negative.    Hematological: Negative.    Psychiatric/Behavioral: Negative.        Objective   Visit Vitals  /82   Pulse 75   Temp 97.1 °F (36.2 °C)   Resp 18   Ht 5' 1.5\" (1.562 m)   Wt 78.6 kg (173 lb 3.2 oz)   SpO2 98%   BMI 32.20 kg/m²     Physical Exam  Vitals and nursing note reviewed.   Constitutional:       General: She is not in acute distress.     Appearance: She is well-developed. She is not diaphoretic.   HENT:      Head: Normocephalic and atraumatic.      Right Ear: External ear normal.      Left Ear: External ear normal.   Eyes:      General: No scleral icterus.        Right eye: No discharge.         Left eye: No discharge.      Conjunctiva/sclera: Conjunctivae normal.      Pupils: Pupils are equal, round, and reactive to light.   Neck:      Thyroid: No thyromegaly.   Cardiovascular:      Rate and Rhythm: Normal rate and regular rhythm.      Heart sounds: Normal heart sounds. No murmur heard.      Pulmonary:      Effort: Pulmonary effort is normal. No respiratory distress.      Breath sounds: Normal breath sounds. No wheezing.   Chest:   Breasts: Breasts are symmetrical.      Right: Normal. No swelling, bleeding, inverted nipple, mass, nipple discharge, skin change or tenderness.      Left: Normal. No swelling,  bleeding, inverted nipple, mass, nipple discharge, skin change or tenderness.       Abdominal:      General: Bowel sounds are normal. There is no distension.      Palpations: Abdomen is soft.      Tenderness: There is no abdominal tenderness. There is no guarding or rebound.   Musculoskeletal:         General: Normal range of motion.      Cervical back: Normal range of motion and neck supple.   Lymphadenopathy:      Cervical: No cervical adenopathy.   Skin:     General: Skin is warm and dry.      Findings: No erythema.   Neurological:      Mental Status: She is alert and oriented to person, place, and time.      Cranial Nerves: No cranial nerve deficit.      Coordination: Coordination normal.   Psychiatric:         Behavior: Behavior normal.         Thought Content: Thought content normal.         Judgment: Judgment normal.         Laboratory  I have reviewed the pertinent laboratory tests. These are the pertinent findings:  · [id number]    Imaging  I have reviewed the pertinent imaging study reports. These are the pertinent findings:  · DEXA 2019; -1.7 worst t score left hip  · Mammogram last 3-2020  · c-scope 2010--overdue  · CTA chest 2019    Assessment & Plan   Diagnoses and associated orders for this visit:  1. Medicare annual wellness visit, subsequent  -     ANNUAL WELLNESS VISIT SUBSEQUENT VISIT W PPS  2. Essential hypertension  -     Comprehensive Metabolic Panel  -     CBC with Automated Differential  -     Thyroid Stimulating Hormone Reflex  -     Lipid Panel With Reflex  3. Dyslipidemia  -     Comprehensive Metabolic Panel  -     Thyroid Stimulating Hormone Reflex  -     Lipid Panel With Reflex  4. Agatston coronary artery calcium score less than 100, score equals 0  5. JASPAL (generalized anxiety disorder)  -     Thyroid Stimulating Hormone Reflex  6. Osteopenia of neck of left femur  -     Comprehensive Metabolic Panel  -     Vitamin D -25 Hydroxy  7. Dilatation of pulmonic artery (CMS/HCC)  -      Comprehensive Metabolic Panel  -     CBC with Automated Differential  8. Encounter for screening mammogram for breast cancer  -     Mammo Screening Bilateral  9. White coat syndrome with diagnosis of hypertension  10. Colon cancer screening  -     COLOGUARD    Lab recheck ordered to monitor and reassess chronic issues  Mammogram encouraged--nearly 2 years since last exam  DEXA last 2019--recommend consideration for repeat in 1-2 years  Colon cancer screening is due: agreeable to cologuard  HTN: home readings look good.  Home cuff was verified accurate last visit.  Continue current medication doses--white coat component likely.  Recheck in 6 months  Continue healthy diet and exercise  May continue cbd for knee pain/OA    All labs, EKG or imaging performed at this visit or prior to this visit have been personally reviewed and analyzed. Any specialty notes pertaining to this patients conditions have also been reviewed.  All chronic conditions are stable except where noted above. Routine diet and exercise were encouraged to maintain a healthy weight and functional status.  Nica Hernandez MD

## 2023-06-19 ENCOUNTER — CARE COORDINATION (OUTPATIENT)
Dept: CARE COORDINATION | Age: 73
End: 2023-06-19

## 2023-06-19 NOTE — CARE COORDINATION
ACM/RN attempted outreach to patient. No answer, voice mail left with contact information and requesting a return call. Joseluis ZAMBRANO, RN  Ambulatory Care Manager  [email or web address]  [phone]

## 2023-06-20 ENCOUNTER — HOSPITAL ENCOUNTER (OUTPATIENT)
Dept: PULMONOLOGY | Age: 73
Discharge: HOME | End: 2023-06-20
Payer: MEDICARE

## 2023-06-20 DIAGNOSIS — R06.02 SOB (SHORTNESS OF BREATH): ICD-10-CM

## 2023-06-20 PROCEDURE — 94640 AIRWAY INHALATION TREATMENT: CPT

## 2023-06-20 PROCEDURE — 94726 PLETHYSMOGRAPHY LUNG VOLUMES: CPT

## 2023-06-20 PROCEDURE — 6360000002 HC RX W HCPCS: Performed by: INTERNAL MEDICINE

## 2023-06-20 PROCEDURE — 94664 DEMO&/EVAL PT USE INHALER: CPT

## 2023-06-20 PROCEDURE — 94760 N-INVAS EAR/PLS OXIMETRY 1: CPT

## 2023-06-20 PROCEDURE — 94729 DIFFUSING CAPACITY: CPT

## 2023-06-20 PROCEDURE — 94150 VITAL CAPACITY TEST: CPT

## 2023-06-20 RX ORDER — ALBUTEROL SULFATE 2.5 MG/3ML
2.5 SOLUTION RESPIRATORY (INHALATION) EVERY 6 HOURS PRN
Status: DISCONTINUED | OUTPATIENT
Start: 2023-06-20 | End: 2023-06-21 | Stop reason: HOSPADM

## 2023-06-20 RX ADMIN — ALBUTEROL SULFATE 2.5 MG: 2.5 SOLUTION RESPIRATORY (INHALATION) at 14:44

## 2023-06-21 ENCOUNTER — CARE COORDINATION (OUTPATIENT)
Dept: CARE COORDINATION | Age: 73
End: 2023-06-21

## 2023-06-21 ENCOUNTER — TELEPHONE (OUTPATIENT)
Dept: INTERNAL MEDICINE CLINIC | Age: 73
End: 2023-06-21

## 2023-06-21 ASSESSMENT — ENCOUNTER SYMPTOMS: DYSPNEA ASSOCIATED WITH: EXERTION

## 2023-06-21 NOTE — CARE COORDINATION
Ambulatory Care Coordination Note  2023    Patient Current Location:  Home:  [place] [id number]     ACM contacted the patient by telephone. Verified name and  with patient as identifiers. Provided introduction to self, and explanation of the ACM role. Challenges to be reviewed by the provider   Additional needs identified to be addressed with provider: Yes  See Below               Method of communication with provider: chart routing. ACM: Hiram Bray RN    ACM made outreach today following VM received from  [facility]. ACM updated by  [facility] that he is still having SOB with exertion at times.  [facility] uses his albuterol in haler as directed and states that he does not have wheezing and no relief from inhaler.  [facility] also states that he has chronic chest that is waxing and waning pain as well. ACM directed Edy to call 911 or go to the E/R isfpain, SOB worsening.  [facility] verbalized understanding. [facility] also provided  [facility] with contact information to Magdaleno Travis pt verbalized understanding. ACM discussed upcoming appointments with [address] [place] [address] and needing to call PCP for clarification on next office visit as PCP might want to see [address] [place] [address] sooner. ACM will route chart to PCP with updates. ACM notes that pt had his PFT COMPLETE [facility] prior day 23. Germania Ferreira has pulse oximeter and B/P cuff at home and states that his readings prior day are as follows: B/P 114/67, Pulse 60 and oxygen was 96%. ACM instructed Germania Ferreira that his vitals are all WNL. Marla ZAMBRANO, RN  Ambulatory Care Manager  [email or web address]  [phone]    ADDENDUM 502: ACM spoke to  [facility] today to update on PCP recs, \"Follow-up with pulmonology and Cardiology in July is sufficient\" [address] [place] [address] verbalized understanding. Offered patient enrollment in the Remote Patient Monitoring (RPM) program for in-home monitoring: NA.     Lab Results       None            Care Coordination Interventions    Referral from Primary Care Provider:

## 2023-06-21 NOTE — TELEPHONE ENCOUNTER
----- Message from Shiloh Frausto sent at 6/21/2023  1:27 PM EDT -----  Subject: Message to Provider    QUESTIONS  Information for Provider? Pt is calling stating that he spoke with Devorah Parmar   and is still having the breathing issues and would like to know if Dr. Russell Duran would like to see him. Pt also states that he does not see the   cardiologist until 7/12 and the Pulm  7/18. Would like a call back  ---------------------------------------------------------------------------  --------------  Juarez RIOS  [phone]; OK to leave message on voicemail  ---------------------------------------------------------------------------  --------------  SCRIPT ANSWERS  Relationship to Patient?  Self

## 2023-06-27 ENCOUNTER — OFFICE VISIT (OUTPATIENT)
Dept: CARDIOLOGY CLINIC | Age: 73
End: 2023-06-27
Payer: MEDICARE

## 2023-06-27 VITALS — HEART RATE: 68 BPM | BODY MASS INDEX: 24.41 KG/M2 | WEIGHT: 180 LBS | SYSTOLIC BLOOD PRESSURE: 110 MMHG

## 2023-06-27 DIAGNOSIS — I25.119 CORONARY ARTERY DISEASE INVOLVING NATIVE CORONARY ARTERY OF NATIVE HEART WITH ANGINA PECTORIS (HCC): ICD-10-CM

## 2023-06-27 DIAGNOSIS — I25.10 CORONARY ARTERY DISEASE INVOLVING NATIVE CORONARY ARTERY OF NATIVE HEART WITHOUT ANGINA PECTORIS: Primary | ICD-10-CM

## 2023-06-27 DIAGNOSIS — I25.10 CAD IN NATIVE ARTERY: Primary | ICD-10-CM

## 2023-06-27 DIAGNOSIS — Z95.1 HX OF CABG: ICD-10-CM

## 2023-06-27 DIAGNOSIS — E78.5 HYPERLIPIDEMIA, UNSPECIFIED HYPERLIPIDEMIA TYPE: ICD-10-CM

## 2023-06-27 DIAGNOSIS — R06.02 SOB (SHORTNESS OF BREATH): ICD-10-CM

## 2023-06-27 PROCEDURE — 99214 OFFICE O/P EST MOD 30 MIN: CPT | Performed by: INTERNAL MEDICINE

## 2023-06-27 PROCEDURE — 1123F ACP DISCUSS/DSCN MKR DOCD: CPT | Performed by: INTERNAL MEDICINE

## 2023-06-27 ASSESSMENT — ENCOUNTER SYMPTOMS
APNEA: 0
SHORTNESS OF BREATH: 0
CHEST TIGHTNESS: 0
ABDOMINAL DISTENTION: 0
CHOKING: 0
COUGH: 0

## 2023-07-06 ENCOUNTER — CARE COORDINATION (OUTPATIENT)
Dept: CARE COORDINATION | Age: 73
End: 2023-07-06

## 2023-07-06 ASSESSMENT — ENCOUNTER SYMPTOMS: DYSPNEA ASSOCIATED WITH: EXERTION

## 2023-07-06 NOTE — CARE COORDINATION
Ambulatory Care Coordination Note  2023    Patient Current Location:  Home: [address] [place], [place] [address] [address]     ACM contacted the patient by telephone. Verified name and  with patient as identifiers. Provided introduction to self, and explanation of the ACM role. Challenges to be reviewed by the provider   Additional needs identified to be addressed with provider: No  none               Method of communication with provider: none. ACM: Rohini Bray, RN    ACM made outreach made outreach today. Agustin Galvan is taking his medication all as directed with no barriers. No report of chest pain, tightness or LUKE at this time. Agustin Galvan stating that with exertion he does have some SOB but recovers with rest. ACM encouraged Edy to use his pulse oximeter when feeling SWACM encouraged Edy to use his pulse oximeter when  feeling SOB, edy states that he does use pulse Ox and B/P cuff all are WNL's. Reviewed upcoming appointments and Loral Fat understanding. All questions answered at this time with no other concerns. Next outreach in two weeks. Plan:  F/U on Pulmonologist apt. Discuss B/P's, O2. Logging? Assess for further needs. Possible graduation. Offered patient enrollment in the Remote Patient Monitoring (RPM) program for in-home monitoring:  Declined X2 . Lab Results       None            Care Coordination Interventions    Referral from Primary Care Provider: No  Suggested Interventions and Community Resources  Zone Management Tools: In Process (Comment: HTN, COPD Zones mailed. 23)          Goals Addressed                   This Visit's Progress     Conditions and Symptoms   Improving     I will schedule office visits, as directed by my provider. I will keep my appointment or reschedule if I have to cancel. I will notify my provider of any barriers to my plan of care. I will follow my Zone Management tool to seek urgent or emergent care.   I will notify my provider of any symptoms

## 2023-07-11 DIAGNOSIS — F51.01 PRIMARY INSOMNIA: ICD-10-CM

## 2023-07-13 RX ORDER — DICYCLOMINE HCL 20 MG
20 TABLET ORAL 3 TIMES DAILY PRN
Qty: 90 TABLET | Refills: 1 | Status: SHIPPED | OUTPATIENT
Start: 2023-07-13

## 2023-07-18 ENCOUNTER — OFFICE VISIT (OUTPATIENT)
Dept: PULMONOLOGY | Age: 73
End: 2023-07-18
Payer: MEDICARE

## 2023-07-18 VITALS
WEIGHT: 181 LBS | BODY MASS INDEX: 23.99 KG/M2 | HEART RATE: 49 BPM | OXYGEN SATURATION: 98 % | DIASTOLIC BLOOD PRESSURE: 70 MMHG | RESPIRATION RATE: 16 BRPM | SYSTOLIC BLOOD PRESSURE: 118 MMHG | HEIGHT: 73 IN

## 2023-07-18 DIAGNOSIS — Z87.891 HISTORY OF TOBACCO USE: ICD-10-CM

## 2023-07-18 DIAGNOSIS — J45.40 ASTHMA, MODERATE PERSISTENT, POORLY-CONTROLLED: Primary | ICD-10-CM

## 2023-07-18 PROCEDURE — 99204 OFFICE O/P NEW MOD 45 MIN: CPT | Performed by: INTERNAL MEDICINE

## 2023-07-18 PROCEDURE — 1123F ACP DISCUSS/DSCN MKR DOCD: CPT | Performed by: INTERNAL MEDICINE

## 2023-07-18 RX ORDER — CETIRIZINE HYDROCHLORIDE 10 MG/1
10 TABLET, CHEWABLE ORAL DAILY
COMMUNITY

## 2023-07-21 ENCOUNTER — CARE COORDINATION (OUTPATIENT)
Dept: CARE COORDINATION | Age: 73
End: 2023-07-21

## 2023-07-21 NOTE — CARE COORDINATION
ACM/RN attempted outreach to patient. No answer, voice mail left with contact information and requesting a return call. Migdalia ZAMBRANO, RN  Ambulatory Care Manager  [email or web address]  [phone]

## 2023-08-09 ENCOUNTER — CARE COORDINATION (OUTPATIENT)
Dept: CASE MANAGEMENT | Age: 73
End: 2023-08-09

## 2023-08-09 NOTE — CARE COORDINATION
Ambulatory Care Coordination Note  8/9/2023      Attempted to contact patient for follow up transition call. Left voicemail message to return call with an update on condition since discharge. Contact information provided. Will continue to follow up. Lab Results       None            Care Coordination Interventions    Referral from Primary Care Provider: No  Suggested Interventions and Community Resources  Zone Management Tools: In Process (Comment: HTN, COPD Zones mailed.  5/30/23)          Goals Addressed    None         Future Appointments   Date Time Provider [address]   9/12/2023 10:30 AM Edda Dick MD [address] [place] [address]   10/3/2023  9:00 AM Leslye Flores MD [facility] P/CC MMA   12/5/2023 10:45 AM Edda Dick MD [facility]  Care Coordinator  [phone]

## 2023-08-15 RX ORDER — MECLIZINE HYDROCHLORIDE 25 MG/1
TABLET ORAL
Qty: 30 TABLET | Refills: 1 | Status: SHIPPED | OUTPATIENT
Start: 2023-08-15

## 2023-08-17 RX ORDER — CLINDAMYCIN PHOSPHATE 10 MG/G
GEL TOPICAL
Qty: 30 G | Refills: 2 | Status: SHIPPED | OUTPATIENT
Start: 2023-08-17

## 2023-08-22 ENCOUNTER — TELEPHONE (OUTPATIENT)
Dept: PULMONOLOGY | Age: 73
End: 2023-08-22

## 2023-08-22 NOTE — TELEPHONE ENCOUNTER
Patient called to let Dr David Arguello know that he tried Everlena Lock for 7 days and stopped. He did not feel it was helping, it actually made him cough more and said that he just \"did not feel well\" using it. He has a follow up appt in October and will discuss with Dr David Arguello at that time.

## 2023-08-23 ENCOUNTER — CARE COORDINATION (OUTPATIENT)
Dept: CASE MANAGEMENT | Age: 73
End: 2023-08-23

## 2023-08-23 NOTE — CARE COORDINATION
Ambulatory Care Coordination Note  2023    Patient Current Location:  Home: [address] [place] [address] [address] [address]     LPN [address] [facility] contacted the patient by telephone. Verified name and  with patient as identifiers. Provided introduction to self, and explanation of the LPN CC role. Challenges to be reviewed by the provider   Additional needs identified to be addressed with provider: No  none               Method of communication with provider: phone. ACM: Dixon Fernandez LPN  Patient is doing good. Denies cp, sob, cough, fever, chills or fever. Taking his medication, but he isn't taking Breztri. Appetite and fluid intake is good. No urinary or bowel problems. Patient isn't checking BP. He had no needs or concerns. Plan:  F/U in 2 weeks    Offered patient enrollment in the Remote Patient Monitoring (RPM) program for in-home monitoring: Patient declined. Lab Results       None            Care Coordination Interventions    Referral from Primary Care Provider: No  Suggested Interventions and Community Resources  Zone Management Tools: In Process (Comment: HTN, COPD Zones mailed.  23)          Goals Addressed    None         Future Appointments   Date Time Provider [address] [place] [address] Ct   2023 10:30 AM MD ILANA Rizzo [address] [facility] [place] - DYD   10/3/2023  9:00 AM Liam Childers MD [facility] P/CC MMA   2023 10:45 AM MD ILANA Rizzo [address] [facility] [place] - DYD   ,   COPD Assessment    Does the patient understand envrionmental exposure?: Yes  Is the patient able to verbalize Rescue vs. Long Acting medications?: Yes  Does the patient have a nebulizer?: No  Does the patient use a space with inhaled medications?: No            Symptoms:          , and   General Assessment             Dixon Fernandez LPN  Care Coordinator  [phone]

## 2023-08-26 ENCOUNTER — ENROLLMENT (OUTPATIENT)
Dept: CARE COORDINATION | Age: 73
End: 2023-08-26

## 2023-09-05 DIAGNOSIS — R06.00 DYSPNEA, UNSPECIFIED TYPE: ICD-10-CM

## 2023-09-06 DIAGNOSIS — F41.9 ANXIETY: ICD-10-CM

## 2023-09-06 DIAGNOSIS — R42 VERTIGO: ICD-10-CM

## 2023-09-07 RX ORDER — ALBUTEROL SULFATE 90 UG/1
AEROSOL, METERED RESPIRATORY (INHALATION)
Qty: 18 G | Refills: 1 | Status: SHIPPED | OUTPATIENT
Start: 2023-09-07

## 2023-09-07 RX ORDER — CLONAZEPAM 0.5 MG/1
TABLET ORAL
Qty: 30 TABLET | Refills: 2 | Status: SHIPPED | OUTPATIENT
Start: 2023-09-07 | End: 2023-10-07

## 2023-09-12 ENCOUNTER — OFFICE VISIT (OUTPATIENT)
Dept: INTERNAL MEDICINE CLINIC | Age: 73
End: 2023-09-12
Payer: MEDICARE

## 2023-09-12 VITALS
OXYGEN SATURATION: 98 % | HEIGHT: 72 IN | WEIGHT: 182 LBS | BODY MASS INDEX: 24.65 KG/M2 | TEMPERATURE: 97.5 F | SYSTOLIC BLOOD PRESSURE: 128 MMHG | DIASTOLIC BLOOD PRESSURE: 82 MMHG | HEART RATE: 51 BPM

## 2023-09-12 DIAGNOSIS — F51.01 PRIMARY INSOMNIA: ICD-10-CM

## 2023-09-12 DIAGNOSIS — G43.109 MIGRAINE WITH AURA AND WITHOUT STATUS MIGRAINOSUS, NOT INTRACTABLE: ICD-10-CM

## 2023-09-12 DIAGNOSIS — R10.30 LOWER ABDOMINAL PAIN: ICD-10-CM

## 2023-09-12 DIAGNOSIS — I25.10 CORONARY ARTERY DISEASE INVOLVING NATIVE CORONARY ARTERY OF NATIVE HEART WITHOUT ANGINA PECTORIS: Primary | ICD-10-CM

## 2023-09-12 DIAGNOSIS — K21.9 GASTROESOPHAGEAL REFLUX DISEASE WITHOUT ESOPHAGITIS: ICD-10-CM

## 2023-09-12 PROCEDURE — 1123F ACP DISCUSS/DSCN MKR DOCD: CPT | Performed by: HOSPITALIST

## 2023-09-12 PROCEDURE — 99214 OFFICE O/P EST MOD 30 MIN: CPT | Performed by: HOSPITALIST

## 2023-09-12 ASSESSMENT — ENCOUNTER SYMPTOMS
BLOOD IN STOOL: 0
SHORTNESS OF BREATH: 1
ABDOMINAL PAIN: 0
SINUS PRESSURE: 0
ABDOMINAL DISTENTION: 0
WHEEZING: 0
BACK PAIN: 0
CONSTIPATION: 0
DIARRHEA: 0
CHEST TIGHTNESS: 0
VOICE CHANGE: 0
COUGH: 1
TROUBLE SWALLOWING: 0
SORE THROAT: 0
SINUS PAIN: 0
NAUSEA: 0
VOMITING: 0

## 2023-09-18 ENCOUNTER — CARE COORDINATION (OUTPATIENT)
Dept: CARE COORDINATION | Age: 73
End: 2023-09-18

## 2023-09-18 NOTE — CARE COORDINATION
ACM/RN attempted outreach to patient. No answer, voice mail left with contact information and requesting a return call. Jaspreet ZAMBRANO, RN  Ambulatory Care Manager  [email or web address]  [phone]

## 2023-09-21 ENCOUNTER — TELEPHONE (OUTPATIENT)
Dept: INTERNAL MEDICINE CLINIC | Age: 73
End: 2023-09-21

## 2023-09-21 NOTE — TELEPHONE ENCOUNTER
Patient states he still has his cough and mucous issue that he had when he saw Dr. Alex Martinez on 9/12. States he has been taking mucinex for 2 weeks and it still has not changed his symptoms. States the mucous doesn't have a color to it. He doesn't have a fever. States he took covid test twice on 9/7 and 9/11. Patient is requesting a mediation to help with this issue ?     Please call and advise [phone]        [facility] [id number] [facility], [address] [place] [address]

## 2023-09-26 ENCOUNTER — CARE COORDINATION (OUTPATIENT)
Dept: CARE COORDINATION | Age: 73
End: 2023-09-26

## 2023-09-26 NOTE — CARE COORDINATION
ACM/RN attempted outreach to patient. No answer, HIPPA compliant voice mail left with contact information and requesting a return call. [facility] Msg sent with information on [place] contract today. ACM removed from care team. Routed updates to PCP. Radha ZAMBRANO, RN  Ambulatory Care Manager  [email or web address]  [phone]

## 2023-10-24 DIAGNOSIS — K21.9 GASTROESOPHAGEAL REFLUX DISEASE WITHOUT ESOPHAGITIS: ICD-10-CM

## 2023-10-24 DIAGNOSIS — E78.5 HYPERLIPIDEMIA LDL GOAL <70: ICD-10-CM

## 2023-10-25 RX ORDER — PANTOPRAZOLE SODIUM 40 MG/1
TABLET, DELAYED RELEASE ORAL
Qty: 90 TABLET | Refills: 3 | Status: SHIPPED | OUTPATIENT
Start: 2023-10-25

## 2023-10-25 RX ORDER — ROSUVASTATIN CALCIUM 20 MG/1
TABLET, COATED ORAL
Qty: 90 TABLET | Refills: 3 | Status: SHIPPED | OUTPATIENT
Start: 2023-10-25

## 2023-10-25 NOTE — TELEPHONE ENCOUNTER
Requested Prescriptions     Pending Prescriptions Disp Refills    pantoprazole (PROTONIX) 40 MG tablet 90 tablet 3     Sig: TAKE ONE TABLET BY MOUTH DAILY    rosuvastatin (CRESTOR) 20 MG tablet 90 tablet 3     Sig: TAKE ONE TABLET BY MOUTH DAILY     Last OV - 9/12/23  Next OV - 1/16/24  Last refill - 3/8/23  Last labs - 5/24/23

## 2023-10-26 NOTE — TELEPHONE ENCOUNTER
[address] called to request a refill on the following medication:     metoprolol succinate (TOPROL XL) 25 MG extended release tablet [[id number]]     Order Details  Dose, Route, Frequency: As Directed   Dispense Quantity: 90 tablet Refills: 3          Sig: TAKE ONE TABLET BY MOUTH DAILY       Pharmacy    [facility] [address] [address] [phone] - F [phone]   [address] [facility] [address] [address]   Phone:  [phone]  Fax:  [phone]       Last Appt: 6/27/23  Next Appt: 1/9/24  Last Refill: 3/7/23

## 2023-10-27 ENCOUNTER — TELEPHONE (OUTPATIENT)
Dept: CARDIOLOGY CLINIC | Age: 73
End: 2023-10-27

## 2023-10-27 RX ORDER — METOPROLOL SUCCINATE 25 MG/1
TABLET, EXTENDED RELEASE ORAL
Qty: 90 TABLET | Refills: 3 | OUTPATIENT
Start: 2023-10-27

## 2023-10-27 RX ORDER — METOPROLOL SUCCINATE 25 MG/1
TABLET, EXTENDED RELEASE ORAL
Qty: 90 TABLET | Refills: 3 | Status: SHIPPED | OUTPATIENT
Start: 2023-10-27

## 2023-10-27 NOTE — TELEPHONE ENCOUNTER
----- Message from Karena Suarez sent at 10/25/2023  3:05 PM EDT -----  Regarding: Metoprolol succinate  er 25mg  Contact: [phone]  Woo, last prescription I did on this was refilled by a Ferdinand Barr NP.  My last refill was lost in the mail with two others from Dr Lay Sender, need refills on this for my 90 day supplies go to [facility] thank you Post office admitted delivered to the wrong address great news

## 2023-10-27 NOTE — TELEPHONE ENCOUNTER
Requested Prescriptions     Pending Prescriptions Disp Refills    metoprolol succinate (TOPROL XL) 25 MG extended release tablet 90 tablet 3     Sig: TAKE ONE TABLET BY MOUTH DAILY       Last Office Visit: 6/27/2023     Next Office Visit: [phone]

## 2023-11-02 ENCOUNTER — OFFICE VISIT (OUTPATIENT)
Dept: INTERNAL MEDICINE CLINIC | Age: 73
End: 2023-11-02
Payer: MEDICARE

## 2023-11-02 VITALS
BODY MASS INDEX: 24.52 KG/M2 | HEIGHT: 72 IN | SYSTOLIC BLOOD PRESSURE: 138 MMHG | DIASTOLIC BLOOD PRESSURE: 80 MMHG | WEIGHT: 181 LBS

## 2023-11-02 DIAGNOSIS — G24.5 EYE TWITCH: Primary | ICD-10-CM

## 2023-11-02 PROCEDURE — 99213 OFFICE O/P EST LOW 20 MIN: CPT | Performed by: HOSPITALIST

## 2023-11-02 PROCEDURE — 1123F ACP DISCUSS/DSCN MKR DOCD: CPT | Performed by: HOSPITALIST

## 2023-11-02 ASSESSMENT — ENCOUNTER SYMPTOMS
ABDOMINAL PAIN: 0
NAUSEA: 0
VOICE CHANGE: 0
SORE THROAT: 0
BLOOD IN STOOL: 0
BACK PAIN: 0
CHEST TIGHTNESS: 0
ABDOMINAL DISTENTION: 0
SINUS PRESSURE: 0
TROUBLE SWALLOWING: 0
COUGH: 0
DIARRHEA: 0
CONSTIPATION: 0
SINUS PAIN: 0
SHORTNESS OF BREATH: 0
VOMITING: 0
WHEEZING: 0

## 2023-11-02 NOTE — PROGRESS NOTES
([address])    Left foot pain    Migraine with aura, not intractable    Lower abdominal pain    Right adrenal mass (HCC)    SOB (shortness of breath)     Past Surgical History:   Procedure Laterality Date    COLONOSCOPY      2017, diverticulosis, repeat 5/15/2026 Dr. Gloria Chandler    COLONOSCOPY N/A 10/5/2022    COLONOSCOPY POLYPECTOMY SNARE/COLD BIOPSY performed by Ilene Vigil MD at [address] [facility]      2 Vessels     ESOPHAGEAL DILATATION      ESOPHAGOSCOPY N/A 2022    ESOPHAGOSCOPY BIOPSY performed by Ilene Vigil MD at [address] [facility] Right     PROSTATE SURGERY  2017    TURP     Social History     Socioeconomic History    Marital status: Single     Spouse name: Not on file    Number of children: Not on file    Years of education: Not on file    Highest education level: Not on file   Occupational History    Not on file   Tobacco Use    Smoking status: Former     Packs/day: 1.00     Years: 50.00     Additional pack years: 0.00     Total pack years: 50.00     Types: Cigarettes     Quit date: 2019     Years since quittin.2     Passive exposure: Never    Smokeless tobacco: Never   Vaping Use    Vaping Use: Never used   Substance and Sexual Activity    Alcohol use: Not Currently     Comment: Rarely    Drug use: Never    Sexual activity: Not on file   Other Topics Concern    Not on file   Social History Narrative    Not on file     Social Determinants of Health     Financial Resource Strain: Low Risk  (2022)    Overall Financial Resource Strain (CARDIA)     Difficulty of Paying Living Expenses: Not hard at all   Food Insecurity: No Food Insecurity (2022)    Hunger Vital Sign     Worried About Running Out of Food in the Last Year: Never true     [address] in the Last Year: Never true   Transportation Needs: No Transportation Needs (2023)    PRAPARE - Transportation     Lack of Transportation (Medical):  No

## 2023-12-06 ENCOUNTER — OFFICE VISIT (OUTPATIENT)
Dept: INTERNAL MEDICINE CLINIC | Age: 73
End: 2023-12-06
Payer: MEDICARE

## 2023-12-06 VITALS
SYSTOLIC BLOOD PRESSURE: 158 MMHG | HEIGHT: 72 IN | BODY MASS INDEX: 24.79 KG/M2 | WEIGHT: 183 LBS | DIASTOLIC BLOOD PRESSURE: 84 MMHG

## 2023-12-06 DIAGNOSIS — G43.109 MIGRAINE WITH AURA AND WITHOUT STATUS MIGRAINOSUS, NOT INTRACTABLE: Primary | ICD-10-CM

## 2023-12-06 DIAGNOSIS — I25.119 CORONARY ARTERY DISEASE INVOLVING NATIVE CORONARY ARTERY OF NATIVE HEART WITH ANGINA PECTORIS (HCC): ICD-10-CM

## 2023-12-06 PROCEDURE — 99214 OFFICE O/P EST MOD 30 MIN: CPT | Performed by: HOSPITALIST

## 2023-12-06 PROCEDURE — 1123F ACP DISCUSS/DSCN MKR DOCD: CPT | Performed by: HOSPITALIST

## 2023-12-06 ASSESSMENT — ENCOUNTER SYMPTOMS
WHEEZING: 0
ABDOMINAL PAIN: 0
SORE THROAT: 0
COUGH: 0
BLOOD IN STOOL: 0
CONSTIPATION: 0
VOMITING: 0
DIARRHEA: 0
TROUBLE SWALLOWING: 0
SINUS PAIN: 0
NAUSEA: 0
ABDOMINAL DISTENTION: 0
VOICE CHANGE: 0
SINUS PRESSURE: 0
SHORTNESS OF BREATH: 0
BACK PAIN: 0
CHEST TIGHTNESS: 0

## 2023-12-06 NOTE — PROGRESS NOTES
[facility]  Follow-up visit   2023    Jorden Das (:  1950) is a 68 y.o. male, here for follow-up:    Chief Complaint   Patient presents with    Headache     Off and on all day for the last 5 days, bp has been up a in 140s         HPI    Tran Garcia is a 68 y.o. male with past medical history of COPD, CAD s/p CABG, hyperlipidemia,GERD, migraines, insomnia. Migraine with aura, not intractable  Patient feels this is diet related and precipitated by caffeine or chocolate intake. He has less than 1 migraine monthly. Sampled Nurtec in the past.  This did not seem to help much. Continuing current lifestyle modifications recommended. Currently, the patient has been under stress. He has had whole head headache without any aura for approximately 8 to 9 days. He will use acetaminophen. He has this at home. I gave him some sample of Ubrelvy. CAD/CABG/lipids  Reviewed previous records and testing including myoview . Compliant with crestor 20 mg qd, toprol 25 mg qd, asa 81 mg qd. Denies chest pain, palpitations, or leg swelling. He follows with Dr. Makeda Jean. Lower abdominal pain  Has seen Dr. Jackie Martínez. Patient has had an EGD and colonoscopy by Dr. Demetrice Douglas 10/5/2022 (colonoscopy) and 2022 (EGD). No acute findings meriting change in the plan. His symptoms are intermittent. Gastroesophageal reflux disease without esophagitis  Patient's upper endoscopy of 2022. His symptoms are well controlled with daily pantoprazole and occasional use of sucralfate. Lifestyle modifications recommended cont F/U with GI meds and diet . Primary insomnia  Patient is on Halcion 0.25 mg nightly. Typically using this 5 times a week and skip Saturday and Tuesday nights. Pulmonary emphysema (HCC)   Well-controlled, continue current medications and lifestyle modifications recommended cont meds prn      Right adrenal mass (HCC)  No symptoms.   MRI

## 2024-01-09 ENCOUNTER — OFFICE VISIT (OUTPATIENT)
Dept: CARDIOLOGY CLINIC | Age: 74
End: 2024-01-09
Payer: COMMERCIAL

## 2024-01-09 VITALS
BODY MASS INDEX: 24.82 KG/M2 | HEART RATE: 60 BPM | DIASTOLIC BLOOD PRESSURE: 64 MMHG | SYSTOLIC BLOOD PRESSURE: 110 MMHG | WEIGHT: 183 LBS

## 2024-01-09 DIAGNOSIS — I25.10 CAD IN NATIVE ARTERY: Primary | ICD-10-CM

## 2024-01-09 DIAGNOSIS — Z95.1 HX OF CABG: ICD-10-CM

## 2024-01-09 DIAGNOSIS — E78.5 HYPERLIPIDEMIA, UNSPECIFIED HYPERLIPIDEMIA TYPE: ICD-10-CM

## 2024-01-09 PROCEDURE — 99214 OFFICE O/P EST MOD 30 MIN: CPT | Performed by: INTERNAL MEDICINE

## 2024-01-09 PROCEDURE — 1123F ACP DISCUSS/DSCN MKR DOCD: CPT | Performed by: INTERNAL MEDICINE

## 2024-01-09 ASSESSMENT — ENCOUNTER SYMPTOMS
CHEST TIGHTNESS: 0
COUGH: 0
CHOKING: 0
SHORTNESS OF BREATH: 0
APNEA: 0
ABDOMINAL DISTENTION: 0

## 2024-01-09 NOTE — PROGRESS NOTES
Subjective:      Patient ID: Terry Roy is a 73 y.o. male.    HPI  Former pt Dr Tavares. Follow up for CAD/CABG/lipids.  CP and ER .  Work 3 days a week.  Walk on other days. Breathing ok.  No chest pain. Vague numbness in chest.   No pnd /orthopnea.   No edema.  No tachy/syncope. Feeling great.  No tachy/syncope.     Past Medical History:   Diagnosis Date    Acid reflux     Asthma     CAD (coronary artery disease)     COPD (chronic obstructive pulmonary disease) (HCC)     mild    Hyperlipidemia     Insomnia     Vertigo      Past Surgical History:   Procedure Laterality Date    COLONOSCOPY      2017, diverticulosis, repeat 5/15/2026 Dr. Thaddeus Lewis    COLONOSCOPY N/A 10/5/2022    COLONOSCOPY POLYPECTOMY SNARE/COLD BIOPSY performed by Sam PISANO MD at [facility] ENDOSCOPY    CORONARY ARTERY BYPASS GRAFT      2 Vessels     ESOPHAGEAL DILATATION      ESOPHAGOSCOPY N/A 2022    ESOPHAGOSCOPY BIOPSY performed by Sam PISANO MD at [facility] ENDOSCOPY    INGUINAL HERNIA REPAIR Right     PROSTATE SURGERY  2017    TURP     Social History     Socioeconomic History    Marital status: Single     Spouse name: Not on file    Number of children: Not on file    Years of education: Not on file    Highest education level: Not on file   Occupational History    Not on file   Tobacco Use    Smoking status: Former     Current packs/day: 0.00     Average packs/day: 1 pack/day for 50.0 years (50.0 ttl pk-yrs)     Types: Cigarettes     Start date: 1969     Quit date: 2019     Years since quittin.4     Passive exposure: Never    Smokeless tobacco: Never   Vaping Use    Vaping Use: Never used   Substance and Sexual Activity    Alcohol use: Not Currently     Comment: Rarely    Drug use: Never    Sexual activity: Not on file   Other Topics Concern    Not on file   Social History Narrative    Not on file     Social Determinants of Health     Financial Resource Strain: Low Risk  (2022)    Overall

## 2024-01-16 ENCOUNTER — OFFICE VISIT (OUTPATIENT)
Dept: INTERNAL MEDICINE CLINIC | Age: 74
End: 2024-01-16
Payer: MEDICARE

## 2024-01-16 VITALS
SYSTOLIC BLOOD PRESSURE: 108 MMHG | BODY MASS INDEX: 24.24 KG/M2 | DIASTOLIC BLOOD PRESSURE: 68 MMHG | WEIGHT: 179 LBS | OXYGEN SATURATION: 98 % | HEART RATE: 46 BPM | HEIGHT: 72 IN | TEMPERATURE: 97.2 F

## 2024-01-16 DIAGNOSIS — F41.9 ANXIETY: ICD-10-CM

## 2024-01-16 DIAGNOSIS — I25.119 CORONARY ARTERY DISEASE INVOLVING NATIVE CORONARY ARTERY OF NATIVE HEART WITH ANGINA PECTORIS (HCC): ICD-10-CM

## 2024-01-16 DIAGNOSIS — R06.00 DYSPNEA, UNSPECIFIED TYPE: ICD-10-CM

## 2024-01-16 DIAGNOSIS — J43.2 CENTRILOBULAR EMPHYSEMA (HCC): ICD-10-CM

## 2024-01-16 DIAGNOSIS — B35.1 ONYCHOMYCOSIS: ICD-10-CM

## 2024-01-16 DIAGNOSIS — F51.01 PRIMARY INSOMNIA: ICD-10-CM

## 2024-01-16 DIAGNOSIS — Z00.00 PREVENTATIVE HEALTH CARE: Primary | ICD-10-CM

## 2024-01-16 DIAGNOSIS — E78.5 HYPERLIPIDEMIA LDL GOAL <70: ICD-10-CM

## 2024-01-16 DIAGNOSIS — K21.9 GASTROESOPHAGEAL REFLUX DISEASE WITHOUT ESOPHAGITIS: ICD-10-CM

## 2024-01-16 DIAGNOSIS — E27.8 RIGHT ADRENAL MASS (HCC): ICD-10-CM

## 2024-01-16 DIAGNOSIS — R42 VERTIGO: ICD-10-CM

## 2024-01-16 PROCEDURE — 1123F ACP DISCUSS/DSCN MKR DOCD: CPT | Performed by: HOSPITALIST

## 2024-01-16 PROCEDURE — 99214 OFFICE O/P EST MOD 30 MIN: CPT | Performed by: HOSPITALIST

## 2024-01-16 RX ORDER — ALBUTEROL SULFATE 90 UG/1
AEROSOL, METERED RESPIRATORY (INHALATION)
Qty: 18 G | Refills: 1 | Status: SHIPPED | OUTPATIENT
Start: 2024-01-16

## 2024-01-16 RX ORDER — ROSUVASTATIN CALCIUM 20 MG/1
TABLET, COATED ORAL
Qty: 90 TABLET | Refills: 1 | Status: SHIPPED | OUTPATIENT
Start: 2024-01-16

## 2024-01-16 RX ORDER — SILDENAFIL 25 MG/1
25 TABLET, FILM COATED ORAL DAILY PRN
Qty: 30 TABLET | Refills: 0 | Status: SHIPPED | OUTPATIENT
Start: 2024-01-16 | End: 2024-02-15

## 2024-01-16 RX ORDER — MECLIZINE HYDROCHLORIDE 25 MG/1
TABLET ORAL
Qty: 30 TABLET | Refills: 1 | Status: SHIPPED | OUTPATIENT
Start: 2024-01-16

## 2024-01-16 RX ORDER — PANTOPRAZOLE SODIUM 40 MG/1
TABLET, DELAYED RELEASE ORAL
Qty: 90 TABLET | Refills: 1 | Status: SHIPPED | OUTPATIENT
Start: 2024-01-16

## 2024-01-16 RX ORDER — CLONAZEPAM 0.5 MG/1
TABLET ORAL
Qty: 30 TABLET | Refills: 2 | Status: SHIPPED | OUTPATIENT
Start: 2024-01-16 | End: 2024-05-26

## 2024-01-16 RX ORDER — MULTIVIT-MIN/IRON/FOLIC ACID/K 18-600-40
CAPSULE ORAL
COMMUNITY

## 2024-01-16 ASSESSMENT — ENCOUNTER SYMPTOMS
SORE THROAT: 0
BACK PAIN: 0
SINUS PAIN: 0
SINUS PRESSURE: 0
WHEEZING: 0
CHEST TIGHTNESS: 0
SHORTNESS OF BREATH: 0
DIARRHEA: 0
VOMITING: 0
VOICE CHANGE: 0
TROUBLE SWALLOWING: 0
NAUSEA: 0
COUGH: 0
ABDOMINAL DISTENTION: 0
ABDOMINAL PAIN: 0
CONSTIPATION: 0
BLOOD IN STOOL: 0

## 2024-01-16 ASSESSMENT — PATIENT HEALTH QUESTIONNAIRE - PHQ9
SUM OF ALL RESPONSES TO PHQ QUESTIONS 1-9: 0
SUM OF ALL RESPONSES TO PHQ QUESTIONS 1-9: 0
1. LITTLE INTEREST OR PLEASURE IN DOING THINGS: 0
SUM OF ALL RESPONSES TO PHQ9 QUESTIONS 1 & 2: 0
SUM OF ALL RESPONSES TO PHQ QUESTIONS 1-9: 0
2. FEELING DOWN, DEPRESSED OR HOPELESS: 0
SUM OF ALL RESPONSES TO PHQ QUESTIONS 1-9: 0

## 2024-01-16 NOTE — PROGRESS NOTES
Procedures    Hepatitis C Antibody    CBC    Comprehensive Metabolic Panel    Lipid, Fasting    TSH with Reflex    PSA, Prostatic Specific Antigen (Juarez Salazar)    SRINIVAS - Thaddeus Chung DPM, Podiatry, [facility]      Orders Placed This Encounter   Medications    rosuvastatin (CRESTOR) 20 MG tablet     Sig: TAKE ONE TABLET BY MOUTH DAILY     Dispense:  90 tablet     Refill:  1    pantoprazole (PROTONIX) 40 MG tablet     Sig: TAKE ONE TABLET BY MOUTH DAILY     Dispense:  90 tablet     Refill:  1    meclizine (ANTIVERT) 25 MG tablet     Sig: TAKE ONE TABLET BY MOUTH THREE TIMES A DAY AS NEEDED FOR DIZZINESS     Dispense:  30 tablet     Refill:  1    clonazePAM (KLONOPIN) 0.5 MG tablet     Sig: TAKE 1 TABLET BY MOUTH DAILY AS NEEDED FOR ANXIETY     Dispense:  30 tablet     Refill:  2    albuterol sulfate HFA (PROVENTIL;VENTOLIN;PROAIR) 108 (90 Base) MCG/ACT inhaler     Sig: USE 2 INHALATIONS ORALLY 4 TIMES DAILY AS NEEDED FOR  WHEEZING     Dispense:  18 g     Refill:  1    triazolam (HALCION) 0.25 MG tablet     Sig: Take 1 tablet by mouth nightly as needed (insomnia) for up to 90 days. Max Daily Amount: 250 mcg     Dispense:  30 tablet     Refill:  2      Medications Discontinued During This Encounter   Medication Reason    meclizine (ANTIVERT) 25 MG tablet REORDER    albuterol sulfate HFA (PROVENTIL;VENTOLIN;PROAIR) 108 (90 Base) MCG/ACT inhaler REORDER    clonazePAM (KLONOPIN) 0.5 MG tablet REORDER    pantoprazole (PROTONIX) 40 MG tablet REORDER    rosuvastatin (CRESTOR) 20 MG tablet REORDER        Return in about 6 months (around 7/16/2024) for Annual Physical .    ROMELIA GONZALEZ MD    This dictation was generated by voice recognition computer software.  Although all attempts are made to edit the dictation for accuracy, there may be errors in the transcription that are not intended.

## 2024-01-20 RX ORDER — CLINDAMYCIN PHOSPHATE 10 MG/G
GEL TOPICAL
Qty: 30 G | Refills: 2 | Status: SHIPPED | OUTPATIENT
Start: 2024-01-20

## 2024-01-24 ENCOUNTER — TELEPHONE (OUTPATIENT)
Dept: INTERNAL MEDICINE CLINIC | Age: 74
End: 2024-01-24

## 2024-01-24 NOTE — TELEPHONE ENCOUNTER
Pt called stated he has acid reflex very bad and Monday he had a bad case of it. His voice is now horse and he had a lot of burning. He said he has never had it this bad before. He has been taking Sucralfate 4 times a day Pantoprazole 40 daily but does not seem to be helping. Wants to know what he should be doing?

## 2024-01-25 ENCOUNTER — TELEPHONE (OUTPATIENT)
Dept: INTERNAL MEDICINE CLINIC | Age: 74
End: 2024-01-25

## 2024-01-25 DIAGNOSIS — K21.9 GASTROESOPHAGEAL REFLUX DISEASE, UNSPECIFIED WHETHER ESOPHAGITIS PRESENT: Primary | ICD-10-CM

## 2024-01-25 NOTE — TELEPHONE ENCOUNTER
Pt is requesting a referral to be sent to Dr. Sam Walsh at [facility].  The phone number for Dr. Walsh is :   [phone]        Please call pt and advise:     [phone]

## 2024-02-14 DIAGNOSIS — K21.9 GASTROESOPHAGEAL REFLUX DISEASE WITHOUT ESOPHAGITIS: ICD-10-CM

## 2024-02-14 DIAGNOSIS — E78.5 HYPERLIPIDEMIA LDL GOAL <70: ICD-10-CM

## 2024-02-15 RX ORDER — ROSUVASTATIN CALCIUM 20 MG/1
TABLET, COATED ORAL
Qty: 90 TABLET | Refills: 1 | Status: SHIPPED | OUTPATIENT
Start: 2024-02-15

## 2024-02-15 RX ORDER — PANTOPRAZOLE SODIUM 40 MG/1
TABLET, DELAYED RELEASE ORAL
Qty: 90 TABLET | Refills: 1 | Status: SHIPPED | OUTPATIENT
Start: 2024-02-15

## 2024-02-16 ENCOUNTER — TELEPHONE (OUTPATIENT)
Dept: CARDIOLOGY CLINIC | Age: 74
End: 2024-02-16

## 2024-02-16 RX ORDER — METOPROLOL SUCCINATE 25 MG/1
TABLET, EXTENDED RELEASE ORAL
Qty: 90 TABLET | Refills: 3 | Status: SHIPPED | OUTPATIENT
Start: 2024-02-16

## 2024-02-16 NOTE — TELEPHONE ENCOUNTER
Requested Prescriptions     Pending Prescriptions Disp Refills    metoprolol succinate (TOPROL XL) 25 MG extended release tablet 90 tablet 3     Sig: TAKE ONE TABLET BY MOUTH DAILY          Number: 90    Refills: 3    Last Office Visit: 1/9/2024     Next Office Visit: 7/9/2024

## 2024-04-02 ENCOUNTER — OFFICE VISIT (OUTPATIENT)
Dept: INTERNAL MEDICINE CLINIC | Age: 74
End: 2024-04-02
Payer: MEDICARE

## 2024-04-02 VITALS
TEMPERATURE: 97.8 F | WEIGHT: 181 LBS | SYSTOLIC BLOOD PRESSURE: 118 MMHG | DIASTOLIC BLOOD PRESSURE: 78 MMHG | HEIGHT: 72 IN | BODY MASS INDEX: 24.52 KG/M2 | OXYGEN SATURATION: 97 % | HEART RATE: 71 BPM

## 2024-04-02 DIAGNOSIS — M25.471 RIGHT ANKLE SWELLING: Primary | ICD-10-CM

## 2024-04-02 DIAGNOSIS — M79.89 RIGHT LEG SWELLING: ICD-10-CM

## 2024-04-02 PROCEDURE — 99213 OFFICE O/P EST LOW 20 MIN: CPT | Performed by: STUDENT IN AN ORGANIZED HEALTH CARE EDUCATION/TRAINING PROGRAM

## 2024-04-02 PROCEDURE — 1123F ACP DISCUSS/DSCN MKR DOCD: CPT | Performed by: STUDENT IN AN ORGANIZED HEALTH CARE EDUCATION/TRAINING PROGRAM

## 2024-04-02 SDOH — ECONOMIC STABILITY: FOOD INSECURITY: WITHIN THE PAST 12 MONTHS, THE FOOD YOU BOUGHT JUST DIDN'T LAST AND YOU DIDN'T HAVE MONEY TO GET MORE.: NEVER TRUE

## 2024-04-02 SDOH — ECONOMIC STABILITY: FOOD INSECURITY: WITHIN THE PAST 12 MONTHS, YOU WORRIED THAT YOUR FOOD WOULD RUN OUT BEFORE YOU GOT MONEY TO BUY MORE.: NEVER TRUE

## 2024-04-02 SDOH — ECONOMIC STABILITY: INCOME INSECURITY: HOW HARD IS IT FOR YOU TO PAY FOR THE VERY BASICS LIKE FOOD, HOUSING, MEDICAL CARE, AND HEATING?: NOT HARD AT ALL

## 2024-04-02 NOTE — PROGRESS NOTES
NEEDED 30 g 2    Cholecalciferol (VITAMIN D) 50 MCG (2000 UT) CAPS capsule Take by mouth      meclizine (ANTIVERT) 25 MG tablet TAKE ONE TABLET BY MOUTH THREE TIMES A DAY AS NEEDED FOR DIZZINESS 30 tablet 1    clonazePAM (KLONOPIN) 0.5 MG tablet TAKE 1 TABLET BY MOUTH DAILY AS NEEDED FOR ANXIETY 30 tablet 2    albuterol sulfate HFA (PROVENTIL;VENTOLIN;PROAIR) 108 (90 Base) MCG/ACT inhaler USE 2 INHALATIONS ORALLY 4 TIMES DAILY AS NEEDED FOR  WHEEZING 18 g 1    triazolam (HALCION) 0.25 MG tablet Take 1 tablet by mouth nightly as needed (insomnia) for up to 90 days. Max Daily Amount: 250 mcg 30 tablet 2    Omega-3 Fatty Acids (FISH OIL PO) Take by mouth      cetirizine (ZYRTEC) 10 MG chewable tablet Take 1 tablet by mouth daily      Fluticasone Propionate (FLONASE NA) by Nasal route      dicyclomine (BENTYL) 20 MG tablet Take 1 tablet by mouth 3 times daily as needed (Abd c/o) 90 tablet 1    aspirin 81 MG EC tablet Take 1 tablet by mouth daily      Ascorbic Acid (VITAMIN C) 500 MG CAPS Take 1 capsule by mouth daily      Multiple Vitamins-Minerals (MULTIVITAMIN ADULTS 50+) TABS Take by mouth daily      Simethicone (GAS-X PO) Take by mouth as needed      acetaminophen (TYLENOL) 325 MG tablet Take 2 tablets by mouth every 6 hours as needed for Pain      sildenafil (VIAGRA) 25 MG tablet Take 1 tablet by mouth daily as needed for Erectile Dysfunction 30 tablet 0     No current facility-administered medications for this visit.       Objective       Lab Results   Component Value Date    WBC 5.7 01/02/2024    HGB 14.6 01/02/2024    HCT 42.4 01/02/2024    MCV 94.9 01/02/2024     01/02/2024      Lab Results   Component Value Date     01/02/2024    K 5.1 01/02/2024     01/02/2024    CO2 34 (H) 01/02/2024    BUN 23 (H) 01/02/2024    CREATININE 0.8 01/02/2024    GLUCOSE 121 (H) 01/02/2024    CALCIUM 9.6 01/02/2024    PROT 6.5 01/02/2024    LABALBU 4.7 01/02/2024    BILITOT 0.4 01/02/2024    ALKPHOS 72 01/02/2024

## 2024-04-03 ENCOUNTER — HOSPITAL ENCOUNTER (OUTPATIENT)
Dept: VASCULAR LAB | Age: 74
Discharge: HOME | End: 2024-04-03
Payer: MEDICARE

## 2024-04-03 DIAGNOSIS — M25.471 RIGHT ANKLE SWELLING: ICD-10-CM

## 2024-04-03 DIAGNOSIS — M79.89 RIGHT LEG SWELLING: ICD-10-CM

## 2024-04-03 PROCEDURE — 93971 EXTREMITY STUDY: CPT

## 2024-04-09 ENCOUNTER — TELEPHONE (OUTPATIENT)
Dept: INTERNAL MEDICINE CLINIC | Age: 74
End: 2024-04-09

## 2024-04-09 DIAGNOSIS — M25.471 RIGHT ANKLE SWELLING: Primary | ICD-10-CM

## 2024-04-09 NOTE — TELEPHONE ENCOUNTER
Ankle still swollen and sore. Taking tylenol and wants to what is the next step.    Please call and advise: [phone]

## 2024-04-10 ENCOUNTER — HOSPITAL ENCOUNTER (OUTPATIENT)
Dept: GENERAL RADIOLOGY | Age: 74
Discharge: HOME | End: 2024-04-10
Payer: MEDICARE

## 2024-04-10 DIAGNOSIS — M25.471 RIGHT ANKLE SWELLING: ICD-10-CM

## 2024-04-10 PROCEDURE — 73610 X-RAY EXAM OF ANKLE: CPT

## 2024-04-26 DIAGNOSIS — R42 VERTIGO: ICD-10-CM

## 2024-04-29 RX ORDER — MECLIZINE HYDROCHLORIDE 25 MG/1
TABLET ORAL
Qty: 30 TABLET | Refills: 1 | Status: SHIPPED | OUTPATIENT
Start: 2024-04-29

## 2024-05-14 DIAGNOSIS — F51.01 PRIMARY INSOMNIA: ICD-10-CM

## 2024-05-16 ENCOUNTER — OFFICE VISIT (OUTPATIENT)
Dept: INTERNAL MEDICINE CLINIC | Age: 74
End: 2024-05-16
Payer: MEDICARE

## 2024-05-16 VITALS
DIASTOLIC BLOOD PRESSURE: 80 MMHG | TEMPERATURE: 97.7 F | WEIGHT: 179.8 LBS | HEART RATE: 50 BPM | SYSTOLIC BLOOD PRESSURE: 130 MMHG | HEIGHT: 72 IN | BODY MASS INDEX: 24.35 KG/M2 | OXYGEN SATURATION: 98 %

## 2024-05-16 DIAGNOSIS — G43.109 MIGRAINE WITH AURA AND WITHOUT STATUS MIGRAINOSUS, NOT INTRACTABLE: ICD-10-CM

## 2024-05-16 DIAGNOSIS — R06.02 SHORTNESS OF BREATH: Primary | ICD-10-CM

## 2024-05-16 DIAGNOSIS — R10.30 LOWER ABDOMINAL PAIN: ICD-10-CM

## 2024-05-16 DIAGNOSIS — K21.9 GASTROESOPHAGEAL REFLUX DISEASE WITHOUT ESOPHAGITIS: ICD-10-CM

## 2024-05-16 DIAGNOSIS — J43.2 CENTRILOBULAR EMPHYSEMA (HCC): ICD-10-CM

## 2024-05-16 DIAGNOSIS — I25.10 CORONARY ARTERY DISEASE INVOLVING NATIVE CORONARY ARTERY OF NATIVE HEART WITHOUT ANGINA PECTORIS: ICD-10-CM

## 2024-05-16 PROCEDURE — G2211 COMPLEX E/M VISIT ADD ON: HCPCS | Performed by: HOSPITALIST

## 2024-05-16 PROCEDURE — 99214 OFFICE O/P EST MOD 30 MIN: CPT | Performed by: HOSPITALIST

## 2024-05-16 PROCEDURE — 1123F ACP DISCUSS/DSCN MKR DOCD: CPT | Performed by: HOSPITALIST

## 2024-05-16 ASSESSMENT — ENCOUNTER SYMPTOMS
TROUBLE SWALLOWING: 0
BLOOD IN STOOL: 0
VOICE CHANGE: 0
NAUSEA: 0
COUGH: 1
BACK PAIN: 0
DIARRHEA: 0
ABDOMINAL DISTENTION: 0
VOMITING: 0
SINUS PRESSURE: 0
ABDOMINAL PAIN: 0
CHEST TIGHTNESS: 0
WHEEZING: 0
SINUS PAIN: 0
CONSTIPATION: 0

## 2024-05-16 NOTE — PROGRESS NOTES
ONE TABLET PER DAY) 30 tablet 2    meclizine (ANTIVERT) 25 MG tablet TAKE ONE TABLET BY MOUTH THREE TIMES A DAY AS NEEDED FOR DIZZINESS 30 tablet 1    metoprolol succinate (TOPROL XL) 25 MG extended release tablet TAKE ONE TABLET BY MOUTH DAILY 90 tablet 3    pantoprazole (PROTONIX) 40 MG tablet To be filled by Provider 90 tablet 1    rosuvastatin (CRESTOR) 20 MG tablet To be filled by Provider 90 tablet 1    clindamycin (CLINDAGEL) 1 % gel APPLY TO AFFECTED AREA(S) DAILY AS NEEDED 30 g 2    Cholecalciferol (VITAMIN D) 50 MCG (2000 UT) CAPS capsule Take by mouth      clonazePAM (KLONOPIN) 0.5 MG tablet TAKE 1 TABLET BY MOUTH DAILY AS NEEDED FOR ANXIETY 30 tablet 2    albuterol sulfate HFA (PROVENTIL;VENTOLIN;PROAIR) 108 (90 Base) MCG/ACT inhaler USE 2 INHALATIONS ORALLY 4 TIMES DAILY AS NEEDED FOR  WHEEZING 18 g 1    sildenafil (VIAGRA) 25 MG tablet Take 1 tablet by mouth daily as needed for Erectile Dysfunction 30 tablet 0    Omega-3 Fatty Acids (FISH OIL PO) Take by mouth      cetirizine (ZYRTEC) 10 MG chewable tablet Take 1 tablet by mouth daily      Fluticasone Propionate (FLONASE NA) by Nasal route daily      dicyclomine (BENTYL) 20 MG tablet Take 1 tablet by mouth 3 times daily as needed (Abd c/o) 90 tablet 1    aspirin 81 MG EC tablet Take 1 tablet by mouth daily      Ascorbic Acid (VITAMIN C) 500 MG CAPS Take 1 capsule by mouth daily      Multiple Vitamins-Minerals (MULTIVITAMIN ADULTS 50+) TABS Take by mouth daily      Simethicone (GAS-X PO) Take by mouth as needed      acetaminophen (TYLENOL) 325 MG tablet Take 2 tablets by mouth every 6 hours as needed for Pain       No current facility-administered medications on file prior to visit.      No Known Allergies  Past Medical History:   Diagnosis Date    Acid reflux     Asthma     CAD (coronary artery disease)     COPD (chronic obstructive pulmonary disease) ([facility])     mild    Hyperlipidemia     Insomnia     Vertigo      Patient Active Problem List   Diagnosis

## 2024-05-22 ENCOUNTER — HOSPITAL ENCOUNTER (OUTPATIENT)
Dept: CT IMAGING | Age: 74
Discharge: HOME | End: 2024-05-22
Attending: HOSPITALIST
Payer: MEDICARE

## 2024-05-22 DIAGNOSIS — R06.02 SHORTNESS OF BREATH: ICD-10-CM

## 2024-05-22 PROCEDURE — 71250 CT THORAX DX C-: CPT

## 2024-05-24 NOTE — RESULT ENCOUNTER NOTE
Please inform the patient,,    Hi Mr. Roy,    There are no concerning findings on your CT scan of the chest.  I do not think this would be the cause of your shortness of breath.  Go ahead and get the heart test as well on June 18.  I will follow-up with you as regards the results.

## 2024-06-11 ENCOUNTER — TELEPHONE (OUTPATIENT)
Dept: INTERNAL MEDICINE CLINIC | Age: 74
End: 2024-06-11

## 2024-06-11 NOTE — TELEPHONE ENCOUNTER
Symptoms started Sunday  Covid Positive today  Sore throat, dry cough, congestion   No fever    Would like to request paxlovid if possible      [facility] [id number] - [place], [place] - [address] - P [phone] - F [phone]

## 2024-06-12 RX ORDER — NIRMATRELVIR AND RITONAVIR 150-100 MG
KIT ORAL
Qty: 20 TABLET | Refills: 0 | Status: SHIPPED | OUTPATIENT
Start: 2024-06-12 | End: 2024-06-17

## 2024-06-12 NOTE — TELEPHONE ENCOUNTER
Paxlovid for the patient to [facility].  However, he would have to stop his nightly Halcion, stop his cholesterol medicine, and not use any Viagra while he is taking the medicine, and for 5 days after he completes the course.

## 2024-07-03 DIAGNOSIS — I25.119 CORONARY ARTERY DISEASE INVOLVING NATIVE CORONARY ARTERY OF NATIVE HEART WITH ANGINA PECTORIS (HCC): ICD-10-CM

## 2024-07-03 DIAGNOSIS — Z00.00 PREVENTATIVE HEALTH CARE: ICD-10-CM

## 2024-07-03 DIAGNOSIS — E78.5 HYPERLIPIDEMIA LDL GOAL <70: ICD-10-CM

## 2024-07-03 DIAGNOSIS — R06.02 SHORTNESS OF BREATH: ICD-10-CM

## 2024-07-03 LAB
ALBUMIN SERPL-MCNC: 4.5 G/DL (ref 3.4–5)
ALBUMIN/GLOB SERPL: 1.9 {RATIO} (ref 1.1–2.2)
ALP SERPL-CCNC: 66 U/L (ref 40–129)
ALT SERPL-CCNC: 16 U/L (ref 10–40)
ANION GAP SERPL CALCULATED.3IONS-SCNC: 7 MMOL/L (ref 3–16)
AST SERPL-CCNC: 17 U/L (ref 15–37)
BILIRUB SERPL-MCNC: 0.5 MG/DL (ref 0–1)
BUN SERPL-MCNC: 22 MG/DL (ref 7–20)
CALCIUM SERPL-MCNC: 9.7 MG/DL (ref 8.3–10.6)
CHLORIDE SERPL-SCNC: 104 MMOL/L (ref 99–110)
CHOLEST SERPL-MCNC: 92 MG/DL (ref 0–199)
CO2 SERPL-SCNC: 31 MMOL/L (ref 21–32)
CREAT SERPL-MCNC: 0.7 MG/DL (ref 0.8–1.3)
DEPRECATED RDW RBC AUTO: 12.7 % (ref 12.4–15.4)
GFR SERPLBLD CREATININE-BSD FMLA CKD-EPI: >90 ML/MIN/{1.73_M2}
GLUCOSE SERPL-MCNC: 99 MG/DL (ref 70–99)
HCT VFR BLD AUTO: 40.9 % (ref 40.5–52.5)
HCV AB SERPL QL IA: NORMAL
HDLC SERPL-MCNC: 53 MG/DL (ref 40–60)
HGB BLD-MCNC: 13.9 G/DL (ref 13.5–17.5)
LDL CHOLESTEROL: 17 MG/DL
MCH RBC QN AUTO: 32 PG (ref 26–34)
MCHC RBC AUTO-ENTMCNC: 33.9 G/DL (ref 31–36)
MCV RBC AUTO: 94.3 FL (ref 80–100)
NT-PROBNP SERPL-MCNC: 313 PG/ML (ref 0–124)
PLATELET # BLD AUTO: 207 K/UL (ref 135–450)
PMV BLD AUTO: 9.9 FL (ref 5–10.5)
POTASSIUM SERPL-SCNC: 4.7 MMOL/L (ref 3.5–5.1)
PROT SERPL-MCNC: 6.9 G/DL (ref 6.4–8.2)
PSA SERPL DL<=0.01 NG/ML-MCNC: 2.17 NG/ML (ref 0–4)
RBC # BLD AUTO: 4.33 M/UL (ref 4.2–5.9)
SODIUM SERPL-SCNC: 142 MMOL/L (ref 136–145)
TRIGL SERPL-MCNC: 110 MG/DL (ref 0–150)
TSH SERPL DL<=0.005 MIU/L-ACNC: 2.31 UIU/ML (ref 0.27–4.2)
VLDLC SERPL CALC-MCNC: 22 MG/DL
WBC # BLD AUTO: 5.4 K/UL (ref 4–11)

## 2024-07-08 SDOH — HEALTH STABILITY: PHYSICAL HEALTH: ON AVERAGE, HOW MANY DAYS PER WEEK DO YOU ENGAGE IN MODERATE TO STRENUOUS EXERCISE (LIKE A BRISK WALK)?: 5 DAYS

## 2024-07-08 SDOH — HEALTH STABILITY: PHYSICAL HEALTH: ON AVERAGE, HOW MANY MINUTES DO YOU ENGAGE IN EXERCISE AT THIS LEVEL?: 30 MIN

## 2024-07-08 ASSESSMENT — PATIENT HEALTH QUESTIONNAIRE - PHQ9
SUM OF ALL RESPONSES TO PHQ QUESTIONS 1-9: 0
2. FEELING DOWN, DEPRESSED OR HOPELESS: NOT AT ALL
1. LITTLE INTEREST OR PLEASURE IN DOING THINGS: NOT AT ALL
SUM OF ALL RESPONSES TO PHQ QUESTIONS 1-9: 0
SUM OF ALL RESPONSES TO PHQ9 QUESTIONS 1 & 2: 0

## 2024-07-08 ASSESSMENT — LIFESTYLE VARIABLES
HOW OFTEN DO YOU HAVE SIX OR MORE DRINKS ON ONE OCCASION: 1
HOW OFTEN DO YOU HAVE A DRINK CONTAINING ALCOHOL: NEVER
HOW OFTEN DO YOU HAVE A DRINK CONTAINING ALCOHOL: 1
HOW MANY STANDARD DRINKS CONTAINING ALCOHOL DO YOU HAVE ON A TYPICAL DAY: PATIENT DOES NOT DRINK
HOW MANY STANDARD DRINKS CONTAINING ALCOHOL DO YOU HAVE ON A TYPICAL DAY: 0

## 2024-07-08 NOTE — PROGRESS NOTES
kg (179 lb 12.8 oz)       Physical Exam  CONSTITUTIONAL: awake, alert, cooperative, no apparent distress, Oriented x 3  HEENT/NECK : Normal. No JVD. No thyromegaly. No bruit  LUNGS: Good respiratory effort. . On auscultation bilateral equal air entry. Normal breath sounds  CARDIOVASCULAR: No left parasternal heave., Regular rate and rhythm, normal S1 and S2, no S3 or S4, and no murmur or rub noted.  ABDOMEN: Soft, nontender, Bowel sounds present. No masses or tenderness.  SKIN: Warm and dry. no jaundice and no petechiae  EXTREMITIES: No lower extremity edema. No cyanosis or clubbing.  NEUROLOGICAL : No gross focal neurological deficit  MUSCULOSKELETAL: Muscle tone: normal. No tenderness  GENITAL/URINARY: not assessed    Labs:   Results reviewed    Lab Results   Component Value Date    WBC 5.4 07/03/2024    HGB 13.9 07/03/2024    HCT 40.9 07/03/2024    MCV 94.3 07/03/2024     07/03/2024     Lab Results   Component Value Date     07/03/2024    K 4.7 07/03/2024     07/03/2024    CO2 31 07/03/2024    BUN 22 (H) 07/03/2024    CREATININE 0.7 (L) 07/03/2024    GLUCOSE 99 07/03/2024    CALCIUM 9.7 07/03/2024    BILITOT 0.5 07/03/2024    ALKPHOS 66 07/03/2024    AST 17 07/03/2024    ALT 16 07/03/2024    LABGLOM >90 07/03/2024    GFRAA >60 08/23/2022    AGRATIO 1.9 07/03/2024    GLOB 2.4 09/04/2021         Lab Results   Component Value Date    CHOL 85 08/23/2022    CHOL 110 02/22/2022    CHOL 99 09/04/2021     Lab Results   Component Value Date    TRIG 107 08/23/2022    TRIG 186 (H) 02/22/2022    TRIG 111 09/04/2021     Lab Results   Component Value Date    HDL 53 07/03/2024    HDL 48 01/02/2024    HDL 46 03/09/2023     No components found for: \"LDLCHOLESTEROL\", \"LDLCALC\"  Lab Results   Component Value Date    VLDL 22 07/03/2024    VLDL 16 01/02/2024    VLDL 26 03/09/2023       Imaging:     Last ECG (if available, Personally interpreted):  Sinus rhythm  Last Monitor/Holter (if available):11/10/19    Normal

## 2024-07-10 ENCOUNTER — HOSPITAL ENCOUNTER (OUTPATIENT)
Age: 74
Discharge: HOME | End: 2024-07-12
Attending: HOSPITALIST
Payer: MEDICARE

## 2024-07-10 VITALS
DIASTOLIC BLOOD PRESSURE: 80 MMHG | WEIGHT: 179 LBS | BODY MASS INDEX: 24.24 KG/M2 | HEIGHT: 72 IN | SYSTOLIC BLOOD PRESSURE: 130 MMHG

## 2024-07-10 DIAGNOSIS — R06.02 SHORTNESS OF BREATH: ICD-10-CM

## 2024-07-10 LAB
ECHO AO ROOT DIAM: 3.3 CM
ECHO AO ROOT INDEX: 1.63 CM/M2
ECHO AR MAX VEL PISA: 3.6 M/S
ECHO AV AREA PEAK VELOCITY: 2.8 CM2
ECHO AV AREA/BSA PEAK VELOCITY: 1.4 CM2/M2
ECHO AV CUSP MM: 2.2 CM
ECHO AV PEAK GRADIENT: 7 MMHG
ECHO AV PEAK VELOCITY: 1.3 M/S
ECHO AV REGURGITANT PHT: 590 MS
ECHO AV VELOCITY RATIO: 0.77
ECHO BSA: 2.03 M2
ECHO EST RA PRESSURE: 3 MMHG
ECHO LA AREA 2C: 22.1 CM2
ECHO LA AREA 4C: 20.9 CM2
ECHO LA MAJOR AXIS: 5.4 CM
ECHO LA MINOR AXIS: 5.5 CM
ECHO LA VOL BP: 70 ML (ref 18–58)
ECHO LA VOL MOD A2C: 73 ML (ref 18–58)
ECHO LA VOL MOD A4C: 66 ML (ref 18–58)
ECHO LA VOL/BSA BIPLANE: 34 ML/M2 (ref 16–34)
ECHO LA VOLUME INDEX MOD A2C: 36 ML/M2 (ref 16–34)
ECHO LA VOLUME INDEX MOD A4C: 33 ML/M2 (ref 16–34)
ECHO LV E' LATERAL VELOCITY: 7 CM/S
ECHO LV E' SEPTAL VELOCITY: 7 CM/S
ECHO LV EDV A2C: 104 ML
ECHO LV EDV A4C: 104 ML
ECHO LV EDV INDEX A4C: 51 ML/M2
ECHO LV EDV NDEX A2C: 51 ML/M2
ECHO LV EJECTION FRACTION A2C: 61 %
ECHO LV EJECTION FRACTION A4C: 50 %
ECHO LV EJECTION FRACTION BIPLANE: 54 % (ref 55–100)
ECHO LV ESV A2C: 40 ML
ECHO LV ESV A4C: 52 ML
ECHO LV ESV INDEX A2C: 20 ML/M2
ECHO LV ESV INDEX A4C: 26 ML/M2
ECHO LV FRACTIONAL SHORTENING: 38 % (ref 28–44)
ECHO LV INTERNAL DIMENSION DIASTOLE INDEX: 2.46 CM/M2
ECHO LV INTERNAL DIMENSION DIASTOLIC: 5 CM (ref 4.2–5.9)
ECHO LV INTERNAL DIMENSION SYSTOLIC INDEX: 1.53 CM/M2
ECHO LV INTERNAL DIMENSION SYSTOLIC: 3.1 CM
ECHO LV IVSD: 1.3 CM (ref 0.6–1)
ECHO LV MASS 2D: 220.3 G (ref 88–224)
ECHO LV MASS INDEX 2D: 108.5 G/M2 (ref 49–115)
ECHO LV POSTERIOR WALL DIASTOLIC: 1 CM (ref 0.6–1)
ECHO LV RELATIVE WALL THICKNESS RATIO: 0.4
ECHO LVOT AREA: 3.8 CM2
ECHO LVOT DIAM: 2.2 CM
ECHO LVOT PEAK GRADIENT: 4 MMHG
ECHO LVOT PEAK VELOCITY: 1 M/S
ECHO PULMONARY ARTERY END DIASTOLIC PRESSURE: 4 MMHG
ECHO PV MAX VELOCITY: 0.8 M/S
ECHO PV PEAK GRADIENT: 3 MMHG
ECHO PV REGURGITANT MAX VELOCITY: 1 M/S
ECHO RA AREA 4C: 22.9 CM2
ECHO RA END SYSTOLIC VOLUME APICAL 4 CHAMBER INDEX BSA: 32 ML/M2
ECHO RA VOLUME: 65 ML
ECHO RIGHT VENTRICULAR SYSTOLIC PRESSURE (RVSP): 27 MMHG
ECHO RV BASAL DIMENSION: 5.2 CM
ECHO RV FREE WALL PEAK S': 15 CM/S
ECHO RV TAPSE: 2.5 CM (ref 1.7–?)
ECHO TV REGURGITANT MAX VELOCITY: 2.45 M/S
ECHO TV REGURGITANT PEAK GRADIENT: 24 MMHG

## 2024-07-10 PROCEDURE — 93306 TTE W/DOPPLER COMPLETE: CPT

## 2024-07-10 PROCEDURE — 93306 TTE W/DOPPLER COMPLETE: CPT | Performed by: INTERNAL MEDICINE

## 2024-07-11 ENCOUNTER — OFFICE VISIT (OUTPATIENT)
Dept: CARDIOLOGY CLINIC | Age: 74
End: 2024-07-11
Payer: MEDICARE

## 2024-07-11 VITALS
SYSTOLIC BLOOD PRESSURE: 130 MMHG | BODY MASS INDEX: 24.01 KG/M2 | WEIGHT: 177 LBS | HEART RATE: 51 BPM | DIASTOLIC BLOOD PRESSURE: 80 MMHG

## 2024-07-11 DIAGNOSIS — E78.5 HYPERLIPIDEMIA, UNSPECIFIED HYPERLIPIDEMIA TYPE: ICD-10-CM

## 2024-07-11 DIAGNOSIS — Z95.1 HX OF CABG: ICD-10-CM

## 2024-07-11 DIAGNOSIS — I25.10 CORONARY ARTERY DISEASE INVOLVING NATIVE CORONARY ARTERY OF NATIVE HEART WITHOUT ANGINA PECTORIS: Primary | ICD-10-CM

## 2024-07-11 PROCEDURE — 93000 ELECTROCARDIOGRAM COMPLETE: CPT | Performed by: INTERNAL MEDICINE

## 2024-07-11 PROCEDURE — 1123F ACP DISCUSS/DSCN MKR DOCD: CPT | Performed by: INTERNAL MEDICINE

## 2024-07-11 PROCEDURE — 99214 OFFICE O/P EST MOD 30 MIN: CPT | Performed by: INTERNAL MEDICINE

## 2024-07-11 NOTE — PATIENT INSTRUCTIONS
Thank you for choosing [facility] for your cardiac care.    During your visit today, we reviewed and confirmed your cardiac medications along with  medication prescribed by your other healthcare team members. Please be sure to discuss any  changes to medication with your providers.    Please bring a list of ALL medications (or the bottles) with you to EVERY appointment.  Also include vitamins and over-the-counter medications.    If you need refills for any cardiac medications, please call your pharmacy and they will reach out to us electronically.    Did your provider order testing today? If yes, then you will receive your results in three  possible ways. You can receive a [employer] message, a phone call, or letter in the mail. Please  note, if you are an active [employer] user, some of your testing will be available within 1-2 days.    Finally, please know that it is good for your heart to exercise and follow a healthy, low-fat diet  as advised by your physician and health care providers.    If you are experiencing a medical emergency, please call 911 immediately.    It's easy to register for a [employer] account if you don't already have one. With a [employer]  account you can manage your health record, view test results, schedule appointments and  more.     Dr. Mo's clinical staff can be reached at the following phone number: [phone]

## 2024-07-16 ENCOUNTER — TELEPHONE (OUTPATIENT)
Dept: INTERNAL MEDICINE CLINIC | Age: 74
End: 2024-07-16

## 2024-07-16 ENCOUNTER — OFFICE VISIT (OUTPATIENT)
Dept: INTERNAL MEDICINE CLINIC | Age: 74
End: 2024-07-16
Payer: MEDICARE

## 2024-07-16 VITALS
DIASTOLIC BLOOD PRESSURE: 80 MMHG | HEART RATE: 56 BPM | TEMPERATURE: 97 F | OXYGEN SATURATION: 97 % | SYSTOLIC BLOOD PRESSURE: 110 MMHG | BODY MASS INDEX: 24.41 KG/M2 | WEIGHT: 180 LBS

## 2024-07-16 DIAGNOSIS — E78.5 HYPERLIPIDEMIA LDL GOAL <70: ICD-10-CM

## 2024-07-16 DIAGNOSIS — F51.01 PRIMARY INSOMNIA: ICD-10-CM

## 2024-07-16 DIAGNOSIS — R42 VERTIGO: ICD-10-CM

## 2024-07-16 DIAGNOSIS — Z00.00 MEDICARE ANNUAL WELLNESS VISIT, SUBSEQUENT: Primary | ICD-10-CM

## 2024-07-16 DIAGNOSIS — F41.9 ANXIETY: ICD-10-CM

## 2024-07-16 DIAGNOSIS — K21.9 GASTROESOPHAGEAL REFLUX DISEASE WITHOUT ESOPHAGITIS: ICD-10-CM

## 2024-07-16 PROCEDURE — G0439 PPPS, SUBSEQ VISIT: HCPCS | Performed by: HOSPITALIST

## 2024-07-16 PROCEDURE — 99214 OFFICE O/P EST MOD 30 MIN: CPT | Performed by: HOSPITALIST

## 2024-07-16 PROCEDURE — 1123F ACP DISCUSS/DSCN MKR DOCD: CPT | Performed by: HOSPITALIST

## 2024-07-16 RX ORDER — METOPROLOL SUCCINATE 25 MG/1
TABLET, EXTENDED RELEASE ORAL
Qty: 90 TABLET | Refills: 3 | Status: SHIPPED | OUTPATIENT
Start: 2024-07-16

## 2024-07-16 RX ORDER — CLINDAMYCIN PHOSPHATE 10 MG/G
GEL TOPICAL
Qty: 30 G | Refills: 2 | Status: SHIPPED | OUTPATIENT
Start: 2024-07-16

## 2024-07-16 RX ORDER — ROSUVASTATIN CALCIUM 20 MG/1
TABLET, COATED ORAL
Qty: 90 TABLET | Refills: 1 | Status: SHIPPED | OUTPATIENT
Start: 2024-07-16

## 2024-07-16 RX ORDER — PANTOPRAZOLE SODIUM 40 MG/1
TABLET, DELAYED RELEASE ORAL
Qty: 90 TABLET | Refills: 1 | Status: SHIPPED | OUTPATIENT
Start: 2024-07-16

## 2024-07-16 RX ORDER — CLONAZEPAM 0.5 MG/1
TABLET ORAL
Qty: 30 TABLET | Refills: 2 | Status: SHIPPED | OUTPATIENT
Start: 2024-07-16 | End: 2025-01-14

## 2024-07-16 RX ORDER — MECLIZINE HYDROCHLORIDE 25 MG/1
TABLET ORAL
Qty: 30 TABLET | Refills: 1 | Status: SHIPPED | OUTPATIENT
Start: 2024-07-16

## 2024-07-16 RX ORDER — CYANOCOBALAMIN (VITAMIN B-12) 1000 MCG
TABLET ORAL
COMMUNITY

## 2024-07-16 NOTE — TELEPHONE ENCOUNTER
Pharm is needing a call back to get directions for these meds as none came over.     pantoprazole (PROTONIX) 40 MG tablet [[id number]]    rosuvastatin (CRESTOR) 20 MG tablet [[id number]]    Please call and advise

## 2024-07-16 NOTE — PATIENT INSTRUCTIONS
your heart. It is never too late to quit. Try to avoid secondhand smoke too.     Stay at a weight that's healthy for you. Talk to your doctor if you need help losing weight.     Try to get 7 to 9 hours of sleep each night.     Limit alcohol to 2 drinks a day for men and 1 drink a day for women. Too much alcohol can cause health problems.     Manage other health problems such as diabetes, high blood pressure, and high cholesterol. If you think you may have a problem with alcohol or drug use, talk to your doctor.   Medicines    Take your medicines exactly as prescribed. Call your doctor if you think you are having a problem with your medicine.     If your doctor recommends aspirin, take the amount directed each day. Make sure you take aspirin and not another kind of pain reliever, such as acetaminophen (Tylenol).   When should you call for help?   Call 911 if you have symptoms of a heart attack. These may include:    Chest pain or pressure, or a strange feeling in the chest.     Sweating.     Shortness of breath.     Pain, pressure, or a strange feeling in the back, neck, jaw, or upper belly or in one or both shoulders or arms.     Lightheadedness or sudden weakness.     A fast or irregular heartbeat.   After you call 911, the  may tell you to chew 1 adult-strength or 2 to 4 low-dose aspirin. Wait for an ambulance. Do not try to drive yourself.  Watch closely for changes in your health, and be sure to contact your doctor if you have any problems.  Where can you learn more?  Go to https://www.[employer].net/patientEd and enter F075 to learn more about \"A Healthy Heart: Care Instructions.\"  Current as of: June 24, 2023  Content Version: 14.1  © 0008-2822 [employer].   Care instructions adapted under license by [employer]. If you have questions about a medical condition or this instruction, always ask your healthcare professional. [employer] disclaims any warranty or liability for

## 2024-07-16 NOTE — PROGRESS NOTES
Medication Sig Taking? Authorizing Provider   Cyanocobalamin (B-12) 1000 MCG TABS Take by mouth Yes Candie Ng MD   clindamycin 1 % gel Apply topically 2 times daily. Yes Jacki Singleton MD   meclizine (ANTIVERT) 25 MG tablet TAKE ONE TABLET BY MOUTH THREE TIMES A DAY AS NEEDED FOR DIZZINESS Yes Jacki Singleton MD   pantoprazole (PROTONIX) 40 MG tablet To be filled by Provider Yes Jacki Singleton MD   rosuvastatin (CRESTOR) 20 MG tablet To be filled by Provider Yes Jacki Singleton MD   clonazePAM (KLONOPIN) 0.5 MG tablet TAKE 1 TABLET BY MOUTH DAILY AS NEEDED FOR ANXIETY Yes Jacki Singleton MD   metoprolol succinate (TOPROL XL) 25 MG extended release tablet TAKE ONE TABLET BY MOUTH DAILY Yes Jacki Singleton MD   triazolam (HALCION) 0.25 MG tablet TAKE ONE TABLET BY MOUTH ONCE NIGHTLY AS NEEDED FOR INSOMNIA FOR UP TO 90 DAYS (MAX ONE TABLET PER DAY) Yes Jacki Singleton MD   Cholecalciferol (VITAMIN D) 50 MCG (2000 UT) CAPS capsule Take by mouth Yes Candie Ng MD   albuterol sulfate HFA (PROVENTIL;VENTOLIN;PROAIR) 108 (90 Base) MCG/ACT inhaler USE 2 INHALATIONS ORALLY 4 TIMES DAILY AS NEEDED FOR  WHEEZING Yes Jacki Singleton MD   sildenafil (VIAGRA) 25 MG tablet Take 1 tablet by mouth daily as needed for Erectile Dysfunction Yes Jacki Singleton MD   Omega-3 Fatty Acids (FISH OIL PO) Take by mouth 3 times daily 3,000 mg Yes Candie Ng MD   cetirizine (ZYRTEC) 10 MG chewable tablet Take 1 tablet by mouth daily Yes Candie Ng MD   Fluticasone Propionate (FLONASE NA) by Nasal route daily Yes Candie Ng MD   dicyclomine (BENTYL) 20 MG tablet Take 1 tablet by mouth 3 times daily as needed (Abd c/o) Yes Jacki Singleton MD   aspirin 81 MG EC tablet Take 1 tablet by mouth daily Yes Candie Ng MD   Ascorbic Acid (VITAMIN C) 500 MG CAPS Take 1 capsule by mouth daily Yes Candie Ng MD   Multiple Vitamins-Minerals (MULTIVITAMIN ADULTS 50+) TABS Take by

## 2024-07-17 RX ORDER — ROSUVASTATIN CALCIUM 20 MG/1
TABLET, COATED ORAL
Qty: 90 TABLET | Refills: 1 | OUTPATIENT
Start: 2024-07-17

## 2024-08-06 ENCOUNTER — TELEPHONE (OUTPATIENT)
Dept: INTERNAL MEDICINE CLINIC | Age: 74
End: 2024-08-06

## 2024-08-06 NOTE — TELEPHONE ENCOUNTER
Pt is expriencing soreness to the touch around his belly button area and a cramp like ache towards his right side.   Please call and advise: [phone]

## 2024-08-08 RX ORDER — DICYCLOMINE HCL 20 MG
TABLET ORAL
Qty: 90 TABLET | Refills: 1 | Status: SHIPPED | OUTPATIENT
Start: 2024-08-08

## 2024-08-12 ENCOUNTER — OFFICE VISIT (OUTPATIENT)
Dept: INTERNAL MEDICINE CLINIC | Age: 74
End: 2024-08-12
Payer: MEDICARE

## 2024-08-12 VITALS
HEART RATE: 50 BPM | DIASTOLIC BLOOD PRESSURE: 78 MMHG | TEMPERATURE: 98.6 F | BODY MASS INDEX: 24.63 KG/M2 | OXYGEN SATURATION: 96 % | SYSTOLIC BLOOD PRESSURE: 128 MMHG | WEIGHT: 181.6 LBS

## 2024-08-12 DIAGNOSIS — R14.0 ABDOMINAL BLOATING: Primary | ICD-10-CM

## 2024-08-12 DIAGNOSIS — I25.10 CORONARY ARTERY DISEASE INVOLVING NATIVE CORONARY ARTERY OF NATIVE HEART WITHOUT ANGINA PECTORIS: ICD-10-CM

## 2024-08-12 DIAGNOSIS — K21.9 GASTROESOPHAGEAL REFLUX DISEASE, UNSPECIFIED WHETHER ESOPHAGITIS PRESENT: ICD-10-CM

## 2024-08-12 PROCEDURE — G2211 COMPLEX E/M VISIT ADD ON: HCPCS | Performed by: HOSPITALIST

## 2024-08-12 PROCEDURE — 99213 OFFICE O/P EST LOW 20 MIN: CPT | Performed by: HOSPITALIST

## 2024-08-12 PROCEDURE — 1123F ACP DISCUSS/DSCN MKR DOCD: CPT | Performed by: HOSPITALIST

## 2024-08-12 ASSESSMENT — ENCOUNTER SYMPTOMS
ABDOMINAL PAIN: 1
SINUS PRESSURE: 0
COUGH: 0
TROUBLE SWALLOWING: 0
BLOOD IN STOOL: 0
VOMITING: 0
SORE THROAT: 0
ABDOMINAL DISTENTION: 1
CONSTIPATION: 0
DIARRHEA: 0
CHEST TIGHTNESS: 0
BACK PAIN: 0
WHEEZING: 0
SHORTNESS OF BREATH: 0
SINUS PAIN: 0
VOICE CHANGE: 0
NAUSEA: 0

## 2024-08-12 NOTE — PROGRESS NOTES
[facility]  Follow-up visit   2024    Terry Roy (:  1950) is a 74 y.o. male, here for follow-up:    Chief Complaint   Patient presents with    Abdominal Cramping     He has been having abdominal cramping and bloating for about 10 days.         HPI  Abdominal cramping and bloating.    The patient has had 10 days of cramping abdominal pain, bloating, passing excessive gas.  He thinks he has been constipated.  He has been eating prunes.  He denies any fevers or chills, nausea or vomiting, or blood in the stool.    ROS  Review of Systems   Constitutional:  Negative for activity change, appetite change, chills, diaphoresis, fatigue, fever and unexpected weight change.   HENT:  Negative for sinus pressure, sinus pain, sore throat, trouble swallowing and voice change.    Eyes:  Negative for visual disturbance.   Respiratory:  Negative for cough, chest tightness, shortness of breath and wheezing.    Cardiovascular:  Negative for chest pain, palpitations and leg swelling.   Gastrointestinal:  Positive for abdominal distention and abdominal pain. Negative for blood in stool, constipation, diarrhea, nausea and vomiting.   Endocrine: Negative for polydipsia and polyphagia.   Genitourinary:  Negative for decreased urine volume, difficulty urinating, dysuria and urgency.   Musculoskeletal:  Negative for back pain, gait problem, joint swelling and myalgias.   Neurological:  Negative for dizziness, seizures, syncope, light-headedness and headaches.   Psychiatric/Behavioral:  Negative for agitation, behavioral problems, confusion and suicidal ideas.        HISTORIES  Current Outpatient Medications on File Prior to Visit   Medication Sig Dispense Refill    dicyclomine (BENTYL) 20 MG tablet TAKE ONE TABLET BY MOUTH THREE TIMES A DAY AS NEEDED FOR ABDOMINAL CRAMPING 90 tablet 1    Cyanocobalamin (B-12) 1000 MCG TABS Take by mouth      clindamycin 1 % gel Apply topically 2 times daily. 30 g 2

## 2024-09-17 ENCOUNTER — OFFICE VISIT (OUTPATIENT)
Dept: INTERNAL MEDICINE CLINIC | Age: 74
End: 2024-09-17
Payer: MEDICARE

## 2024-09-17 VITALS
HEART RATE: 52 BPM | DIASTOLIC BLOOD PRESSURE: 82 MMHG | SYSTOLIC BLOOD PRESSURE: 136 MMHG | BODY MASS INDEX: 24.44 KG/M2 | TEMPERATURE: 97.4 F | WEIGHT: 180.2 LBS | OXYGEN SATURATION: 98 %

## 2024-09-17 DIAGNOSIS — I25.10 CORONARY ARTERY DISEASE INVOLVING NATIVE CORONARY ARTERY OF NATIVE HEART WITHOUT ANGINA PECTORIS: ICD-10-CM

## 2024-09-17 DIAGNOSIS — E78.5 HYPERLIPIDEMIA LDL GOAL <70: ICD-10-CM

## 2024-09-17 DIAGNOSIS — J06.9 UPPER RESPIRATORY TRACT INFECTION, UNSPECIFIED TYPE: Primary | ICD-10-CM

## 2024-09-17 PROCEDURE — 1123F ACP DISCUSS/DSCN MKR DOCD: CPT | Performed by: HOSPITALIST

## 2024-09-17 PROCEDURE — 99214 OFFICE O/P EST MOD 30 MIN: CPT | Performed by: HOSPITALIST

## 2024-09-17 PROCEDURE — G2211 COMPLEX E/M VISIT ADD ON: HCPCS | Performed by: HOSPITALIST

## 2024-09-17 RX ORDER — CODEINE PHOSPHATE/GUAIFENESIN 10-100MG/5
5 LIQUID (ML) ORAL 2 TIMES DAILY PRN
Qty: 118 ML | Refills: 0 | Status: SHIPPED | OUTPATIENT
Start: 2024-09-17 | End: 2024-09-29

## 2024-09-17 ASSESSMENT — ENCOUNTER SYMPTOMS
SORE THROAT: 0
ABDOMINAL DISTENTION: 0
TROUBLE SWALLOWING: 0
CHEST TIGHTNESS: 0
DIARRHEA: 0
VOICE CHANGE: 0
BLOOD IN STOOL: 0
WHEEZING: 0
SINUS PAIN: 0
NAUSEA: 0
SINUS PRESSURE: 0
ABDOMINAL PAIN: 0
BACK PAIN: 0
SHORTNESS OF BREATH: 0
VOMITING: 0
CONSTIPATION: 0
COUGH: 0

## 2024-09-23 ENCOUNTER — TELEPHONE (OUTPATIENT)
Dept: INTERNAL MEDICINE CLINIC | Age: 74
End: 2024-09-23

## 2024-09-30 ENCOUNTER — APPOINTMENT (OUTPATIENT)
Dept: GENERAL RADIOLOGY | Age: 74
End: 2024-09-30
Payer: MEDICARE

## 2024-09-30 ENCOUNTER — HOSPITAL ENCOUNTER (EMERGENCY)
Age: 74
Discharge: HOME | End: 2024-09-30
Attending: EMERGENCY MEDICINE
Payer: MEDICARE

## 2024-09-30 VITALS
SYSTOLIC BLOOD PRESSURE: 133 MMHG | OXYGEN SATURATION: 95 % | WEIGHT: 181.6 LBS | BODY MASS INDEX: 24.6 KG/M2 | DIASTOLIC BLOOD PRESSURE: 73 MMHG | RESPIRATION RATE: 22 BRPM | HEIGHT: 72 IN | TEMPERATURE: 98.3 F | HEART RATE: 45 BPM

## 2024-09-30 DIAGNOSIS — R06.00 DYSPNEA, UNSPECIFIED TYPE: Primary | ICD-10-CM

## 2024-09-30 LAB
ANION GAP SERPL CALCULATED.3IONS-SCNC: 9 MMOL/L (ref 3–16)
BASE EXCESS BLDV CALC-SCNC: 4.9 MMOL/L (ref -2–3)
BASOPHILS # BLD: 0 K/UL (ref 0–0.2)
BASOPHILS NFR BLD: 0.2 %
BUN SERPL-MCNC: 20 MG/DL (ref 7–20)
CALCIUM SERPL-MCNC: 8.9 MG/DL (ref 8.3–10.6)
CHLORIDE SERPL-SCNC: 104 MMOL/L (ref 99–110)
CO2 BLDV-SCNC: 34 MMOL/L
CO2 SERPL-SCNC: 28 MMOL/L (ref 21–32)
COHGB MFR BLDV: 1.2 % (ref 0–1.5)
CREAT SERPL-MCNC: 0.8 MG/DL (ref 0.8–1.3)
DEPRECATED RDW RBC AUTO: 12.7 % (ref 12.4–15.4)
DO-HGB MFR BLDV: 58.1 %
EKG ATRIAL RATE: 45 BPM
EKG DIAGNOSIS: NORMAL
EKG P AXIS: 35 DEGREES
EKG P-R INTERVAL: 180 MS
EKG Q-T INTERVAL: 448 MS
EKG QRS DURATION: 90 MS
EKG QTC CALCULATION (BAZETT): 387 MS
EKG R AXIS: 10 DEGREES
EKG T AXIS: 23 DEGREES
EKG VENTRICULAR RATE: 45 BPM
EOSINOPHIL # BLD: 0.2 K/UL (ref 0–0.6)
EOSINOPHIL NFR BLD: 3.4 %
FLUAV RNA RESP QL NAA+PROBE: NOT DETECTED
FLUBV RNA RESP QL NAA+PROBE: NOT DETECTED
GFR SERPLBLD CREATININE-BSD FMLA CKD-EPI: >90 ML/MIN/{1.73_M2}
GLUCOSE SERPL-MCNC: 115 MG/DL (ref 70–99)
HCO3 BLDV-SCNC: 32.1 MMOL/L (ref 24–28)
HCT VFR BLD AUTO: 42.6 % (ref 40.5–52.5)
HGB BLD-MCNC: 14.6 G/DL (ref 13.5–17.5)
LYMPHOCYTES # BLD: 0.8 K/UL (ref 1–5.1)
LYMPHOCYTES NFR BLD: 13.8 %
MCH RBC QN AUTO: 32.6 PG (ref 26–34)
MCHC RBC AUTO-ENTMCNC: 34.2 G/DL (ref 31–36)
MCV RBC AUTO: 95.2 FL (ref 80–100)
METHGB MFR BLDV: 0.5 % (ref 0–1.5)
MONOCYTES # BLD: 0.4 K/UL (ref 0–1.3)
MONOCYTES NFR BLD: 6.3 %
NEUTROPHILS # BLD: 4.6 K/UL (ref 1.7–7.7)
NEUTROPHILS NFR BLD: 76.3 %
NT-PROBNP SERPL-MCNC: 192 PG/ML (ref 0–124)
NT-PROBNP SERPL-MCNC: 204 PG/ML (ref 0–124)
PCO2 BLDV: 56.6 MMHG (ref 41–51)
PH BLDV: 7.36 [PH] (ref 7.35–7.45)
PLATELET # BLD AUTO: 195 K/UL (ref 135–450)
PMV BLD AUTO: 8.9 FL (ref 5–10.5)
PO2 BLDV: <30 MMHG (ref 25–40)
POTASSIUM SERPL-SCNC: 4.1 MMOL/L (ref 3.5–5.1)
RBC # BLD AUTO: 4.48 M/UL (ref 4.2–5.9)
SAO2 % BLDV: 41 %
SARS-COV-2 RNA RESP QL NAA+PROBE: NOT DETECTED
SODIUM SERPL-SCNC: 141 MMOL/L (ref 136–145)
TROPONIN, HIGH SENSITIVITY: 7 NG/L (ref 0–22)
TROPONIN, HIGH SENSITIVITY: 9 NG/L (ref 0–22)
WBC # BLD AUTO: 6 K/UL (ref 4–11)

## 2024-09-30 PROCEDURE — 36415 COLL VENOUS BLD VENIPUNCTURE: CPT

## 2024-09-30 PROCEDURE — 99285 EMERGENCY DEPT VISIT HI MDM: CPT

## 2024-09-30 PROCEDURE — 93005 ELECTROCARDIOGRAM TRACING: CPT | Performed by: EMERGENCY MEDICINE

## 2024-09-30 PROCEDURE — 85025 COMPLETE CBC W/AUTO DIFF WBC: CPT

## 2024-09-30 PROCEDURE — 83880 ASSAY OF NATRIURETIC PEPTIDE: CPT

## 2024-09-30 PROCEDURE — 84484 ASSAY OF TROPONIN QUANT: CPT

## 2024-09-30 PROCEDURE — 87636 SARSCOV2 & INF A&B AMP PRB: CPT

## 2024-09-30 PROCEDURE — 82803 BLOOD GASES ANY COMBINATION: CPT

## 2024-09-30 PROCEDURE — 71046 X-RAY EXAM CHEST 2 VIEWS: CPT

## 2024-09-30 PROCEDURE — 80048 BASIC METABOLIC PNL TOTAL CA: CPT

## 2024-09-30 ASSESSMENT — PAIN - FUNCTIONAL ASSESSMENT: PAIN_FUNCTIONAL_ASSESSMENT: NONE - DENIES PAIN

## 2024-09-30 NOTE — ED PROVIDER NOTES
THE [facility]  EMERGENCY DEPARTMENT ENCOUNTER          ATTENDING PHYSICIAN NOTE       Date of evaluation: 9/30/2024    Chief Complaint     Shortness of Breath (Pt c/o shortness of breath starting at 0600 this morning.)      History of Present Illness     Terry Roy is a 74 y.o. male who presents with shortness of breath.  Chief complaint states that he started 6:00 this morning but has been talking to family physician because of coughing up phlegm chest congestion and was started on antibiotics.  Took a home COVID test which was negative several days ago.  He was placed on Augmentin by PCP.  Negative stress test in 2022.    Covid booster yest    Used albuerol and Claritin with some relief    ASSESSMENT / PLAN  (MEDICAL DECISION MAKING)     INITIAL VITALS: BP: 131/81, Temp: 98.3 °F (36.8 °C), Pulse: 50, Respirations: 17, SpO2: 97 %      Terry Roy is a 74 y.o. male who presents with shortness of breath.  Chief complaint states that he started 6:00 this morning but has been talking to family physician because of coughing up phlegm chest congestion and was started on antibiotics.  Took a home COVID test which was negative several days ago.  He was placed on Augmentin by PCP.  Negative stress test in 2022.who presents with shortness of breath.  Chief complaint states that he started 6:00 this morning but has been talking to family physician because of coughing up phlegm chest congestion and was started on antibiotics.  Took a home COVID test which was negative several days ago.  He was placed on Augmentin by PCP.  Negative stress test in 2022.    Covid booster yest    Used albuerol and Claritin with some relief at home.    Exam significant for bibasilar crackles with 100% saturation and a pulse of 53 metoprolol    COVID flu is negative CBC was unremarkable as was renal other mild hyperglycemia.  pH 7.3 troponin high-sensitivity 9 and probe .  Looking back at old BMPs she is he has had elevation

## 2024-10-15 DIAGNOSIS — R42 VERTIGO: ICD-10-CM

## 2024-10-15 RX ORDER — MECLIZINE HYDROCHLORIDE 25 MG/1
TABLET ORAL
Qty: 30 TABLET | Refills: 1 | Status: SHIPPED | OUTPATIENT
Start: 2024-10-15

## 2024-11-05 ENCOUNTER — PATIENT MESSAGE (OUTPATIENT)
Dept: INTERNAL MEDICINE CLINIC | Age: 74
End: 2024-11-05

## 2024-11-06 NOTE — TELEPHONE ENCOUNTER
Yes.  Please do the blood work prior to the visit.  The visit has been changed to a regular follow-up appointment.

## 2024-11-12 ENCOUNTER — OFFICE VISIT (OUTPATIENT)
Dept: INTERNAL MEDICINE CLINIC | Age: 74
End: 2024-11-12

## 2024-11-12 VITALS
HEART RATE: 46 BPM | WEIGHT: 183.8 LBS | TEMPERATURE: 98.1 F | SYSTOLIC BLOOD PRESSURE: 116 MMHG | OXYGEN SATURATION: 93 % | DIASTOLIC BLOOD PRESSURE: 72 MMHG | BODY MASS INDEX: 24.93 KG/M2

## 2024-11-12 DIAGNOSIS — F51.01 PRIMARY INSOMNIA: ICD-10-CM

## 2024-11-12 DIAGNOSIS — G43.109 MIGRAINE WITH AURA AND WITHOUT STATUS MIGRAINOSUS, NOT INTRACTABLE: Primary | ICD-10-CM

## 2024-11-12 RX ORDER — BUTALBITAL, ACETAMINOPHEN AND CAFFEINE 50; 325; 40 MG/1; MG/1; MG/1
1 TABLET ORAL EVERY 4 HOURS PRN
Qty: 180 TABLET | Refills: 3 | Status: SHIPPED | OUTPATIENT
Start: 2024-11-12

## 2024-11-12 RX ORDER — TRIAZOLAM 0.25 MG
0.25 TABLET ORAL NIGHTLY PRN
Qty: 30 TABLET | Refills: 0 | Status: SHIPPED | OUTPATIENT
Start: 2024-11-12 | End: 2024-12-12

## 2024-11-12 ASSESSMENT — ENCOUNTER SYMPTOMS
ABDOMINAL PAIN: 0
SINUS PAIN: 0
DIARRHEA: 0
CHEST TIGHTNESS: 0
BACK PAIN: 0
WHEEZING: 0
COUGH: 0
SORE THROAT: 0
NAUSEA: 0
BLOOD IN STOOL: 0
ABDOMINAL DISTENTION: 0
VOICE CHANGE: 0
CONSTIPATION: 0
SHORTNESS OF BREATH: 0
SINUS PRESSURE: 0
TROUBLE SWALLOWING: 0
VOMITING: 0

## 2024-11-12 NOTE — PROGRESS NOTES
PRAPARE - Transportation     Lack of Transportation (Medical): Not on file     Lack of Transportation (Non-Medical): No   Physical Activity: Sufficiently Active (7/8/2024)    Exercise Vital Sign     Days of Exercise per Week: 5 days     Minutes of Exercise per Session: 30 min   Stress: No Stress Concern Present (5/30/2023)    [employer] [place] [employer] - Occupational Stress Questionnaire     Feeling of Stress : Only a little   Social Connections: Moderately Integrated (5/30/2023)    Social Connection and Isolation Panel [NHANES]     Frequency of Communication with Friends and Family: More than three times a week     Frequency of Social Gatherings with Friends and Family: More than three times a week     Attends Sikhism Services: More than 4 times per year     Active Member of Clubs or Organizations: No     Attends Club or Organization Meetings: 1 to 4 times per year     Marital Status:    Intimate Partner Violence: Unknown (1/21/2024)    Received from [employer], [employer]    Interpersonal Safety     Feel physically or emotionally unsafe where currently live: Not on file     Harm by anyone: Not on file     Emotionally Harmed: Not on file   Housing Stability: Unknown (4/2/2024)    Housing Stability Vital Sign     Unable to Pay for Housing in the Last Year: Not on file     Number of Places Lived in the Last Year: Not on file     Unstable Housing in the Last Year: No      Family History   Problem Relation Age of Onset    Diabetes Mother     Heart Disease Father     Coronary Art Dis Brother        PE  Vitals:    11/12/24 1057   BP: 116/72   Site: Left Upper Arm   Position: Sitting   Pulse: (!) 46   Temp: 98.1 °F (36.7 °C)   SpO2: 93%   Weight: 83.4 kg (183 lb 12.8 oz)     Estimated body mass index is 24.93 kg/m² as calculated from the following:    Height as of 9/30/24: 1.829 m (6').    Weight as of this encounter: 83.4 kg (183 lb 12.8

## 2024-11-13 RX ORDER — CLINDAMYCIN PHOSPHATE 10 MG/G
GEL TOPICAL
Qty: 30 G | Refills: 2 | Status: SHIPPED | OUTPATIENT
Start: 2024-11-13

## 2024-11-24 ENCOUNTER — HOSPITAL ENCOUNTER (EMERGENCY)
Age: 74
Discharge: HOME | End: 2024-11-24
Attending: EMERGENCY MEDICINE
Payer: MEDICARE

## 2024-11-24 VITALS
DIASTOLIC BLOOD PRESSURE: 79 MMHG | TEMPERATURE: 98 F | HEART RATE: 53 BPM | SYSTOLIC BLOOD PRESSURE: 152 MMHG | BODY MASS INDEX: 23.45 KG/M2 | RESPIRATION RATE: 18 BRPM | WEIGHT: 172.9 LBS | OXYGEN SATURATION: 99 %

## 2024-11-24 DIAGNOSIS — I10 HYPERTENSION, UNSPECIFIED TYPE: Primary | ICD-10-CM

## 2024-11-24 LAB
ANION GAP SERPL CALCULATED.3IONS-SCNC: 7 MMOL/L (ref 3–16)
BASOPHILS # BLD: 0 K/UL (ref 0–0.2)
BASOPHILS NFR BLD: 0.2 %
BILIRUB UR QL STRIP.AUTO: NEGATIVE
BUN SERPL-MCNC: 18 MG/DL (ref 7–20)
CALCIUM SERPL-MCNC: 9.4 MG/DL (ref 8.3–10.6)
CHLORIDE SERPL-SCNC: 106 MMOL/L (ref 99–110)
CLARITY UR: CLEAR
CO2 SERPL-SCNC: 30 MMOL/L (ref 21–32)
COLOR UR: YELLOW
CREAT SERPL-MCNC: 0.7 MG/DL (ref 0.8–1.3)
DEPRECATED RDW RBC AUTO: 12.7 % (ref 12.4–15.4)
EKG ATRIAL RATE: 49 BPM
EKG DIAGNOSIS: NORMAL
EKG P AXIS: 53 DEGREES
EKG P-R INTERVAL: 188 MS
EKG Q-T INTERVAL: 456 MS
EKG QRS DURATION: 90 MS
EKG QTC CALCULATION (BAZETT): 411 MS
EKG R AXIS: 18 DEGREES
EKG T AXIS: 20 DEGREES
EKG VENTRICULAR RATE: 49 BPM
EOSINOPHIL # BLD: 0.1 K/UL (ref 0–0.6)
EOSINOPHIL NFR BLD: 1.7 %
GFR SERPLBLD CREATININE-BSD FMLA CKD-EPI: >90 ML/MIN/{1.73_M2}
GLUCOSE SERPL-MCNC: 129 MG/DL (ref 70–99)
GLUCOSE UR STRIP.AUTO-MCNC: NEGATIVE MG/DL
HCT VFR BLD AUTO: 44.7 % (ref 40.5–52.5)
HGB BLD-MCNC: 15.2 G/DL (ref 13.5–17.5)
HGB UR QL STRIP.AUTO: NEGATIVE
KETONES UR STRIP.AUTO-MCNC: NEGATIVE MG/DL
LEUKOCYTE ESTERASE UR QL STRIP.AUTO: NEGATIVE
LYMPHOCYTES # BLD: 0.9 K/UL (ref 1–5.1)
LYMPHOCYTES NFR BLD: 17.3 %
MCH RBC QN AUTO: 31.9 PG (ref 26–34)
MCHC RBC AUTO-ENTMCNC: 33.9 G/DL (ref 31–36)
MCV RBC AUTO: 94 FL (ref 80–100)
MONOCYTES # BLD: 0.3 K/UL (ref 0–1.3)
MONOCYTES NFR BLD: 5.2 %
NEUTROPHILS # BLD: 3.9 K/UL (ref 1.7–7.7)
NEUTROPHILS NFR BLD: 75.6 %
NITRITE UR QL STRIP.AUTO: NEGATIVE
PH UR STRIP.AUTO: 7 [PH] (ref 5–8)
PLATELET # BLD AUTO: 196 K/UL (ref 135–450)
PMV BLD AUTO: 8.6 FL (ref 5–10.5)
POTASSIUM SERPL-SCNC: 4.3 MMOL/L (ref 3.5–5.1)
PROT UR STRIP.AUTO-MCNC: NEGATIVE MG/DL
RBC # BLD AUTO: 4.76 M/UL (ref 4.2–5.9)
SODIUM SERPL-SCNC: 143 MMOL/L (ref 136–145)
SP GR UR STRIP.AUTO: 1.01 (ref 1–1.03)
TROPONIN, HIGH SENSITIVITY: 12 NG/L (ref 0–22)
TROPONIN, HIGH SENSITIVITY: 9 NG/L (ref 0–22)
UA COMPLETE W REFLEX CULTURE PNL UR: NORMAL
UA DIPSTICK W REFLEX MICRO PNL UR: NORMAL
URN SPEC COLLECT METH UR: NORMAL
UROBILINOGEN UR STRIP-ACNC: 0.2 E.U./DL
WBC # BLD AUTO: 5.1 K/UL (ref 4–11)

## 2024-11-24 PROCEDURE — 80048 BASIC METABOLIC PNL TOTAL CA: CPT

## 2024-11-24 PROCEDURE — 99284 EMERGENCY DEPT VISIT MOD MDM: CPT

## 2024-11-24 PROCEDURE — 84484 ASSAY OF TROPONIN QUANT: CPT

## 2024-11-24 PROCEDURE — 93005 ELECTROCARDIOGRAM TRACING: CPT | Performed by: PHYSICIAN ASSISTANT

## 2024-11-24 PROCEDURE — 81003 URINALYSIS AUTO W/O SCOPE: CPT

## 2024-11-24 PROCEDURE — 85025 COMPLETE CBC W/AUTO DIFF WBC: CPT

## 2024-11-24 ASSESSMENT — PAIN - FUNCTIONAL ASSESSMENT: PAIN_FUNCTIONAL_ASSESSMENT: NONE - DENIES PAIN

## 2024-11-24 NOTE — ED PROVIDER NOTES
ED Attending Attestation Note     Date of evaluation: 11/24/2024    This patient was seen by the advance practice provider.  I have seen and examined the patient, agree with the workup, evaluation, management and diagnosis. The care plan has been discussed.  I have reviewed the ECG and concur with the LUCAS's interpretation.  My assessment reveals patient is awake and alert, no acute distress.  Face symmetric, speech clear, denies chest pain, abdominal pain, shortness of breath.  Abdomen soft without rebound guarding or distention.  Has had increased stressors recently.  He has not had his medications adjusted in at least 6 months.     Jace Cooper MD  11/24/24 5817    
Mouth/Throat:      Mouth: Mucous membranes are moist.   Eyes:      General:         Right eye: No discharge.         Left eye: No discharge.      Extraocular Movements: Extraocular movements intact.      Conjunctiva/sclera: Conjunctivae normal.      Pupils: Pupils are equal, round, and reactive to light.   Cardiovascular:      Rate and Rhythm: Normal rate and regular rhythm.      Pulses: Normal pulses.      Heart sounds: Normal heart sounds. No murmur heard.  Pulmonary:      Effort: Pulmonary effort is normal.      Breath sounds: Normal breath sounds. No wheezing or rhonchi.   Abdominal:      General: Bowel sounds are normal.      Palpations: Abdomen is soft.      Tenderness: There is no abdominal tenderness. There is no guarding or rebound.   Musculoskeletal:         General: Normal range of motion.      Cervical back: Normal range of motion and neck supple.      Comments: Distal pulses 2+ bilaterally of upper and lower extremities   Skin:     General: Skin is warm and dry.      Capillary Refill: Capillary refill takes less than 2 seconds.   Neurological:      General: No focal deficit present.      Mental Status: He is alert and oriented to person, place, and time.      Sensory: No sensory deficit.      Deep Tendon Reflexes: Reflexes normal.      Comments: 5 out of 5 strength of bilateral upper and lower extremities.  Intact sensation throughout.  He is able to ambulate with a steady gait.  No nystagmus on examination   Psychiatric:         Mood and Affect: Mood normal.         Behavior: Behavior normal.         Thought Content: Thought content normal.         Judgment: Judgment normal.          Kaitlynn Medrano PA  11/24/24 4942

## 2024-11-24 NOTE — DISCHARGE INSTRUCTIONS
-continue home medications as prescribed  -call either your cardiologist or your PCP to discuss your blood pressure medication to see if they want to make any changes  -return for worsening symptoms such as fevers of 100.5 or greater not relieved by Tylenol or ibuprofen, chest pain, blurry vision, numbness/tingling of your extremities, dizziness or other concerns

## 2024-11-25 ENCOUNTER — CARE COORDINATION (OUTPATIENT)
Dept: CARE COORDINATION | Age: 74
End: 2024-11-25

## 2024-11-25 NOTE — CARE COORDINATION
Ambulatory Care Coordination Note     2024 11:37 AM     Patient Current Location:  Home: [address]  [place] [address]     This patient was received as a referral from [facility] .    ACM contacted the patient by telephone. Verified name and  with patient as identifiers. Provided introduction to self, and explanation of the ACM role.   Patient declined care management services at this time.          ACM: Christina Summerlin     Challenges to be reviewed by the provider   Additional needs identified to be addressed with provider No  none               Method of communication with provider: none.    Utilization: N/A - Initial Call     Care Summary Note: ACM completed enrollment / emergency department follow-up with the patient today. The patient was seen in the ED for elevated BP. This ACM inquired about who manages he BP and he stated that his PCP and the cardiologist both manages his condition. He stated that he will contact his cardiologist regarding this incident. This ACM offered care management to the patient and he declined stating that he has no needs or concerns currently. This ACM is open to enrolling the patient into HRCM if the needs arises in the future. At this time the ACM will sign off.         Assessments Completed:   General Assessment    Do you have any symptoms that are causing concern?: Yes  Progression since Onset: Gradually Improving  Reported Symptoms: Other (Comment: Elevated BP; Seen in the ED ; will contact his cardiologist)              PCP/Specialist follow up:   Future Appointments         Provider Specialty Dept Phone    2025 9:00 AM Gaurang Mo MD Cardiology [phone]    2025 11:00 AM aJcki Singleton MD Internal Medicine [phone]            Follow Up:   No further Ambulatory Care Management follow-up scheduled at this time.  Patient  has Ambulatory Care Manager's contact information for any further questions, concerns or

## 2024-12-22 DIAGNOSIS — F51.01 PRIMARY INSOMNIA: ICD-10-CM

## 2024-12-24 RX ORDER — TRIAZOLAM 0.25 MG
TABLET ORAL
Qty: 30 TABLET | Refills: 1 | Status: SHIPPED | OUTPATIENT
Start: 2024-12-24 | End: 2025-01-23

## 2024-12-31 ENCOUNTER — APPOINTMENT (OUTPATIENT)
Dept: GENERAL RADIOLOGY | Age: 74
End: 2024-12-31
Payer: MEDICARE

## 2024-12-31 ENCOUNTER — HOSPITAL ENCOUNTER (EMERGENCY)
Age: 74
Discharge: HOME | End: 2024-12-31
Attending: EMERGENCY MEDICINE
Payer: MEDICARE

## 2024-12-31 VITALS
BODY MASS INDEX: 24.41 KG/M2 | OXYGEN SATURATION: 97 % | DIASTOLIC BLOOD PRESSURE: 77 MMHG | TEMPERATURE: 97.6 F | SYSTOLIC BLOOD PRESSURE: 141 MMHG | RESPIRATION RATE: 21 BRPM | WEIGHT: 180.2 LBS | HEART RATE: 46 BPM | HEIGHT: 72 IN

## 2024-12-31 DIAGNOSIS — R07.9 CHEST PAIN, UNSPECIFIED TYPE: Primary | ICD-10-CM

## 2024-12-31 LAB
ANION GAP SERPL CALCULATED.3IONS-SCNC: 11 MMOL/L (ref 3–16)
BASOPHILS # BLD: 0 K/UL (ref 0–0.2)
BASOPHILS NFR BLD: 0.2 %
BUN SERPL-MCNC: 18 MG/DL (ref 7–20)
CALCIUM SERPL-MCNC: 9.7 MG/DL (ref 8.3–10.6)
CHLORIDE SERPL-SCNC: 102 MMOL/L (ref 99–110)
CO2 SERPL-SCNC: 28 MMOL/L (ref 21–32)
CREAT SERPL-MCNC: 0.9 MG/DL (ref 0.8–1.3)
DEPRECATED RDW RBC AUTO: 13 % (ref 12.4–15.4)
EKG ATRIAL RATE: 49 BPM
EKG DIAGNOSIS: NORMAL
EKG P AXIS: 25 DEGREES
EKG P-R INTERVAL: 186 MS
EKG Q-T INTERVAL: 456 MS
EKG QRS DURATION: 88 MS
EKG QTC CALCULATION (BAZETT): 411 MS
EKG R AXIS: -2 DEGREES
EKG T AXIS: 13 DEGREES
EKG VENTRICULAR RATE: 49 BPM
EOSINOPHIL # BLD: 0.1 K/UL (ref 0–0.6)
EOSINOPHIL NFR BLD: 2.8 %
GFR SERPLBLD CREATININE-BSD FMLA CKD-EPI: 90 ML/MIN/{1.73_M2}
GLUCOSE SERPL-MCNC: 118 MG/DL (ref 70–99)
HCT VFR BLD AUTO: 44.6 % (ref 40.5–52.5)
HGB BLD-MCNC: 15.3 G/DL (ref 13.5–17.5)
LYMPHOCYTES # BLD: 1.1 K/UL (ref 1–5.1)
LYMPHOCYTES NFR BLD: 21.8 %
MCH RBC QN AUTO: 32.3 PG (ref 26–34)
MCHC RBC AUTO-ENTMCNC: 34.3 G/DL (ref 31–36)
MCV RBC AUTO: 94.2 FL (ref 80–100)
MONOCYTES # BLD: 0.4 K/UL (ref 0–1.3)
MONOCYTES NFR BLD: 7.3 %
NEUTROPHILS # BLD: 3.4 K/UL (ref 1.7–7.7)
NEUTROPHILS NFR BLD: 67.9 %
NT-PROBNP SERPL-MCNC: 123 PG/ML (ref 0–124)
PLATELET # BLD AUTO: 210 K/UL (ref 135–450)
PMV BLD AUTO: 9.3 FL (ref 5–10.5)
POTASSIUM SERPL-SCNC: 4.1 MMOL/L (ref 3.5–5.1)
RBC # BLD AUTO: 4.73 M/UL (ref 4.2–5.9)
SODIUM SERPL-SCNC: 141 MMOL/L (ref 136–145)
TROPONIN, HIGH SENSITIVITY: 7 NG/L (ref 0–22)
TROPONIN, HIGH SENSITIVITY: <6 NG/L (ref 0–22)
WBC # BLD AUTO: 5 K/UL (ref 4–11)

## 2024-12-31 PROCEDURE — 85025 COMPLETE CBC W/AUTO DIFF WBC: CPT

## 2024-12-31 PROCEDURE — 83880 ASSAY OF NATRIURETIC PEPTIDE: CPT

## 2024-12-31 PROCEDURE — 99285 EMERGENCY DEPT VISIT HI MDM: CPT

## 2024-12-31 PROCEDURE — 36415 COLL VENOUS BLD VENIPUNCTURE: CPT

## 2024-12-31 PROCEDURE — 84484 ASSAY OF TROPONIN QUANT: CPT

## 2024-12-31 PROCEDURE — 80048 BASIC METABOLIC PNL TOTAL CA: CPT

## 2024-12-31 PROCEDURE — 71046 X-RAY EXAM CHEST 2 VIEWS: CPT

## 2024-12-31 PROCEDURE — 6370000000 HC RX 637 (ALT 250 FOR IP): Performed by: EMERGENCY MEDICINE

## 2024-12-31 PROCEDURE — 93005 ELECTROCARDIOGRAM TRACING: CPT | Performed by: EMERGENCY MEDICINE

## 2024-12-31 RX ORDER — ASPIRIN 81 MG/1
324 TABLET, CHEWABLE ORAL ONCE
Status: COMPLETED | OUTPATIENT
Start: 2024-12-31 | End: 2024-12-31

## 2024-12-31 RX ADMIN — LIDOCAINE HYDROCHLORIDE: 20 SOLUTION ORAL at 12:22

## 2024-12-31 RX ADMIN — ASPIRIN 324 MG: 81 TABLET, CHEWABLE ORAL at 10:34

## 2024-12-31 ASSESSMENT — PAIN DESCRIPTION - LOCATION: LOCATION: CHEST

## 2024-12-31 ASSESSMENT — PAIN SCALES - GENERAL: PAINLEVEL_OUTOF10: 3

## 2024-12-31 NOTE — ED PROVIDER NOTES
LABS:   Results for orders placed or performed during the hospital encounter of 12/31/24   CBC with Auto Differential   Result Value Ref Range    WBC 5.0 4.0 - 11.0 K/uL    RBC 4.73 4.20 - 5.90 M/uL    Hemoglobin 15.3 13.5 - 17.5 g/dL    Hematocrit 44.6 40.5 - 52.5 %    MCV 94.2 80.0 - 100.0 fL    MCH 32.3 26.0 - 34.0 pg    MCHC 34.3 31.0 - 36.0 g/dL    RDW 13.0 12.4 - 15.4 %    Platelets 210 135 - 450 K/uL    MPV 9.3 5.0 - 10.5 fL    Neutrophils % 67.9 %    Lymphocytes % 21.8 %    Monocytes % 7.3 %    Eosinophils % 2.8 %    Basophils % 0.2 %    Neutrophils Absolute 3.4 1.7 - 7.7 K/uL    Lymphocytes Absolute 1.1 1.0 - 5.1 K/uL    Monocytes Absolute 0.4 0.0 - 1.3 K/uL    Eosinophils Absolute 0.1 0.0 - 0.6 K/uL    Basophils Absolute 0.0 0.0 - 0.2 K/uL   BMP w/ Reflex to MG   Result Value Ref Range    Sodium 141 136 - 145 mmol/L    Potassium reflex Magnesium 4.1 3.5 - 5.1 mmol/L    Chloride 102 99 - 110 mmol/L    CO2 28 21 - 32 mmol/L    Anion Gap 11 3 - 16    Glucose 118 (H) 70 - 99 mg/dL    BUN 18 7 - 20 mg/dL    Creatinine 0.9 0.8 - 1.3 mg/dL    Est, Glom Filt Rate 90 >60    Calcium 9.7 8.3 - 10.6 mg/dL   Troponin   Result Value Ref Range    Troponin, High Sensitivity <6 0 - 22 ng/L   Troponin   Result Value Ref Range    Troponin, High Sensitivity 7 0 - 22 ng/L   BNP   Result Value Ref Range    NT Pro- 0 - 124 pg/mL   EKG 12 Lead   Result Value Ref Range    Ventricular Rate 49 BPM    Atrial Rate 49 BPM    P-R Interval 186 ms    QRS Duration 88 ms    Q-T Interval 456 ms    QTc Calculation (Bazett) 411 ms    P Axis 25 degrees    R Axis -2 degrees    T Axis 13 degrees    Diagnosis       Sinus bradycardiaOtherwise normal ECGEKG performed in ER and to be interpreted by ER physician.Confirmed by MD, ER ([id number]),  CHERYL FONTANA ([id number]) on 12/31/2024 9:38:00 AM       CONSULTS:  None    PATIENT REFERRED TO:  [facility] Emergency Department  [facility] 
No

## 2024-12-31 NOTE — DISCHARGE INSTRUCTIONS
You were seen in the emergency department today for chest pain.  You did not have any signs of a heart attack, heart failure, or any concerning findings on your chest x-ray.  With this it is safe for you to go home.  Please follow-up with your cardiologist at your appointment on January 16 or return to the ED if your symptoms are worsening.

## 2025-01-02 ENCOUNTER — CARE COORDINATION (OUTPATIENT)
Dept: CARE COORDINATION | Age: 75
End: 2025-01-02

## 2025-01-02 NOTE — CARE COORDINATION
Ambulatory Care Coordination Note     1/2/2025 8:38 AM     Patient outreach attempt by this ACM today to offer care management services. ACM was unable to reach the patient by telephone today;   hospital follow up call within 2 business days of discharge: Yes  left voice message requesting a return phone call to this ACM.  mychart message sent requesting patient to contact this AC.     ACM: Christina Summerlin     Care Summary Note: First unsuccessful attempt.    PCP/Specialist follow up:   Future Appointments         Provider Specialty Dept Phone    1/16/2025 9:00 AM Gaurang Mo MD Cardiology [phone]    1/21/2025 11:00 AM Jacki Singleton MD Internal Medicine [phone]            Follow Up:   Plan for next ACM outreach in approximately 1-2 days  to complete:  - outreach attempt to offer care management services.

## 2025-01-03 ENCOUNTER — CARE COORDINATION (OUTPATIENT)
Dept: CARE COORDINATION | Age: 75
End: 2025-01-03

## 2025-01-03 NOTE — CARE COORDINATION
Ambulatory Care Coordination Note     1/3/2025 9:03 AM     patient outreach attempt by this AC today to offer care management services. AC was unable to reach the patient by telephone today;   left voice message requesting a return phone call to this ACM.     Patient closed (unable to reach patient) from the High Risk Care Management program on 1/3/2025.  Patient has the ability to self manage at this time..  Care management goals have been completed. No further Ambulatory Care Manager follow up scheduled.

## 2025-01-08 DIAGNOSIS — K21.9 GASTROESOPHAGEAL REFLUX DISEASE WITHOUT ESOPHAGITIS: ICD-10-CM

## 2025-01-08 DIAGNOSIS — E78.5 HYPERLIPIDEMIA LDL GOAL <70: ICD-10-CM

## 2025-01-08 LAB
ALBUMIN SERPL-MCNC: 4.7 G/DL (ref 3.4–5)
ALBUMIN/GLOB SERPL: 2.1 {RATIO} (ref 1.1–2.2)
ALP SERPL-CCNC: 68 U/L (ref 40–129)
ALT SERPL-CCNC: 34 U/L (ref 10–40)
ANION GAP SERPL CALCULATED.3IONS-SCNC: 10 MMOL/L (ref 3–16)
AST SERPL-CCNC: 27 U/L (ref 15–37)
BILIRUB SERPL-MCNC: 0.3 MG/DL (ref 0–1)
BUN SERPL-MCNC: 16 MG/DL (ref 7–20)
CALCIUM SERPL-MCNC: 9.5 MG/DL (ref 8.3–10.6)
CHLORIDE SERPL-SCNC: 100 MMOL/L (ref 99–110)
CHOLEST SERPL-MCNC: 111 MG/DL (ref 0–199)
CO2 SERPL-SCNC: 31 MMOL/L (ref 21–32)
CREAT SERPL-MCNC: 1 MG/DL (ref 0.8–1.3)
DEPRECATED RDW RBC AUTO: 13.3 % (ref 12.4–15.4)
GFR SERPLBLD CREATININE-BSD FMLA CKD-EPI: 79 ML/MIN/{1.73_M2}
GLUCOSE SERPL-MCNC: 115 MG/DL (ref 70–99)
HCT VFR BLD AUTO: 44.3 % (ref 40.5–52.5)
HDLC SERPL-MCNC: 44 MG/DL (ref 40–60)
HGB BLD-MCNC: 15.1 G/DL (ref 13.5–17.5)
LDL CHOLESTEROL: 33 MG/DL
MCH RBC QN AUTO: 32.9 PG (ref 26–34)
MCHC RBC AUTO-ENTMCNC: 34 G/DL (ref 31–36)
MCV RBC AUTO: 96.6 FL (ref 80–100)
PLATELET # BLD AUTO: 213 K/UL (ref 135–450)
PMV BLD AUTO: 9.4 FL (ref 5–10.5)
POTASSIUM SERPL-SCNC: 4.3 MMOL/L (ref 3.5–5.1)
PROT SERPL-MCNC: 6.9 G/DL (ref 6.4–8.2)
RBC # BLD AUTO: 4.59 M/UL (ref 4.2–5.9)
SODIUM SERPL-SCNC: 141 MMOL/L (ref 136–145)
TRIGL SERPL-MCNC: 168 MG/DL (ref 0–150)
VLDLC SERPL CALC-MCNC: 34 MG/DL
WBC # BLD AUTO: 5.3 K/UL (ref 4–11)

## 2025-01-10 NOTE — RESULT ENCOUNTER NOTE
Please notify patient that their lab results are close to normal. Sugar levels and triglycerides are slightly high.  We will discuss this more at the visit.

## 2025-01-13 NOTE — PATIENT INSTRUCTIONS
Thank you for choosing [facility] for your cardiac care.    During your visit today, we reviewed and confirmed your cardiac medications along with  medication prescribed by your other healthcare team members. Please be sure to discuss any  changes to medication with your providers.    Please bring a list of ALL medications (or the bottles) with you to EVERY appointment.  Also include vitamins and over-the-counter medications.    If you need refills for any cardiac medications, please call your pharmacy and they will reach out to us electronically.    Did your provider order testing today? If yes, then you will receive your results in three  possible ways. You can receive a [employer] message, a phone call, or letter in the mail. Please  note, if you are an active [employer] user, some of your testing will be available within 1-2 days.    Finally, please know that it is good for your heart to exercise and follow a healthy, low-fat diet  as advised by your physician and health care providers.    If you are experiencing a medical emergency, please call 911 immediately.    It's easy to register for a [employer] account if you don't already have one. With a [employer]  account you can manage your health record, view test results, schedule appointments and  more.     Dr. Mo's clinical staff can be reached at the following phone number: [phone]    If any cardiac testing is ordered, please contact central scheduling at [phone] to get your test scheduled.         Try drinking some ice cold water when having your chest pain

## 2025-01-13 NOTE — PROGRESS NOTES
MD ALIYA at [facility] ENDOSCOPY    CORONARY ARTERY BYPASS GRAFT      2 Vessels     ESOPHAGEAL DILATATION      ESOPHAGOSCOPY N/A 2022    ESOPHAGOSCOPY BIOPSY performed by Sam PISANO MD at [facility] ENDOSCOPY    INGUINAL HERNIA REPAIR Right     PROSTATE SURGERY  2017    TURP        Social HIstory  Social History     Tobacco Use    Smoking status: Former     Current packs/day: 0.00     Average packs/day: 1 pack/day for 50.0 years (50.0 ttl pk-yrs)     Types: Cigarettes     Start date: 1969     Quit date: 2019     Years since quittin.4     Passive exposure: Never    Smokeless tobacco: Never   Vaping Use    Vaping status: Never Used   Substance Use Topics    Alcohol use: Not Currently     Comment: Rarely    Drug use: Never       Family History  Family History   Problem Relation Age of Onset    Diabetes Mother     Heart Disease Father     Coronary Art Dis Brother        Allergies   No Known Allergies    Medications:     Home Medications:  Were reviewed and are listed in nursing record and/or listed below    Prior to Admission medications    Medication Sig Start Date End Date Taking? Authorizing Provider   metoprolol succinate (TOPROL XL) 25 MG extended release tablet TAKE ONE TABLET BY MOUTH DAILY 25  Yes Gaurang Mo MD   triazolam (HALCION) 0.25 MG tablet TAKE ONE TABLET BY MOUTH ONCE NIGHTLY AS NEEDED FOR INSOMNIA FOR UP TO 30 DAYS 24 Yes Jacki Singleton MD   clindamycin 1 % gel APPLY TOPICALLY TWO TIMES A DAY 24  Yes Jacki Singleton MD   butalbital-acetaminophen-caffeine (FIORICET, ESGIC) -40 MG per tablet Take 1 tablet by mouth every 4 hours as needed for Headaches Max Daily Amount: 6 tablets 24  Yes Jacki Singleton MD   meclizine (ANTIVERT) 25 MG tablet TAKE 1 TABLET BY MOUTH 3 TIMES A DAY AS NEEDED FOR DIZZINESS 10/15/24  Yes Jacki Singleton MD   dicyclomine (BENTYL) 20 MG tablet TAKE ONE TABLET BY MOUTH THREE TIMES A DAY AS NEEDED FOR ABDOMINAL CRAMPING

## 2025-01-16 ENCOUNTER — OFFICE VISIT (OUTPATIENT)
Dept: CARDIOLOGY CLINIC | Age: 75
End: 2025-01-16
Payer: MEDICARE

## 2025-01-16 VITALS
BODY MASS INDEX: 23.92 KG/M2 | SYSTOLIC BLOOD PRESSURE: 120 MMHG | WEIGHT: 176.4 LBS | DIASTOLIC BLOOD PRESSURE: 70 MMHG | HEART RATE: 62 BPM

## 2025-01-16 DIAGNOSIS — I25.10 CORONARY ARTERY DISEASE INVOLVING NATIVE CORONARY ARTERY OF NATIVE HEART WITHOUT ANGINA PECTORIS: Primary | ICD-10-CM

## 2025-01-16 DIAGNOSIS — E78.5 HYPERLIPIDEMIA, UNSPECIFIED HYPERLIPIDEMIA TYPE: ICD-10-CM

## 2025-01-16 PROCEDURE — 1160F RVW MEDS BY RX/DR IN RCRD: CPT | Performed by: INTERNAL MEDICINE

## 2025-01-16 PROCEDURE — 1123F ACP DISCUSS/DSCN MKR DOCD: CPT | Performed by: INTERNAL MEDICINE

## 2025-01-16 PROCEDURE — 99214 OFFICE O/P EST MOD 30 MIN: CPT | Performed by: INTERNAL MEDICINE

## 2025-01-16 PROCEDURE — 1159F MED LIST DOCD IN RCRD: CPT | Performed by: INTERNAL MEDICINE

## 2025-01-16 RX ORDER — METOPROLOL SUCCINATE 25 MG/1
TABLET, EXTENDED RELEASE ORAL
Qty: 90 TABLET | Refills: 3 | Status: SHIPPED | OUTPATIENT
Start: 2025-01-16

## 2025-01-16 RX ORDER — ISOSORBIDE MONONITRATE 30 MG/1
30 TABLET, EXTENDED RELEASE ORAL DAILY
Qty: 30 TABLET | Refills: 0 | Status: SHIPPED | OUTPATIENT
Start: 2025-01-16

## 2025-01-18 SDOH — ECONOMIC STABILITY: FOOD INSECURITY: WITHIN THE PAST 12 MONTHS, THE FOOD YOU BOUGHT JUST DIDN'T LAST AND YOU DIDN'T HAVE MONEY TO GET MORE.: NEVER TRUE

## 2025-01-18 SDOH — ECONOMIC STABILITY: FOOD INSECURITY: WITHIN THE PAST 12 MONTHS, YOU WORRIED THAT YOUR FOOD WOULD RUN OUT BEFORE YOU GOT MONEY TO BUY MORE.: NEVER TRUE

## 2025-01-18 SDOH — ECONOMIC STABILITY: INCOME INSECURITY: IN THE LAST 12 MONTHS, WAS THERE A TIME WHEN YOU WERE NOT ABLE TO PAY THE MORTGAGE OR RENT ON TIME?: NO

## 2025-01-18 ASSESSMENT — PATIENT HEALTH QUESTIONNAIRE - PHQ9
1. LITTLE INTEREST OR PLEASURE IN DOING THINGS: SEVERAL DAYS
SUM OF ALL RESPONSES TO PHQ QUESTIONS 1-9: 2
SUM OF ALL RESPONSES TO PHQ QUESTIONS 1-9: 2
2. FEELING DOWN, DEPRESSED OR HOPELESS: SEVERAL DAYS
SUM OF ALL RESPONSES TO PHQ QUESTIONS 1-9: 2
1. LITTLE INTEREST OR PLEASURE IN DOING THINGS: SEVERAL DAYS
SUM OF ALL RESPONSES TO PHQ9 QUESTIONS 1 & 2: 2
2. FEELING DOWN, DEPRESSED OR HOPELESS: SEVERAL DAYS
SUM OF ALL RESPONSES TO PHQ QUESTIONS 1-9: 2
SUM OF ALL RESPONSES TO PHQ9 QUESTIONS 1 & 2: 2

## 2025-01-20 SDOH — HEALTH STABILITY: PHYSICAL HEALTH: ON AVERAGE, HOW MANY MINUTES DO YOU ENGAGE IN EXERCISE AT THIS LEVEL?: 40 MIN

## 2025-01-20 SDOH — HEALTH STABILITY: PHYSICAL HEALTH: ON AVERAGE, HOW MANY DAYS PER WEEK DO YOU ENGAGE IN MODERATE TO STRENUOUS EXERCISE (LIKE A BRISK WALK)?: 4 DAYS

## 2025-01-20 ASSESSMENT — LIFESTYLE VARIABLES
HOW MANY STANDARD DRINKS CONTAINING ALCOHOL DO YOU HAVE ON A TYPICAL DAY: PATIENT DOES NOT DRINK
HOW OFTEN DO YOU HAVE A DRINK CONTAINING ALCOHOL: PATIENT DECLINED
HOW OFTEN DO YOU HAVE SIX OR MORE DRINKS ON ONE OCCASION: 1
HOW MANY STANDARD DRINKS CONTAINING ALCOHOL DO YOU HAVE ON A TYPICAL DAY: 0
HOW OFTEN DO YOU HAVE A DRINK CONTAINING ALCOHOL: 98

## 2025-01-21 ENCOUNTER — OFFICE VISIT (OUTPATIENT)
Dept: INTERNAL MEDICINE CLINIC | Age: 75
End: 2025-01-21
Payer: MEDICARE

## 2025-01-21 VITALS
TEMPERATURE: 97 F | WEIGHT: 179 LBS | DIASTOLIC BLOOD PRESSURE: 74 MMHG | SYSTOLIC BLOOD PRESSURE: 136 MMHG | BODY MASS INDEX: 24.24 KG/M2 | HEART RATE: 51 BPM | HEIGHT: 72 IN | OXYGEN SATURATION: 98 %

## 2025-01-21 DIAGNOSIS — E78.5 HYPERLIPIDEMIA LDL GOAL <70: ICD-10-CM

## 2025-01-21 DIAGNOSIS — R42 VERTIGO: ICD-10-CM

## 2025-01-21 DIAGNOSIS — Z00.00 MEDICARE ANNUAL WELLNESS VISIT, SUBSEQUENT: Primary | ICD-10-CM

## 2025-01-21 PROCEDURE — 99214 OFFICE O/P EST MOD 30 MIN: CPT | Performed by: HOSPITALIST

## 2025-01-21 PROCEDURE — 1123F ACP DISCUSS/DSCN MKR DOCD: CPT | Performed by: HOSPITALIST

## 2025-01-21 PROCEDURE — 1159F MED LIST DOCD IN RCRD: CPT | Performed by: HOSPITALIST

## 2025-01-21 PROCEDURE — G0439 PPPS, SUBSEQ VISIT: HCPCS | Performed by: HOSPITALIST

## 2025-01-21 RX ORDER — METOPROLOL SUCCINATE 25 MG/1
TABLET, EXTENDED RELEASE ORAL
Qty: 90 TABLET | Refills: 1 | Status: SHIPPED | OUTPATIENT
Start: 2025-01-21

## 2025-01-21 RX ORDER — MECLIZINE HYDROCHLORIDE 25 MG/1
TABLET ORAL
Qty: 30 TABLET | Refills: 1 | Status: SHIPPED | OUTPATIENT
Start: 2025-01-21

## 2025-01-21 RX ORDER — ROSUVASTATIN CALCIUM 20 MG/1
TABLET, COATED ORAL
Qty: 90 TABLET | Refills: 1 | Status: SHIPPED | OUTPATIENT
Start: 2025-01-21 | End: 2025-01-22 | Stop reason: SDUPTHER

## 2025-01-21 NOTE — PROGRESS NOTES
Medicare Annual Wellness Visit    Terry Roy is here for Medicare AWV and Other (Dry cough , sore throat  x 5 days )    Assessment & Plan   Medicare annual wellness visit, subsequent  Vertigo  The following orders have not been finalized:  -     meclizine (ANTIVERT) 25 MG tablet  Hyperlipidemia LDL goal <70  The following orders have not been finalized:  -     rosuvastatin (CRESTOR) 20 MG tablet       Return in about 1 year (around 1/21/2026) for AWV.     Subjective   The following acute and/or chronic problems were also addressed today:    Terry Roy is a 75 y.o. male with past medical history of COPD, CAD s/p CABG, hyperlipidemia, GERD, migraines, insomnia. If symptoms do not resolve, can consider CTA coronaries. Patient not keen for invasive angiogram.       ED follow-up  CAD status post CABG. Had ER visit with epigastric pain after drinking coffee.  Workup was negative.  He saw cardiology on 1/16/2025.  Imdur 30 mg daily was added.  He may have angiography if this is a persistent problem.  At this point, he has been feeling well and does not think he wants to pursue this.    Chronic Problems:  CAD/CABG/lipids  This is a chronic problem.  The disease course is stable.  Reviewed previous records and testing including myoview 7/12/22 and echo 7/10/2024.  Compliant with crestor 20 mg qd, toprol 25 mg qd, asa 81 mg qd.  Denies chest pain, palpitations, or leg swelling. He follows with Gaurang Mo MD.        Migraine with aura, not intractable  Patient feels this is diet related and precipitated by caffeine or chocolate intake. He has less than 1 migraine monthly. Sampled Nurtec in the past.  This did not seem to help much.  Continuing current lifestyle modifications recommended.       Lower abdominal pain  Has seen Dr. White.  Patient has had an EGD and colonoscopy by Dr. Sam White 10/5/2022 (colonoscopy) and 11/21/2022 (EGD).  No acute findings meriting change in the plan.  His symptoms

## 2025-01-21 NOTE — PATIENT INSTRUCTIONS

## 2025-01-22 ENCOUNTER — TELEPHONE (OUTPATIENT)
Dept: INTERNAL MEDICINE CLINIC | Age: 75
End: 2025-01-22

## 2025-01-22 DIAGNOSIS — K21.9 GASTROESOPHAGEAL REFLUX DISEASE WITHOUT ESOPHAGITIS: ICD-10-CM

## 2025-01-22 DIAGNOSIS — E78.5 HYPERLIPIDEMIA LDL GOAL <70: ICD-10-CM

## 2025-01-22 RX ORDER — ROSUVASTATIN CALCIUM 20 MG/1
20 TABLET, COATED ORAL DAILY
Qty: 90 TABLET | Refills: 1 | Status: SHIPPED | OUTPATIENT
Start: 2025-01-22

## 2025-01-22 RX ORDER — PANTOPRAZOLE SODIUM 40 MG/1
TABLET, DELAYED RELEASE ORAL
Qty: 90 TABLET | Refills: 1 | Status: SHIPPED | OUTPATIENT
Start: 2025-01-22

## 2025-01-22 NOTE — TELEPHONE ENCOUNTER
Pt is requesting a refill on medication below         pantoprazole (PROTONIX) 40 MG tablet [[id number]]     [id number] - [place], [place] - [address] - P [phone] - F [phone]

## 2025-01-22 NOTE — TELEPHONE ENCOUNTER
Pts pharmacy called due to needing a new script for medication below sent to them due to missing directions from yesterday script.       rosuvastatin (CRESTOR) 20 MG tablet [[id number]]     [id number] - [facility] [address] - P [phone] - F [phone]

## 2025-01-28 ENCOUNTER — TELEPHONE (OUTPATIENT)
Dept: INTERNAL MEDICINE CLINIC | Age: 75
End: 2025-01-28

## 2025-01-28 DIAGNOSIS — K21.9 GASTROESOPHAGEAL REFLUX DISEASE WITHOUT ESOPHAGITIS: ICD-10-CM

## 2025-01-28 RX ORDER — PANTOPRAZOLE SODIUM 40 MG/1
40 TABLET, DELAYED RELEASE ORAL DAILY
Qty: 90 TABLET | Refills: 1 | Status: SHIPPED | OUTPATIENT
Start: 2025-01-28 | End: 2025-01-28 | Stop reason: SDUPTHER

## 2025-01-28 NOTE — TELEPHONE ENCOUNTER
Patient was told by the pharmacy that they are waiting for instructions on the following since 01/22/25:    pantoprazole (PROTONIX) 40 MG tablet     Please send the instructions so he can get his refill.      [facility] #[id number] - [facility] [address] - P [phone] - F [phone]

## 2025-01-29 RX ORDER — PANTOPRAZOLE SODIUM 40 MG/1
40 TABLET, DELAYED RELEASE ORAL DAILY
Qty: 90 TABLET | Refills: 1 | Status: SHIPPED | OUTPATIENT
Start: 2025-01-29 | End: 2025-07-28

## 2025-03-11 DIAGNOSIS — F51.01 PRIMARY INSOMNIA: ICD-10-CM

## 2025-03-11 RX ORDER — TRIAZOLAM 0.25 MG
TABLET ORAL
Qty: 30 TABLET | Refills: 0 | Status: SHIPPED | OUTPATIENT
Start: 2025-03-11 | End: 2025-04-10

## 2025-03-28 DIAGNOSIS — R42 VERTIGO: ICD-10-CM

## 2025-03-28 RX ORDER — MECLIZINE HYDROCHLORIDE 25 MG/1
25 TABLET ORAL 3 TIMES DAILY PRN
Qty: 30 TABLET | Refills: 1 | Status: SHIPPED | OUTPATIENT
Start: 2025-03-28

## 2025-04-10 DIAGNOSIS — R42 VERTIGO: ICD-10-CM

## 2025-04-10 DIAGNOSIS — E78.5 HYPERLIPIDEMIA LDL GOAL <70: ICD-10-CM

## 2025-04-10 LAB
ALBUMIN SERPL-MCNC: 4.4 G/DL (ref 3.4–5)
ALBUMIN/GLOB SERPL: 1.9 {RATIO} (ref 1.1–2.2)
ALP SERPL-CCNC: 78 U/L (ref 40–129)
ALT SERPL-CCNC: 43 U/L (ref 10–40)
ANION GAP SERPL CALCULATED.3IONS-SCNC: 9 MMOL/L (ref 3–16)
AST SERPL-CCNC: 33 U/L (ref 15–37)
BILIRUB SERPL-MCNC: 0.5 MG/DL (ref 0–1)
BUN SERPL-MCNC: 17 MG/DL (ref 7–20)
CALCIUM SERPL-MCNC: 9.5 MG/DL (ref 8.3–10.6)
CHLORIDE SERPL-SCNC: 103 MMOL/L (ref 99–110)
CHOLEST SERPL-MCNC: 86 MG/DL (ref 0–199)
CO2 SERPL-SCNC: 31 MMOL/L (ref 21–32)
CREAT SERPL-MCNC: 0.8 MG/DL (ref 0.8–1.3)
GFR SERPLBLD CREATININE-BSD FMLA CKD-EPI: >90 ML/MIN/{1.73_M2}
GLUCOSE SERPL-MCNC: 99 MG/DL (ref 70–99)
HDLC SERPL-MCNC: 40 MG/DL (ref 40–60)
LDL CHOLESTEROL: 19 MG/DL
POTASSIUM SERPL-SCNC: 4.5 MMOL/L (ref 3.5–5.1)
PROT SERPL-MCNC: 6.7 G/DL (ref 6.4–8.2)
SODIUM SERPL-SCNC: 143 MMOL/L (ref 136–145)
TRIGL SERPL-MCNC: 135 MG/DL (ref 0–150)
VLDLC SERPL CALC-MCNC: 27 MG/DL

## 2025-04-11 ENCOUNTER — RESULTS FOLLOW-UP (OUTPATIENT)
Dept: INTERNAL MEDICINE CLINIC | Age: 75
End: 2025-04-11

## 2025-04-11 LAB
DEPRECATED RDW RBC AUTO: 12.3 % (ref 12.4–15.4)
HCT VFR BLD AUTO: 42.9 % (ref 40.5–52.5)
HGB BLD-MCNC: 14.7 G/DL (ref 13.5–17.5)
MCH RBC QN AUTO: 32.7 PG (ref 26–34)
MCHC RBC AUTO-ENTMCNC: 34.2 G/DL (ref 31–36)
MCV RBC AUTO: 95.4 FL (ref 80–100)
PLATELET # BLD AUTO: 188 K/UL (ref 135–450)
PMV BLD AUTO: 10.2 FL (ref 5–10.5)
RBC # BLD AUTO: 4.5 M/UL (ref 4.2–5.9)
WBC # BLD AUTO: 4.8 K/UL (ref 4–11)

## 2025-04-14 ENCOUNTER — OFFICE VISIT (OUTPATIENT)
Dept: INTERNAL MEDICINE CLINIC | Age: 75
End: 2025-04-14
Payer: MEDICARE

## 2025-04-14 ENCOUNTER — OFFICE VISIT (OUTPATIENT)
Dept: ENT CLINIC | Age: 75
End: 2025-04-14
Payer: MEDICARE

## 2025-04-14 VITALS
WEIGHT: 175.6 LBS | SYSTOLIC BLOOD PRESSURE: 128 MMHG | HEART RATE: 51 BPM | TEMPERATURE: 98 F | OXYGEN SATURATION: 97 % | DIASTOLIC BLOOD PRESSURE: 80 MMHG | BODY MASS INDEX: 23.82 KG/M2

## 2025-04-14 VITALS
WEIGHT: 176.8 LBS | DIASTOLIC BLOOD PRESSURE: 71 MMHG | SYSTOLIC BLOOD PRESSURE: 125 MMHG | BODY MASS INDEX: 23.43 KG/M2 | HEART RATE: 58 BPM | HEIGHT: 73 IN | TEMPERATURE: 127.4 F

## 2025-04-14 DIAGNOSIS — K21.9 GASTROESOPHAGEAL REFLUX DISEASE, UNSPECIFIED WHETHER ESOPHAGITIS PRESENT: ICD-10-CM

## 2025-04-14 DIAGNOSIS — R49.9 HOARSENESS OR CHANGING VOICE: Primary | ICD-10-CM

## 2025-04-14 DIAGNOSIS — R10.30 LOWER ABDOMINAL PAIN: ICD-10-CM

## 2025-04-14 DIAGNOSIS — R49.0 HOARSE VOICE QUALITY: Primary | ICD-10-CM

## 2025-04-14 DIAGNOSIS — K21.9 GASTROESOPHAGEAL REFLUX DISEASE WITHOUT ESOPHAGITIS: ICD-10-CM

## 2025-04-14 DIAGNOSIS — R63.4 WEIGHT LOSS: ICD-10-CM

## 2025-04-14 DIAGNOSIS — R09.A2 GLOBUS SENSATION: ICD-10-CM

## 2025-04-14 PROCEDURE — 1159F MED LIST DOCD IN RCRD: CPT | Performed by: OTOLARYNGOLOGY

## 2025-04-14 PROCEDURE — 99214 OFFICE O/P EST MOD 30 MIN: CPT | Performed by: HOSPITALIST

## 2025-04-14 PROCEDURE — 99202 OFFICE O/P NEW SF 15 MIN: CPT | Performed by: OTOLARYNGOLOGY

## 2025-04-14 PROCEDURE — 1126F AMNT PAIN NOTED NONE PRSNT: CPT | Performed by: OTOLARYNGOLOGY

## 2025-04-14 PROCEDURE — 31575 DIAGNOSTIC LARYNGOSCOPY: CPT | Performed by: OTOLARYNGOLOGY

## 2025-04-14 PROCEDURE — 1159F MED LIST DOCD IN RCRD: CPT | Performed by: HOSPITALIST

## 2025-04-14 PROCEDURE — 1123F ACP DISCUSS/DSCN MKR DOCD: CPT | Performed by: HOSPITALIST

## 2025-04-14 PROCEDURE — G2211 COMPLEX E/M VISIT ADD ON: HCPCS | Performed by: HOSPITALIST

## 2025-04-14 PROCEDURE — 1123F ACP DISCUSS/DSCN MKR DOCD: CPT | Performed by: OTOLARYNGOLOGY

## 2025-04-14 RX ORDER — DICYCLOMINE HCL 20 MG
20 TABLET ORAL
Qty: 90 TABLET | Refills: 5 | Status: SHIPPED | OUTPATIENT
Start: 2025-04-14 | End: 2025-08-27

## 2025-04-14 RX ORDER — TRIAZOLAM 0.25 MG
0.25 TABLET ORAL NIGHTLY PRN
COMMUNITY
End: 2025-04-17

## 2025-04-14 ASSESSMENT — ENCOUNTER SYMPTOMS
FACIAL SWELLING: 0
SHORTNESS OF BREATH: 0
SORE THROAT: 0
VOICE CHANGE: 1
ABDOMINAL PAIN: 0
EYE PAIN: 0
VOMITING: 0
DIARRHEA: 0
EYE ITCHING: 0
DIARRHEA: 0
BLOOD IN STOOL: 0
CHOKING: 0
TROUBLE SWALLOWING: 0
EYE REDNESS: 0
SINUS PRESSURE: 0
VOICE CHANGE: 1
NAUSEA: 0
SINUS PAIN: 0
CONSTIPATION: 0
COUGH: 0
NAUSEA: 0
SINUS PAIN: 0
BACK PAIN: 0
ABDOMINAL DISTENTION: 0
COUGH: 0
WHEEZING: 0
CHEST TIGHTNESS: 0
RHINORRHEA: 0
SORE THROAT: 0
TROUBLE SWALLOWING: 1
SHORTNESS OF BREATH: 0
SINUS PRESSURE: 0

## 2025-04-14 NOTE — PROGRESS NOTES
[facility]  Follow-up visit   2025    Terry Roy (:  1950) is a 75 y.o. male, here for follow-up:    Chief Complaint   Patient presents with    Other     GI concerns x Cramping , pains in right side , loose bowels , hoarse voice         HPI    Terry Roy is a 75 y.o. male with past medical history of COPD, CAD s/p CABG, hyperlipidemia, GERD, migraines, insomnia.     Hoarseness  The patient has chronic reflux disease.  His last EGD was in .  Hoarseness is a chronic recurrent problem.  He is using chronic daily pantoprazole therapy.  He has had some mild globus sensation.  He has lost approximately 5 pounds.  I have sent him to ENT for further evaluation and direct laryngeal visualization.      Chronic Problems:  CAD/CABG/lipids  This is a chronic problem.  The disease course is stable.  Reviewed previous records and testing including myoview 22 and echo 7/10/2024.  Compliant with crestor 20 mg qd, toprol 25 mg qd, asa 81 mg qd.  Denies chest pain, palpitations, or leg swelling. He follows with Gaurang Mo MD.        Migraine with aura, not intractable  Patient feels this is diet related and precipitated by caffeine or chocolate intake. He has less than 1 migraine monthly. Sampled Nurtec in the past.  This did not seem to help much.  Continuing current lifestyle modifications recommended.       Lower abdominal pain  Has seen Dr. White.  Patient has had an EGD and colonoscopy by Dr. Sam White 10/5/2022 (colonoscopy) and 2022 (EGD).  No acute findings meriting change in the plan.  His symptoms are intermittent.        Gastroesophageal reflux disease without esophagitis  Patient's upper endoscopy of 2022.  His symptoms are well controlled with daily pantoprazole and occasional use of sucralfate.  Lifestyle modifications recommended cont F/U with GI meds and diet .       Primary insomnia  Patient is on Halcion 0.25 mg nightly.  Typically using

## 2025-04-14 NOTE — PROGRESS NOTES
posterior cervical adenopathy.   Skin:     General: Skin is warm and dry.      Findings: No lesion.   Neurological:      Mental Status: He is alert and oriented to person, place, and time.   Psychiatric:         Mood and Affect: Mood is not anxious or depressed.         Due to the patients chronic benign and/or malignant laryngeal disease and/or history of laryngeal neoplasm for surveillance a flexible laryngoscopy will be performed to complete a significant physical examination of the patient which cannot be performed by indirect mirror (ie. Gag or incomplete visualization, see physical exam notes). Failure to provide this procedure may lead to late detection of significant chronic benign disease, acute exacerbation, resolution or failure of early diagnosis of recurrent cancer. The procedure report is present in the body of the chart.     Flexible Laryngoscopy    Pre op: hoarse voice.   Post op: muscle tension dysphonia  Procedure : FlexibleLaryngoscopy  Surgeon: Bravo Love  Anesthesia: Afrin with 2% lidocaine  Estimated Blood Loss: None    Procedure:   Flexible Laryngoscopy     After obtaining consent, the patient was placed in the examination chair in the upright position.  Decongestant and topical anesthetic was sprayed in the After allowing adequate time for hemostatic effect, the flexible 4 mm laryngoscope was passed via the     Nasal Septum: normal;   Nasal Findings: normal;   Nasopharynx: normal   Eustachian Tube: normal   Oropharynx: normal   Base of Tongue: normal   Epiglottis: normal   True Vocal Cord normal   False Vocal Cord: normal   True Vocal Cord Movement: muscle tension dysphonia   Hypopharynx Mucosa: normal  Arytenoids: normal     * Patient tolerated the procedure well with no complications   * Patient was instructed not to eat for 30 minutes following procedure.   * Patient was instructed that they may notice minor bleeding.    Attestation:   I was present for the entire viewing, including

## 2025-04-16 ENCOUNTER — PATIENT MESSAGE (OUTPATIENT)
Dept: INTERNAL MEDICINE CLINIC | Age: 75
End: 2025-04-16

## 2025-04-16 DIAGNOSIS — R49.9 HOARSENESS OR CHANGING VOICE: Primary | ICD-10-CM

## 2025-04-16 NOTE — TELEPHONE ENCOUNTER
Ok to place referral and I will sign.  The provider is external ([facility])

## 2025-04-17 DIAGNOSIS — F51.01 PRIMARY INSOMNIA: Primary | ICD-10-CM

## 2025-04-17 RX ORDER — CLINDAMYCIN PHOSPHATE 10 MG/G
GEL TOPICAL
Qty: 30 G | Refills: 2 | Status: SHIPPED | OUTPATIENT
Start: 2025-04-17

## 2025-04-17 RX ORDER — TRIAZOLAM 0.25 MG
TABLET ORAL
Qty: 30 TABLET | Refills: 0 | Status: SHIPPED | OUTPATIENT
Start: 2025-04-17 | End: 2025-05-17

## 2025-04-22 ENCOUNTER — TELEPHONE (OUTPATIENT)
Dept: INTERNAL MEDICINE CLINIC | Age: 75
End: 2025-04-22

## 2025-05-14 ENCOUNTER — PATIENT MESSAGE (OUTPATIENT)
Dept: INTERNAL MEDICINE CLINIC | Age: 75
End: 2025-05-14

## 2025-05-14 NOTE — TELEPHONE ENCOUNTER
I would like him to see his cardiologist on 7/17/2025.  If it is okay with cardiology, I would be fine with prescribing the medication.  The medication does have potential cardiac side effects, some of which can be fatal.

## 2025-05-28 ENCOUNTER — PATIENT MESSAGE (OUTPATIENT)
Dept: INTERNAL MEDICINE CLINIC | Age: 75
End: 2025-05-28

## 2025-05-28 DIAGNOSIS — F51.01 PRIMARY INSOMNIA: ICD-10-CM

## 2025-05-28 RX ORDER — TRIAZOLAM 0.25 MG
TABLET ORAL
Qty: 30 TABLET | Refills: 5 | Status: SHIPPED | OUTPATIENT
Start: 2025-05-28 | End: 2025-06-27

## 2025-05-28 NOTE — TELEPHONE ENCOUNTER
Last appointment: 4/14/2025  Next appointment: 1/26/2026        Last refill: (4/17/2025) by Jacki Singleton MD

## 2025-06-02 ENCOUNTER — TELEPHONE (OUTPATIENT)
Dept: INTERNAL MEDICINE CLINIC | Age: 75
End: 2025-06-02

## 2025-06-02 NOTE — TELEPHONE ENCOUNTER
Humaira from [facility] states that   Dr. Mo says that pt can take Vigara and his imdur has been discontinued. Document is in his chart patient message dated 5-22-25    [phone] (Humaira)  [facility]

## 2025-06-03 NOTE — TELEPHONE ENCOUNTER
I don't see a note mentioning the viagra from Cardiology.  If you see one can you print it off and show me?

## 2025-06-10 ENCOUNTER — OFFICE VISIT (OUTPATIENT)
Dept: FAMILY MEDICINE CLINIC | Age: 75
End: 2025-06-10
Payer: MEDICARE

## 2025-06-10 ENCOUNTER — TELEPHONE (OUTPATIENT)
Dept: INTERNAL MEDICINE CLINIC | Age: 75
End: 2025-06-10

## 2025-06-10 VITALS
BODY MASS INDEX: 23.46 KG/M2 | OXYGEN SATURATION: 99 % | WEIGHT: 177 LBS | DIASTOLIC BLOOD PRESSURE: 80 MMHG | HEART RATE: 55 BPM | HEIGHT: 73 IN | SYSTOLIC BLOOD PRESSURE: 136 MMHG

## 2025-06-10 DIAGNOSIS — K21.9 GASTROESOPHAGEAL REFLUX DISEASE WITHOUT ESOPHAGITIS: Primary | ICD-10-CM

## 2025-06-10 PROCEDURE — 99214 OFFICE O/P EST MOD 30 MIN: CPT

## 2025-06-10 PROCEDURE — 1160F RVW MEDS BY RX/DR IN RCRD: CPT

## 2025-06-10 PROCEDURE — 1123F ACP DISCUSS/DSCN MKR DOCD: CPT

## 2025-06-10 PROCEDURE — 1159F MED LIST DOCD IN RCRD: CPT

## 2025-06-10 RX ORDER — SILDENAFIL 50 MG/1
50 TABLET, FILM COATED ORAL PRN
Qty: 10 TABLET | Refills: 3 | Status: SHIPPED | OUTPATIENT
Start: 2025-06-10

## 2025-06-10 RX ORDER — BUDESONIDE, GLYCOPYRROLATE, AND FORMOTEROL FUMARATE 160; 9; 4.8 UG/1; UG/1; UG/1
AEROSOL, METERED RESPIRATORY (INHALATION)
COMMUNITY

## 2025-06-10 ASSESSMENT — ENCOUNTER SYMPTOMS
ABDOMINAL DISTENTION: 1
NAUSEA: 0
RECTAL PAIN: 0
SORE THROAT: 0
VOMITING: 0
CHEST TIGHTNESS: 0
COUGH: 0
CONSTIPATION: 0
ABDOMINAL PAIN: 0
WHEEZING: 0
SHORTNESS OF BREATH: 1
BACK PAIN: 0
DIARRHEA: 0
COLOR CHANGE: 0
TROUBLE SWALLOWING: 0
BLOOD IN STOOL: 0
ANAL BLEEDING: 0

## 2025-06-10 NOTE — PROGRESS NOTES
Terry Roy (:  1950) is a 75 y.o. male,Established patient, here for evaluation of the following chief complaint(s):  Shortness of Breath (Dry mouth and will sometimes lose voice- does have stomach issues where stomach was gurgling. No diarrhea, belching will make him feel slightly better- did just have a scope done and he has had these symptoms before)      Assessment & Plan  Gastroesophageal reflux disease without esophagitis   Chronic, worsening (exacerbation), uncontrolled w/ current treatment plan  - Pantoprazole 40 mg daily, taking Bentyl twice daily, occasional simethicone  - Encouraged increase Bentyl to 3 times daily  - Discussed amitriptyline for IBS, holding at this time due to GI appointment Thursday  - Encouraged food diary  - Low suspicion for pulmonary or cardiac origin  - Discussed CXR, holding off at this time  -Can continue inhalers for COPD  -Follow with GI, follow-up with PCP if no improvement         Return if symptoms worsen or fail to improve.    Subjective       HPI  - some shallow breathing ongoing  - sometimes feels SoB at home suddenly  - began 10 days ago  - had some grumbling stomach at the beginning followed by SoB  - usually SoB and stomach issues are separate  - dry mouth, hoarse voice at times  - inhalers not helping w/ SoB  - notes hx GERD and COPD hx?  - GI Cindy  - notes was in [place]  - generally stays in good shape  - hx 2x bypass open heart sx  - was fine going to be last night, woke up shallow breathing SoB  - went for a walk, felt better during and afterwards  - does keep a good pace  - feels pretty gassy on a regular basis  - does deep breathng exercises at home regularly   - went to urgent care, using breztri BID, albuterol PRN, not helping  - pressure w/ trying to burp  - eating sometimes can cause a problem, chewing and swallowing alone before food hits stomach  - tries to stay w/ chicken and plant based foods  - takes pantoprazole QD, dicyclomine

## 2025-06-10 NOTE — TELEPHONE ENCOUNTER
Pt called req med refill for viagra. Pt states the cardiologist office called and called for the ok for pt to have this script.  Please advise  [phone]

## 2025-06-10 NOTE — TELEPHONE ENCOUNTER
Please let the patient know [facility]  Dispense #10, 3 refills.

## 2025-06-10 NOTE — ASSESSMENT & PLAN NOTE
Chronic, worsening (exacerbation), uncontrolled w/ current treatment plan  - Pantoprazole 40 mg daily, taking Bentyl twice daily, occasional simethicone  - Encouraged increase Bentyl to 3 times daily  - Discussed amitriptyline for IBS, holding at this time due to GI appointment Thursday  - Encouraged food diary  - Low suspicion for pulmonary or cardiac origin  - Discussed CXR, holding off at this time  -Can continue inhalers for COPD  -Follow with GI, follow-up with PCP if no improvement

## 2025-06-11 ENCOUNTER — TELEPHONE (OUTPATIENT)
Dept: FAMILY MEDICINE CLINIC | Age: 75
End: 2025-06-11

## 2025-06-11 ENCOUNTER — HOSPITAL ENCOUNTER (OUTPATIENT)
Dept: GENERAL RADIOLOGY | Age: 75
Discharge: HOME | End: 2025-06-11
Payer: MEDICARE

## 2025-06-11 DIAGNOSIS — R06.02 SHORTNESS OF BREATH: ICD-10-CM

## 2025-06-11 DIAGNOSIS — R06.02 SHORTNESS OF BREATH: Primary | ICD-10-CM

## 2025-06-11 PROCEDURE — 71046 X-RAY EXAM CHEST 2 VIEWS: CPT

## 2025-06-11 NOTE — TELEPHONE ENCOUNTER
Pt saw Romulo in office and denied getting a chest x-ray and pt stated he now does think he needs a Chest x-ray. Please advise once order is placed.   [phone] (home)

## 2025-06-12 ENCOUNTER — RESULTS FOLLOW-UP (OUTPATIENT)
Dept: FAMILY MEDICINE CLINIC | Age: 75
End: 2025-06-12

## 2025-06-17 DIAGNOSIS — J43.2 CENTRILOBULAR EMPHYSEMA (HCC): ICD-10-CM

## 2025-06-17 DIAGNOSIS — R06.00 DYSPNEA, UNSPECIFIED TYPE: ICD-10-CM

## 2025-06-17 RX ORDER — ALBUTEROL SULFATE 90 UG/1
INHALANT RESPIRATORY (INHALATION)
Qty: 18 G | Refills: 1 | Status: SHIPPED | OUTPATIENT
Start: 2025-06-17

## 2025-06-17 NOTE — TELEPHONE ENCOUNTER
Last appointment: 4/14/2025  Next appointment: 1/26/2026        Last refill: 1/16/2024) by Jacki Singleton MD

## 2025-06-20 ENCOUNTER — OFFICE VISIT (OUTPATIENT)
Dept: FAMILY MEDICINE CLINIC | Age: 75
End: 2025-06-20
Payer: MEDICARE

## 2025-06-20 VITALS
OXYGEN SATURATION: 98 % | TEMPERATURE: 97 F | HEIGHT: 73 IN | SYSTOLIC BLOOD PRESSURE: 120 MMHG | WEIGHT: 178.4 LBS | HEART RATE: 53 BPM | BODY MASS INDEX: 23.64 KG/M2 | DIASTOLIC BLOOD PRESSURE: 80 MMHG

## 2025-06-20 DIAGNOSIS — R06.02 SHORTNESS OF BREATH: Primary | ICD-10-CM

## 2025-06-20 DIAGNOSIS — I25.10 CORONARY ARTERY DISEASE INVOLVING NATIVE CORONARY ARTERY OF NATIVE HEART WITHOUT ANGINA PECTORIS: ICD-10-CM

## 2025-06-20 PROCEDURE — 1123F ACP DISCUSS/DSCN MKR DOCD: CPT

## 2025-06-20 PROCEDURE — 99213 OFFICE O/P EST LOW 20 MIN: CPT

## 2025-06-20 PROCEDURE — 1160F RVW MEDS BY RX/DR IN RCRD: CPT

## 2025-06-20 PROCEDURE — 1159F MED LIST DOCD IN RCRD: CPT

## 2025-06-20 ASSESSMENT — ENCOUNTER SYMPTOMS
CONSTIPATION: 0
ABDOMINAL DISTENTION: 1
BACK PAIN: 0
SHORTNESS OF BREATH: 1
CHEST TIGHTNESS: 0
VOMITING: 0
COUGH: 0
ABDOMINAL PAIN: 0
ANAL BLEEDING: 0
SORE THROAT: 0
WHEEZING: 0
RECTAL PAIN: 0
DIARRHEA: 0
BLOOD IN STOOL: 0
COLOR CHANGE: 0
NAUSEA: 0
TROUBLE SWALLOWING: 0

## 2025-06-20 NOTE — PROGRESS NOTES
Terry Roy (:  1950) is a 75 y.o. male,Established patient, here for evaluation of the following chief complaint(s):  Other (Patient c/o of breathing problems, not feeling well, It's been 3 weeks)      Assessment & Plan  Shortness of breath   Acute condition, recurrent, ongoing from last week  - Clear CXR  -Improvement with Protonix, and Protonix increased to twice daily  -Continue plan to follow-up with GI in 6 weeks  -Given this is improved but ongoing, will begin cardiac evaluation  -Keep plans to see cardiology in July  - CT calcium screening ordered  - Follow-up with results  Orders:    CT CARDIAC CALCIUM SCORING; Future    Coronary artery disease involving native coronary artery of native heart without angina pectoris   Chronic, not at goal (unstable), symptoms above potentially due to CAD  -Keep plans to see cardiology in July  - CT calcium screening ordered  - Follow-up with results  Orders:    CT CARDIAC CALCIUM SCORING; Future      Return if symptoms worsen or fail to improve.    Subjective       HPI  - since last week, stomach is better, growling less  - voice less hoarse today, still goes out  - told to double protonix, f/u in 6 weeks  - shallow breathing, feels like before his bypass  - uses inhaler, does not help  - burping can improve his breathing  - more fatigued, more at rest  - tries to walk, somedays good, somedays bad  - once he is up and moving, he is fine  - seeing cardio next month  - occurs occasionally at rest when watching TV  - feels some pressure in his chest occasionally, does not last long  - less active since retiring  - taking naps in the afternoon   - CXR WNL       Review of Systems   Constitutional:  Positive for fatigue. Negative for activity change, appetite change, chills, diaphoresis, fever and unexpected weight change.   HENT:  Negative for drooling, mouth sores, sore throat and trouble swallowing.    Respiratory:  Positive for shortness of breath. Negative for

## 2025-06-20 NOTE — ASSESSMENT & PLAN NOTE
Chronic, not at goal (unstable), symptoms above potentially due to CAD  -Keep plans to see cardiology in July  - CT calcium screening ordered  - Follow-up with results  Orders:    CT CARDIAC CALCIUM SCORING; Future

## 2025-06-30 ENCOUNTER — TELEPHONE (OUTPATIENT)
Dept: FAMILY MEDICINE CLINIC | Age: 75
End: 2025-06-30

## 2025-06-30 NOTE — TELEPHONE ENCOUNTER
[phone] ext. [phone]    CT Cardiac was denied.  Today is last day to do peer to peer.     Providing information + phone number to call.     Case #[phone]    Peer to peer #[phone]

## 2025-06-30 NOTE — TELEPHONE ENCOUNTER
CT calcium not covered by insurance, recommend stress test instead, will order if okay with patient

## 2025-07-01 NOTE — TELEPHONE ENCOUNTER
Pt called back and would like more information about what a stress test is and how it works. He does not know if this would be cheaper and with his medical history he just wants more information from the provider and to know if there is anything else he would be able to do besides these two tests.   [phone] (home)

## 2025-07-07 ENCOUNTER — RESULTS FOLLOW-UP (OUTPATIENT)
Dept: FAMILY MEDICINE CLINIC | Age: 75
End: 2025-07-07

## 2025-07-07 ENCOUNTER — HOSPITAL ENCOUNTER (OUTPATIENT)
Dept: CT IMAGING | Age: 75
Discharge: HOME | End: 2025-07-07
Payer: MEDICARE

## 2025-07-07 DIAGNOSIS — I25.10 CORONARY ARTERY DISEASE INVOLVING NATIVE CORONARY ARTERY OF NATIVE HEART WITHOUT ANGINA PECTORIS: ICD-10-CM

## 2025-07-07 DIAGNOSIS — R06.02 SHORTNESS OF BREATH: ICD-10-CM

## 2025-07-07 PROCEDURE — 75571 CT HRT W/O DYE W/CA TEST: CPT

## 2025-07-10 ENCOUNTER — APPOINTMENT (OUTPATIENT)
Dept: GENERAL RADIOLOGY | Age: 75
End: 2025-07-10
Payer: MEDICARE

## 2025-07-10 ENCOUNTER — HOSPITAL ENCOUNTER (OUTPATIENT)
Age: 75
Setting detail: OBSERVATION
Discharge: HOME | End: 2025-07-12
Attending: EMERGENCY MEDICINE | Admitting: INTERNAL MEDICINE
Payer: MEDICARE

## 2025-07-10 DIAGNOSIS — R07.9 CHEST PAIN, UNSPECIFIED TYPE: Primary | ICD-10-CM

## 2025-07-10 DIAGNOSIS — K21.9 GASTROESOPHAGEAL REFLUX DISEASE WITHOUT ESOPHAGITIS: ICD-10-CM

## 2025-07-10 PROBLEM — R07.89 ATYPICAL CHEST PAIN: Status: ACTIVE | Noted: 2025-07-10

## 2025-07-10 LAB
ANION GAP SERPL CALCULATED.3IONS-SCNC: 10 MMOL/L (ref 3–16)
BASOPHILS # BLD: 0 K/UL (ref 0–0.2)
BASOPHILS NFR BLD: 0.3 %
BUN SERPL-MCNC: 16 MG/DL (ref 7–20)
CALCIUM SERPL-MCNC: 9.5 MG/DL (ref 8.3–10.6)
CHLORIDE SERPL-SCNC: 104 MMOL/L (ref 99–110)
CO2 SERPL-SCNC: 28 MMOL/L (ref 21–32)
CREAT SERPL-MCNC: 0.8 MG/DL (ref 0.8–1.3)
DEPRECATED RDW RBC AUTO: 12.8 % (ref 12.4–15.4)
EOSINOPHIL # BLD: 0.2 K/UL (ref 0–0.6)
EOSINOPHIL NFR BLD: 4.2 %
GFR SERPLBLD CREATININE-BSD FMLA CKD-EPI: >90 ML/MIN/{1.73_M2}
GLUCOSE SERPL-MCNC: 126 MG/DL (ref 70–99)
HCT VFR BLD AUTO: 42.7 % (ref 40.5–52.5)
HGB BLD-MCNC: 14.9 G/DL (ref 13.5–17.5)
LYMPHOCYTES # BLD: 1.4 K/UL (ref 1–5.1)
LYMPHOCYTES NFR BLD: 29 %
MCH RBC QN AUTO: 32.3 PG (ref 26–34)
MCHC RBC AUTO-ENTMCNC: 35 G/DL (ref 31–36)
MCV RBC AUTO: 92.2 FL (ref 80–100)
MONOCYTES # BLD: 0.5 K/UL (ref 0–1.3)
MONOCYTES NFR BLD: 9.9 %
NEUTROPHILS # BLD: 2.8 K/UL (ref 1.7–7.7)
NEUTROPHILS NFR BLD: 56.6 %
NT-PROBNP SERPL-MCNC: 106 PG/ML (ref 0–449)
PLATELET # BLD AUTO: 211 K/UL (ref 135–450)
PMV BLD AUTO: 8.7 FL (ref 5–10.5)
POTASSIUM SERPL-SCNC: 3.7 MMOL/L (ref 3.5–5.1)
RBC # BLD AUTO: 4.63 M/UL (ref 4.2–5.9)
SODIUM SERPL-SCNC: 142 MMOL/L (ref 136–145)
TROPONIN, HIGH SENSITIVITY: 7 NG/L (ref 0–22)
TROPONIN, HIGH SENSITIVITY: 7 NG/L (ref 0–22)
WBC # BLD AUTO: 5 K/UL (ref 4–11)

## 2025-07-10 PROCEDURE — 80048 BASIC METABOLIC PNL TOTAL CA: CPT

## 2025-07-10 PROCEDURE — 36415 COLL VENOUS BLD VENIPUNCTURE: CPT

## 2025-07-10 PROCEDURE — 99285 EMERGENCY DEPT VISIT HI MDM: CPT

## 2025-07-10 PROCEDURE — 93005 ELECTROCARDIOGRAM TRACING: CPT

## 2025-07-10 PROCEDURE — 84484 ASSAY OF TROPONIN QUANT: CPT

## 2025-07-10 PROCEDURE — 6370000000 HC RX 637 (ALT 250 FOR IP)

## 2025-07-10 PROCEDURE — 71046 X-RAY EXAM CHEST 2 VIEWS: CPT

## 2025-07-10 PROCEDURE — 85025 COMPLETE CBC W/AUTO DIFF WBC: CPT

## 2025-07-10 PROCEDURE — 1200000000 HC SEMI PRIVATE

## 2025-07-10 PROCEDURE — 83735 ASSAY OF MAGNESIUM: CPT

## 2025-07-10 PROCEDURE — 83880 ASSAY OF NATRIURETIC PEPTIDE: CPT

## 2025-07-10 RX ORDER — TRIAZOLAM 0.25 MG
0.25 TABLET ORAL NIGHTLY PRN
COMMUNITY

## 2025-07-10 RX ORDER — SIMETHICONE 80 MG
80 TABLET,CHEWABLE ORAL 2 TIMES DAILY PRN
COMMUNITY

## 2025-07-10 RX ORDER — FLUTICASONE PROPIONATE 50 MCG
1 SPRAY, SUSPENSION (ML) NASAL DAILY
COMMUNITY

## 2025-07-10 RX ORDER — ACETAMINOPHEN 650 MG/1
650 SUPPOSITORY RECTAL EVERY 6 HOURS PRN
Status: DISCONTINUED | OUTPATIENT
Start: 2025-07-10 | End: 2025-07-11 | Stop reason: DRUGHIGH

## 2025-07-10 RX ORDER — ACETAMINOPHEN 325 MG/1
650 TABLET ORAL EVERY 6 HOURS PRN
Status: DISCONTINUED | OUTPATIENT
Start: 2025-07-10 | End: 2025-07-11 | Stop reason: DRUGHIGH

## 2025-07-10 RX ORDER — ASPIRIN 325 MG
325 TABLET ORAL ONCE
Status: COMPLETED | OUTPATIENT
Start: 2025-07-10 | End: 2025-07-10

## 2025-07-10 RX ADMIN — ASPIRIN 325 MG: 325 TABLET ORAL at 22:45

## 2025-07-10 RX ADMIN — ACETAMINOPHEN 650 MG: 325 TABLET ORAL at 23:58

## 2025-07-10 ASSESSMENT — PAIN SCALES - GENERAL
PAINLEVEL_OUTOF10: 3
PAINLEVEL_OUTOF10: 2

## 2025-07-10 ASSESSMENT — PAIN - FUNCTIONAL ASSESSMENT
PAIN_FUNCTIONAL_ASSESSMENT: 0-10
PAIN_FUNCTIONAL_ASSESSMENT: 0-10

## 2025-07-10 ASSESSMENT — PAIN DESCRIPTION - LOCATION
LOCATION: CHEST
LOCATION: CHEST

## 2025-07-10 ASSESSMENT — PAIN DESCRIPTION - DESCRIPTORS
DESCRIPTORS: PRESSURE
DESCRIPTORS: PRESSURE

## 2025-07-10 ASSESSMENT — PAIN DESCRIPTION - ORIENTATION: ORIENTATION: MID

## 2025-07-11 PROBLEM — R07.9 CHEST PAIN: Status: ACTIVE | Noted: 2025-07-10

## 2025-07-11 PROBLEM — I20.0 UNSTABLE ANGINA (HCC): Status: ACTIVE | Noted: 2025-07-11

## 2025-07-11 LAB
ALBUMIN SERPL-MCNC: 4.1 G/DL (ref 3.4–5)
ANION GAP SERPL CALCULATED.3IONS-SCNC: 11 MMOL/L (ref 3–16)
BASOPHILS # BLD: 0 K/UL (ref 0–0.2)
BASOPHILS NFR BLD: 0.2 %
BUN SERPL-MCNC: 14 MG/DL (ref 7–20)
CALCIUM SERPL-MCNC: 9.3 MG/DL (ref 8.3–10.6)
CHLORIDE SERPL-SCNC: 106 MMOL/L (ref 99–110)
CO2 SERPL-SCNC: 27 MMOL/L (ref 21–32)
CREAT SERPL-MCNC: 0.7 MG/DL (ref 0.8–1.3)
DEPRECATED RDW RBC AUTO: 12.7 % (ref 12.4–15.4)
ECHO BSA: 2.04 M2
EKG ATRIAL RATE: 55 BPM
EKG DIAGNOSIS: NORMAL
EKG P AXIS: 34 DEGREES
EKG P-R INTERVAL: 182 MS
EKG Q-T INTERVAL: 438 MS
EKG QRS DURATION: 94 MS
EKG QTC CALCULATION (BAZETT): 419 MS
EKG R AXIS: 7 DEGREES
EKG T AXIS: 24 DEGREES
EKG VENTRICULAR RATE: 55 BPM
EOSINOPHIL # BLD: 0.1 K/UL (ref 0–0.6)
EOSINOPHIL NFR BLD: 1.3 %
GFR SERPLBLD CREATININE-BSD FMLA CKD-EPI: >90 ML/MIN/{1.73_M2}
GLUCOSE SERPL-MCNC: 101 MG/DL (ref 70–99)
HCT VFR BLD AUTO: 44.1 % (ref 40.5–52.5)
HGB BLD-MCNC: 15.1 G/DL (ref 13.5–17.5)
LYMPHOCYTES # BLD: 1.1 K/UL (ref 1–5.1)
LYMPHOCYTES NFR BLD: 16.4 %
MAGNESIUM SERPL-MCNC: 2.04 MG/DL (ref 1.8–2.4)
MAGNESIUM SERPL-MCNC: 2.25 MG/DL (ref 1.8–2.4)
MCH RBC QN AUTO: 31.7 PG (ref 26–34)
MCHC RBC AUTO-ENTMCNC: 34.2 G/DL (ref 31–36)
MCV RBC AUTO: 92.8 FL (ref 80–100)
MONOCYTES # BLD: 0.4 K/UL (ref 0–1.3)
MONOCYTES NFR BLD: 6.2 %
NEUTROPHILS # BLD: 5.2 K/UL (ref 1.7–7.7)
NEUTROPHILS NFR BLD: 75.9 %
PHOSPHATE SERPL-MCNC: 3.4 MG/DL (ref 2.5–4.9)
PLATELET # BLD AUTO: 214 K/UL (ref 135–450)
PMV BLD AUTO: 8.7 FL (ref 5–10.5)
POTASSIUM SERPL-SCNC: 4.1 MMOL/L (ref 3.5–5.1)
RBC # BLD AUTO: 4.75 M/UL (ref 4.2–5.9)
SODIUM SERPL-SCNC: 144 MMOL/L (ref 136–145)
WBC # BLD AUTO: 6.9 K/UL (ref 4–11)

## 2025-07-11 PROCEDURE — G0378 HOSPITAL OBSERVATION PER HR: HCPCS

## 2025-07-11 PROCEDURE — 2709999900 HC NON-CHARGEABLE SUPPLY: Performed by: INTERNAL MEDICINE

## 2025-07-11 PROCEDURE — 2500000003 HC RX 250 WO HCPCS: Performed by: INTERNAL MEDICINE

## 2025-07-11 PROCEDURE — C1760 CLOSURE DEV, VASC: HCPCS | Performed by: INTERNAL MEDICINE

## 2025-07-11 PROCEDURE — 36415 COLL VENOUS BLD VENIPUNCTURE: CPT

## 2025-07-11 PROCEDURE — 2500000003 HC RX 250 WO HCPCS

## 2025-07-11 PROCEDURE — 99153 MOD SED SAME PHYS/QHP EA: CPT | Performed by: INTERNAL MEDICINE

## 2025-07-11 PROCEDURE — 6370000000 HC RX 637 (ALT 250 FOR IP)

## 2025-07-11 PROCEDURE — 99152 MOD SED SAME PHYS/QHP 5/>YRS: CPT | Performed by: INTERNAL MEDICINE

## 2025-07-11 PROCEDURE — 6370000000 HC RX 637 (ALT 250 FOR IP): Performed by: INTERNAL MEDICINE

## 2025-07-11 PROCEDURE — 83735 ASSAY OF MAGNESIUM: CPT

## 2025-07-11 PROCEDURE — 93459 L HRT ART/GRFT ANGIO: CPT | Performed by: INTERNAL MEDICINE

## 2025-07-11 PROCEDURE — C1894 INTRO/SHEATH, NON-LASER: HCPCS | Performed by: INTERNAL MEDICINE

## 2025-07-11 PROCEDURE — 85025 COMPLETE CBC W/AUTO DIFF WBC: CPT

## 2025-07-11 PROCEDURE — 7100000011 HC PHASE II RECOVERY - ADDTL 15 MIN: Performed by: INTERNAL MEDICINE

## 2025-07-11 PROCEDURE — 99222 1ST HOSP IP/OBS MODERATE 55: CPT | Performed by: HOSPITALIST

## 2025-07-11 PROCEDURE — 6360000004 HC RX CONTRAST MEDICATION: Performed by: INTERNAL MEDICINE

## 2025-07-11 PROCEDURE — 7100000010 HC PHASE II RECOVERY - FIRST 15 MIN: Performed by: INTERNAL MEDICINE

## 2025-07-11 PROCEDURE — C1769 GUIDE WIRE: HCPCS | Performed by: INTERNAL MEDICINE

## 2025-07-11 PROCEDURE — 80069 RENAL FUNCTION PANEL: CPT

## 2025-07-11 PROCEDURE — 2580000003 HC RX 258: Performed by: INTERNAL MEDICINE

## 2025-07-11 PROCEDURE — 6360000002 HC RX W HCPCS: Performed by: INTERNAL MEDICINE

## 2025-07-11 DEVICE — ANGIO-SEAL VIP VASCULAR CLOSURE DEVICE
Type: IMPLANTABLE DEVICE | Status: FUNCTIONAL
Brand: ANGIO-SEAL

## 2025-07-11 RX ORDER — DEXTROSE MONOHYDRATE 100 MG/ML
INJECTION, SOLUTION INTRAVENOUS CONTINUOUS PRN
Status: DISCONTINUED | OUTPATIENT
Start: 2025-07-11 | End: 2025-07-12 | Stop reason: HOSPADM

## 2025-07-11 RX ORDER — ASPIRIN 81 MG/1
81 TABLET, CHEWABLE ORAL DAILY
Status: DISCONTINUED | OUTPATIENT
Start: 2025-07-11 | End: 2025-07-12 | Stop reason: HOSPADM

## 2025-07-11 RX ORDER — SODIUM CHLORIDE 9 MG/ML
INJECTION, SOLUTION INTRAVENOUS PRN
Status: DISCONTINUED | OUTPATIENT
Start: 2025-07-11 | End: 2025-07-12 | Stop reason: HOSPADM

## 2025-07-11 RX ORDER — HEPARIN SODIUM 1000 [USP'U]/ML
INJECTION, SOLUTION INTRAVENOUS; SUBCUTANEOUS PRN
Status: DISCONTINUED | OUTPATIENT
Start: 2025-07-11 | End: 2025-07-11 | Stop reason: HOSPADM

## 2025-07-11 RX ORDER — ALBUTEROL SULFATE 0.83 MG/ML
2.5 SOLUTION RESPIRATORY (INHALATION) EVERY 6 HOURS PRN
Status: DISCONTINUED | OUTPATIENT
Start: 2025-07-11 | End: 2025-07-12 | Stop reason: HOSPADM

## 2025-07-11 RX ORDER — ASPIRIN 325 MG
325 TABLET ORAL ONCE
Status: COMPLETED | OUTPATIENT
Start: 2025-07-11 | End: 2025-07-11

## 2025-07-11 RX ORDER — OXYCODONE AND ACETAMINOPHEN 5; 325 MG/1; MG/1
1 TABLET ORAL EVERY 4 HOURS PRN
Status: DISCONTINUED | OUTPATIENT
Start: 2025-07-11 | End: 2025-07-12 | Stop reason: HOSPADM

## 2025-07-11 RX ORDER — ACETAMINOPHEN 325 MG/1
650 TABLET ORAL EVERY 4 HOURS PRN
Status: DISCONTINUED | OUTPATIENT
Start: 2025-07-11 | End: 2025-07-12 | Stop reason: HOSPADM

## 2025-07-11 RX ORDER — POLYETHYLENE GLYCOL 3350 17 G/17G
17 POWDER, FOR SOLUTION ORAL DAILY PRN
Status: DISCONTINUED | OUTPATIENT
Start: 2025-07-11 | End: 2025-07-12 | Stop reason: HOSPADM

## 2025-07-11 RX ORDER — ZOLPIDEM TARTRATE 5 MG/1
5 TABLET ORAL NIGHTLY PRN
Status: DISCONTINUED | OUTPATIENT
Start: 2025-07-11 | End: 2025-07-12 | Stop reason: HOSPADM

## 2025-07-11 RX ORDER — SODIUM CHLORIDE 9 MG/ML
INJECTION, SOLUTION INTRAVENOUS CONTINUOUS
Status: DISCONTINUED | OUTPATIENT
Start: 2025-07-11 | End: 2025-07-12

## 2025-07-11 RX ORDER — ONDANSETRON 4 MG/1
4 TABLET, ORALLY DISINTEGRATING ORAL EVERY 8 HOURS PRN
Status: DISCONTINUED | OUTPATIENT
Start: 2025-07-11 | End: 2025-07-12 | Stop reason: HOSPADM

## 2025-07-11 RX ORDER — IOPAMIDOL 755 MG/ML
INJECTION, SOLUTION INTRAVASCULAR PRN
Status: DISCONTINUED | OUTPATIENT
Start: 2025-07-11 | End: 2025-07-11 | Stop reason: HOSPADM

## 2025-07-11 RX ORDER — ONDANSETRON 2 MG/ML
4 INJECTION INTRAMUSCULAR; INTRAVENOUS EVERY 6 HOURS PRN
Status: DISCONTINUED | OUTPATIENT
Start: 2025-07-11 | End: 2025-07-12 | Stop reason: HOSPADM

## 2025-07-11 RX ORDER — MIDAZOLAM HYDROCHLORIDE 1 MG/ML
INJECTION, SOLUTION INTRAMUSCULAR; INTRAVENOUS PRN
Status: DISCONTINUED | OUTPATIENT
Start: 2025-07-11 | End: 2025-07-11 | Stop reason: HOSPADM

## 2025-07-11 RX ORDER — ROSUVASTATIN CALCIUM 20 MG/1
20 TABLET, COATED ORAL NIGHTLY
Status: DISCONTINUED | OUTPATIENT
Start: 2025-07-11 | End: 2025-07-12 | Stop reason: HOSPADM

## 2025-07-11 RX ORDER — PANTOPRAZOLE SODIUM 40 MG/1
40 TABLET, DELAYED RELEASE ORAL 2 TIMES DAILY
Status: DISCONTINUED | OUTPATIENT
Start: 2025-07-11 | End: 2025-07-12 | Stop reason: HOSPADM

## 2025-07-11 RX ORDER — FENTANYL CITRATE 50 UG/ML
INJECTION, SOLUTION INTRAMUSCULAR; INTRAVENOUS PRN
Status: DISCONTINUED | OUTPATIENT
Start: 2025-07-11 | End: 2025-07-11 | Stop reason: HOSPADM

## 2025-07-11 RX ORDER — LIDOCAINE HYDROCHLORIDE 10 MG/ML
INJECTION, SOLUTION EPIDURAL; INFILTRATION; INTRACAUDAL; PERINEURAL PRN
Status: DISCONTINUED | OUTPATIENT
Start: 2025-07-11 | End: 2025-07-11 | Stop reason: HOSPADM

## 2025-07-11 RX ORDER — OXYCODONE AND ACETAMINOPHEN 5; 325 MG/1; MG/1
2 TABLET ORAL EVERY 4 HOURS PRN
Status: DISCONTINUED | OUTPATIENT
Start: 2025-07-11 | End: 2025-07-12 | Stop reason: HOSPADM

## 2025-07-11 RX ORDER — GLUCAGON 1 MG/ML
1 KIT INJECTION PRN
Status: DISCONTINUED | OUTPATIENT
Start: 2025-07-11 | End: 2025-07-12 | Stop reason: HOSPADM

## 2025-07-11 RX ORDER — SODIUM CHLORIDE 0.9 % (FLUSH) 0.9 %
5-40 SYRINGE (ML) INJECTION EVERY 12 HOURS SCHEDULED
Status: DISCONTINUED | OUTPATIENT
Start: 2025-07-11 | End: 2025-07-12 | Stop reason: HOSPADM

## 2025-07-11 RX ORDER — METOPROLOL SUCCINATE 25 MG/1
25 TABLET, EXTENDED RELEASE ORAL DAILY
Status: DISCONTINUED | OUTPATIENT
Start: 2025-07-11 | End: 2025-07-11

## 2025-07-11 RX ORDER — SODIUM CHLORIDE 0.9 % (FLUSH) 0.9 %
5-40 SYRINGE (ML) INJECTION PRN
Status: DISCONTINUED | OUTPATIENT
Start: 2025-07-11 | End: 2025-07-12 | Stop reason: HOSPADM

## 2025-07-11 RX ORDER — MORPHINE SULFATE 2 MG/ML
2 INJECTION, SOLUTION INTRAMUSCULAR; INTRAVENOUS
Status: DISCONTINUED | OUTPATIENT
Start: 2025-07-11 | End: 2025-07-12 | Stop reason: HOSPADM

## 2025-07-11 RX ORDER — ENOXAPARIN SODIUM 100 MG/ML
40 INJECTION SUBCUTANEOUS DAILY
Status: DISCONTINUED | OUTPATIENT
Start: 2025-07-11 | End: 2025-07-12 | Stop reason: HOSPADM

## 2025-07-11 RX ADMIN — ACETAMINOPHEN 650 MG: 325 TABLET ORAL at 16:43

## 2025-07-11 RX ADMIN — SODIUM CHLORIDE, PRESERVATIVE FREE 10 ML: 5 INJECTION INTRAVENOUS at 16:44

## 2025-07-11 RX ADMIN — OXYCODONE AND ACETAMINOPHEN 1 TABLET: 5; 325 TABLET ORAL at 18:21

## 2025-07-11 RX ADMIN — ROSUVASTATIN CALCIUM 20 MG: 20 TABLET, FILM COATED ORAL at 20:13

## 2025-07-11 RX ADMIN — ZOLPIDEM TARTRATE 5 MG: 5 TABLET ORAL at 22:20

## 2025-07-11 RX ADMIN — ASPIRIN 81 MG: 81 TABLET, CHEWABLE ORAL at 20:13

## 2025-07-11 RX ADMIN — SODIUM CHLORIDE: 0.9 INJECTION, SOLUTION INTRAVENOUS at 16:45

## 2025-07-11 RX ADMIN — SODIUM CHLORIDE, PRESERVATIVE FREE 10 ML: 5 INJECTION INTRAVENOUS at 20:15

## 2025-07-11 RX ADMIN — PANTOPRAZOLE SODIUM 40 MG: 40 TABLET, DELAYED RELEASE ORAL at 20:14

## 2025-07-11 RX ADMIN — ASPIRIN 325 MG: 325 TABLET ORAL at 11:26

## 2025-07-11 RX ADMIN — SODIUM CHLORIDE, PRESERVATIVE FREE 10 ML: 5 INJECTION INTRAVENOUS at 20:14

## 2025-07-11 ASSESSMENT — PAIN DESCRIPTION - ORIENTATION
ORIENTATION: RIGHT
ORIENTATION: MID

## 2025-07-11 ASSESSMENT — ENCOUNTER SYMPTOMS
CHOKING: 0
NAUSEA: 0
CHEST TIGHTNESS: 1
STRIDOR: 0
SHORTNESS OF BREATH: 1
WHEEZING: 0
GASTROINTESTINAL NEGATIVE: 1
ABDOMINAL PAIN: 1
VOICE CHANGE: 1
COUGH: 0
VOMITING: 0
SHORTNESS OF BREATH: 0

## 2025-07-11 ASSESSMENT — PAIN SCALES - GENERAL
PAINLEVEL_OUTOF10: 3
PAINLEVEL_OUTOF10: 1
PAINLEVEL_OUTOF10: 4
PAINLEVEL_OUTOF10: 0
PAINLEVEL_OUTOF10: 1
PAINLEVEL_OUTOF10: 5
PAINLEVEL_OUTOF10: 0
PAINLEVEL_OUTOF10: 1
PAINLEVEL_OUTOF10: 0
PAINLEVEL_OUTOF10: 0
PAINLEVEL_OUTOF10: 3
PAINLEVEL_OUTOF10: 3

## 2025-07-11 ASSESSMENT — PAIN - FUNCTIONAL ASSESSMENT
PAIN_FUNCTIONAL_ASSESSMENT: ACTIVITIES ARE NOT PREVENTED
PAIN_FUNCTIONAL_ASSESSMENT: ACTIVITIES ARE NOT PREVENTED
PAIN_FUNCTIONAL_ASSESSMENT: 0-10
PAIN_FUNCTIONAL_ASSESSMENT: ACTIVITIES ARE NOT PREVENTED

## 2025-07-11 ASSESSMENT — PAIN DESCRIPTION - PAIN TYPE
TYPE: SURGICAL PAIN
TYPE: ACUTE PAIN

## 2025-07-11 ASSESSMENT — PAIN DESCRIPTION - LOCATION
LOCATION: CHEST
LOCATION: GROIN
LOCATION: CHEST
LOCATION: GROIN
LOCATION: GROIN

## 2025-07-11 ASSESSMENT — PAIN DESCRIPTION - FREQUENCY
FREQUENCY: CONTINUOUS

## 2025-07-11 ASSESSMENT — PAIN DESCRIPTION - ONSET
ONSET: ON-GOING

## 2025-07-11 ASSESSMENT — PAIN DESCRIPTION - DESCRIPTORS
DESCRIPTORS: PRESSURE
DESCRIPTORS: DISCOMFORT
DESCRIPTORS: SORE
DESCRIPTORS: SORE

## 2025-07-11 NOTE — ED NOTES
Patient Name: Terry Roy  : 1950 75 y.o.  MRN: [id number]  ED Room #: B19/B19-19     Chief complaint:   Chief Complaint   Patient presents with    Chest Pain     Pt. Presents to ED with c/o chest pain and pressure. Took one 81mg aspirin.      Hospital Problem/Diagnosis:   Hospital Problems           Last Modified POA    * (Principal) Atypical chest pain 7/10/2025 Yes         O2 Flow Rate:O2 Device: None (Room air)   (if applicable)  Cardiac Rhythm:   (if applicable)  Active LDA's:   Peripheral IV 07/10/25 Right Antecubital (Active)            How does patient ambulate? Low Fall Risk (Ambulates by themselves without support    2. How does patient take pills? Whole with Water    3. Is patient alert? Alert    4. Is patient oriented? To Person, To Place, To Time, To Situation, and Follows Commands    5.   Patient arrived from:  home  Facility Name: ___________________________________________    6. If patient is disoriented or from a Skill Nursing Facility has family been notified of admission? No    7. Patient belongings? Belongings: Cell Phone and Clothing    Disposition of belongings? Kept with Patient     8. Any specific patient or family belongings/needs/dynamics?   a. Alert and ambulatory    9. Miscellaneous comments/pending orders?  a. none      If there are any additional questions please reach out to the Emergency Department.      Escuadra, Marylee, RN  [phone]

## 2025-07-11 NOTE — SEDATION DOCUMENTATION
[facility]                 Date of Procedure: 7/11/2025  Patient's Name: Terry Roy  YOB: 1950   Medical Record Number: [id number]    Indication:  Chest pressure, rule out unstable coronary syndrome    Consent:   I have discussed with the patient and/or the patient representative the indication, alternatives, and the possible risks and/or complications of the planned procedure and the anesthesia methods. The patient and/or patient representative appear to understand and agree to proceed.    Past Medical History:   Diagnosis Date    Acid reflux     Asthma     CAD (coronary artery disease)     COPD (chronic obstructive pulmonary disease) (HCC)     mild    Hyperlipidemia     Insomnia     Vertigo        Past Surgical History:   Procedure Laterality Date    COLONOSCOPY      05/05/2017, diverticulosis, repeat 5/15/2026 Dr. Thaddeus Lewis    COLONOSCOPY N/A 10/5/2022    COLONOSCOPY POLYPECTOMY SNARE/COLD BIOPSY performed by Sam PISANO MD at [facility] ENDOSCOPY    CORONARY ARTERY BYPASS GRAFT  2019    2 Vessels     ESOPHAGEAL DILATATION      ESOPHAGOSCOPY N/A 11/21/2022    ESOPHAGOSCOPY BIOPSY performed by Sam PISANO MD at [facility] ENDOSCOPY    INGUINAL HERNIA REPAIR Right     PROSTATE SURGERY  2017    TURP       Prior to Admission medications    Medication Sig Start Date End Date Taking? Authorizing Provider   fluticasone (FLONASE) 50 MCG/ACT nasal spray 1 spray by Each Nostril route daily   Yes Candie Ng MD   simethicone (MYLICON) 80 MG chewable tablet Take 1 tablet by mouth 2 times daily as needed for Flatulence   Yes Candie Ng MD   triazolam (HALCION) 0.25 MG tablet Take 1 tablet by mouth nightly as needed (sleep). Max Daily Amount: 250 mcg   Yes Candie Ng MD   albuterol sulfate HFA (PROVENTIL;VENTOLIN;PROAIR) 108 (90 Base) MCG/ACT inhaler USE 2 INHALATIONS ORALLY 4 TIMES DAILY AS NEEDED FOR  WHEEZING 6/17/25  Yes Jacki Singleton MD   sildenafil

## 2025-07-11 NOTE — ED PROVIDER NOTES
THE [facility]  EMERGENCY DEPARTMENT ENCOUNTER          EM RESIDENT NOTE       Date of evaluation: 7/10/2025    Chief Complaint     Chest Pain (Pt. Presents to ED with c/o chest pain and pressure. Took one 81mg aspirin. )      History of Present Illness     Terry Roy is a 75 y.o. male with CAD s/p two-vessel CABG, asthma, COPD, HLD, who presents for evaluation of chest pain.  Patient reports the pain is present at rest but is not worse with exertion.  Associated with shortness of breath but there is no pleuritic component.  No nausea, diaphoresis.  She reports the pain physic a pressure sensation in the middle of his chest like being pushed.  Reports that he was scheduled to see cardiology next week given abnormal CTA.    MEDICAL DECISION MAKING / ASSESSMENT / PLAN     INITIAL VITALS: BP: (!) 186/79, Temp: 97.5 °F (36.4 °C), Pulse: 51, Respirations: 14, SpO2: 100 %       On initial presentation, he is afebrile, hemodynamically stable, appears well.  Physical examination is unremarkable and he has clear breath sounds.  Sinus bradycardia but his EKG shows no definitive evidence for ischemia. Labs are normal with initial troponin being 7.  Delta still pending.  BNP is normal.  Heart score is elevated however and with his abnormal CTA showing diffuse disease, there is concern this may be cardiac in nature.     At this time the patient has been admitted for further evaluation and management. The patient will continue to be monitored here in the emergency department until which time they are moved to their new treatment location.  Patient is in agreement with this plan and all questions have been appropriately answered.        Medical Decision Making  Amount and/or Complexity of Data Reviewed  Labs: ordered.  Radiology: ordered.  ECG/medicine tests: ordered.    Risk  OTC drugs.  Decision regarding hospitalization.        This patient was also evaluated by the attending physician. All care plans werediscussed

## 2025-07-11 NOTE — ED PROVIDER NOTES
ED Attending Attestation Note     Date of evaluation: 7/10/2025    This patient was seen by the resident.  I have seen and examined the patient, agree with the workup, evaluation, management and diagnosis. The care plan has been discussed.  I have reviewed the ECG and concur with the resident's interpretation.  My assessment reveals a 75-year-old male with history of CAD status post previous CABG, COPD and asthma who presents to the emergency department chest pain.  Patient states pain is waxing and waning over the last few days.  He endorses having shortness of breath and discomfort with inspiration however both of these have been present since his previous CABG and are not different acutely.  He denies any recent fevers.  He denies any recent immobilization or travel.    On examination find adult male, speaking in complete sentences.  He has no increased work of breathing or accessory muscle use during respiration.  He is moving all extremities.  Lungs are clear to auscultation bilaterally.    Will proceed with EKG, chest x-ray and laboratory workup.    Anticipate likely admission given elevated heart score at baseline.    Jace Galvin MD MPH   Physician.        Jace Galvin MD  07/12/25 [id number]

## 2025-07-11 NOTE — DISCHARGE INSTRUCTIONS
You were admitted for chest pain which was likely due to a conditioned called costochondritis (musculoskeletal irritation). Because of your high risk of cardiac disease and history of coronary bypass, the cardiologist recommended a coronary angiogram. The angiogram of your heart vessels (including the bypass) showed no evidence of acute blockage. Please do the following after you go home:    See Dr. Mo at your scheduled appointment for Thursday. He can go over the results of your angiogram in more detail.   See Dr. Singleton in clinic within 10 business days.  Please continue aspirin 81 mg daily and Crestor. Ask your doctors about your cholesterol levels; they were checked while in the hospital. They may want to adjust your Crestor dosage depending on the results.    It was a pleasure taking care of you.     If you develop severe chest pain that does not resolve with resting and is not reproducible with stretching your arms/chest or with taking deep breaths, call your PCP or cardiologist's office to ask their recommendation. If you cannot reach the doctor, please call 911 and go to the nearest emergency department.

## 2025-07-11 NOTE — DISCHARGE SUMMARY
INTERNAL MEDICINE DEPARTMENT  DISCHARGE SUMMARY    Patient ID: Terry Roy                                             Discharge Date: 7/12/2025   Patient's PCP: Jacki Singleton MD                                          Discharge Physician: Harry Galloway MD  Admit Date: 7/10/2025   Admitting Physician: Buster Angeles MD    Problems Present on Admission:   Chest pain   Unstable angina (HCC)      DISCHARGE DIAGNOSES:  1.  Chest Pain, ACS ruled out with LHC  2   HTN  3  HLD well-optimized on statin therapy  4. Sinus bradycardia    HPI:  Terry Roy is a 75 year old male with a PMHx of COPD, CAD s/p CABG LIMA to LAD, SVG to RCA, HLD, GERD, migraines, and insomnia who presented to the ED on 7/10/25 due to pressure-like chest pain. Around 8pm on 7/10/25, he started to have chest pain at rest. It was only on the right side and was described as a \"pressure\" with no radiation. Endorses some dyspnea at the time but denied feelings of palpitations or diaphoresis. Reproducible with stretching arms and chest.    HOSPITAL COURSE:  Patient experienced chest pressure at rest with no radiation that lasted for 2 hours. No apparent triggering or relieving factors. Endorsed some dyspnea but denies lightheadedness, palpitations, or syncope at that time. He has chest pain at baseline that is reproducible with certain movements but he described the character of the pain as different from baseline. He took his BP at home and found it to be significantly elevated despite medication compliance. CBC, renal panel, hs-cTn, and NT-proBNP were unremarkable. EKG showed no acute ischemic changes compared to EKG in 12/2024. Cardiology was consulted and opted to perform coronary angiography. LHC showed patent LIMA-LAD and SVG-RCA grafts. OM 2 was 100% occluded at the proximal segment of the artery but appears to have bypass grafting. Hypertensive during hospital stay, which is suspected to be from whitecoat hypertension given controlled BPs  MEDICATION:     Medication List        CONTINUE taking these medications      acetaminophen 500 MG tablet  Commonly known as: TYLENOL     albuterol sulfate  (90 Base) MCG/ACT inhaler  Commonly known as: PROVENTIL;VENTOLIN;PROAIR  USE 2 INHALATIONS ORALLY 4 TIMES DAILY AS NEEDED FOR  WHEEZING     aspirin 81 MG EC tablet     butalbital-acetaminophen-caffeine -40 MG per tablet  Commonly known as: FIORICET, ESGIC  Take 1 tablet by mouth every 4 hours as needed for Headaches Max Daily Amount: 6 tablets     clindamycin 1 % gel  APPLY TOPICALLY 2 TIMES A DAY     dicyclomine 20 MG tablet  Commonly known as: BENTYL  Take 1 tablet by mouth every 6-8 hours as needed (Lower abdominal pain)     fish oil 1000 MG capsule     fluticasone 50 MCG/ACT nasal spray  Commonly known as: FLONASE     meclizine 25 MG tablet  Commonly known as: ANTIVERT  TAKE 1 TABLET BY MOUTH THREE TIMES DAILY AS NEEDED FOR DIZZINESS     metoprolol succinate 25 MG extended release tablet  Commonly known as: TOPROL XL  TAKE ONE TABLET BY MOUTH DAILY     pantoprazole 40 MG tablet  Commonly known as: PROTONIX  Take 1 tablet by mouth daily     rosuvastatin 20 MG tablet  Commonly known as: CRESTOR  Take 1 tablet by mouth daily To be filled by ProviderTo be filled by Provider     sildenafil 50 MG tablet  Commonly known as: Viagra  Take 1 tablet by mouth as needed for Erectile Dysfunction     simethicone 80 MG chewable tablet  Commonly known as: MYLICON     triazolam 0.25 MG tablet  Commonly known as: HALCION     ZyrTEC 10 MG chewable tablet  Generic drug: cetirizine            Activity: activity as tolerated  Diet: cardiac diet  Wound Care: none needed    Time Spent on discharge is more than 35 minutes.    Signed:  Luiza Payne  MS3  Medical Student  7/12/2025    I reviewed with the medical student the patient's medical history and findings on the physical examination and repeated key portions of the exam.  I discussed with the medical student the

## 2025-07-11 NOTE — PROGRESS NOTES
Clinical Pharmacy Consult Note  Medication History     Admit Date: 7/10/2025    List of current medications patient is taking is complete. Home Medication list in Epic updated to reflect changes noted below.    Source of information: Patient, dispense history     Patient's home pharmacy:   [id number] - [place], [place] - [address] - P [phone] - F [phone]  [address] [facility]  [place] [address]  Phone: [phone] Fax: [phone]    [id number] - [place], [place] - [address] - P [phone] - F [phone]  [address]  [place] [address]  Phone: [phone] Fax: [phone]      Changes made to medication list:     Medications removed - patient reports no longer taking:   Ascorbic acid 500 mg capsule  Budesonide-glycopyrrolate-formoterol 160-9-4.8 mcg/act inhaler   Cholecalciferol 50 mcg capsule   Cyanocobalamin 1000 mcg tablet   Multivitamin minerals adult 50+ supplement     Medications added:   Triazolam 0.25 mg tablet     Medication doses adjusted per patient:   Acetaminophen 500 mg tablet - medication strength changed from 325 mg to 500 mg   Fluticasone 50 mcg/act nasal spray - dose instructions updated from take by nasal route to take 1 spray in each nostril daily   Omega-3 fatty acids 1000 mg capsule - dose added and frequency changed from one capsule three times daily to three capsules every evening   Pantoprazole 40 mg tablet - dose frequency changed from daily to twice daily  Simethicone 80 mg chewable tablet - dose added and frequency changed from by mouth as needed to twice daily as needed     Other notes:   Patient reports he is no longer taking any vitamins as of 3 days ago.   Butalbital-acetaminophen-caffeine -40 mg tablet - no dispense history available, patient reports taking, stated he got an approximately 180 tablet supply long enough ago that the refill is . Unable to determine why it does not appear in dispense history   Pantoprazole  40 mg tablet - patient reports he has been taking twice daily since his GI physician instructed him to following an endoscopy, stated he is trying to get a follow up to discuss next steps and if he should continue taking it twice daily or go back to once.   Triazolam 0.25 mg tablet - patient takes nightly as needed for sleep, reports that he tries to take it as little as possible but stated he does take it almost every night. Per dispense history last filled 03/11/2025 for a 30 day 30 tablet supply, patient states that that is not the most recent refill, reports his last refill was actually some time in June, unclear why more recent dispense does not show up.     Current Outpatient Medications   Medication Instructions    acetaminophen (TYLENOL) 1,000 mg, Oral, EVERY 6 HOURS PRN    albuterol sulfate HFA (PROVENTIL;VENTOLIN;PROAIR) 108 (90 Base) MCG/ACT inhaler USE 2 INHALATIONS ORALLY 4 TIMES DAILY AS NEEDED FOR  WHEEZING    aspirin 81 mg, Oral, DAILY    butalbital-acetaminophen-caffeine (FIORICET, ESGIC) -40 MG per tablet 1 tablet, Oral, EVERY 4 HOURS PRN    cetirizine (ZYRTEC) 10 mg, Oral, DAILY    clindamycin 1 % gel APPLY TOPICALLY 2 TIMES A DAY    dicyclomine (BENTYL) 20 mg, Oral, EVERY 6-8 HOURS PRN    fluticasone (FLONASE) 50 MCG/ACT nasal spray 1 spray, Each Nostril, DAILY    meclizine (ANTIVERT) 25 mg, Oral, 3 TIMES DAILY PRN    metoprolol succinate (TOPROL XL) 25 MG extended release tablet TAKE ONE TABLET BY MOUTH DAILY    Omega-3 Fatty Acids (FISH OIL) 1000 MG capsule 3 capsules, Oral, EVERY EVENING    pantoprazole (PROTONIX) 40 mg, Oral, DAILY    rosuvastatin (CRESTOR) 20 mg, Oral, DAILY, To be filled by ProviderTo be filled by Provider    sildenafil (VIAGRA) 50 mg, Oral, PRN    simethicone (MYLICON) 80 mg, 2 TIMES DAILY PRN    triazolam (HALCION) 0.25 mg, NIGHTLY PRN       Thank you for consulting pharmacy,    Yamel Hilton, PharmD Candidate [id number]

## 2025-07-11 NOTE — CONSULTS
INTERVENTIONAL CARDIOLOGY - INITIAL CONSULT        CHIEF COMPLAINT  Chief Complaint   Patient presents with    Chest Pain     Pt. Presents to ED with c/o chest pain and pressure. Took one 81mg aspirin.          HISTORY OF PRESENTING ILLNESS  75-year-old male with history significant for CAD status post CABG [LIMA to LAD, SVG to RCA], hyperlipidemia, GERD, migraines, insomnia who presented to the emergency room yesterday with chest pain.  Patient reports experiencing chest discomfort the evening before that was described as \"someone sitting on my chest\".  In the ER, biomarkers were negative x 2, EKG nonconcerning  admitting internist.  Patient describes the aforementioned chest pressure that has been occurring intermittently since the night before presentation to the hospital.  Patient's son is present at bedside.  He is currently pain-free.  Also of note, he describes previous anginal symptoms as right sided chest discomfort.  Also endorses some dyspnea but no lightheadedness, syncope, or palpitations.    PAST MEDICAL HISTORY   has a past medical history of Acid reflux, Asthma, CAD (coronary artery disease), COPD (chronic obstructive pulmonary disease) (HCC), Hyperlipidemia, Insomnia, and Vertigo.    PAST SURGICAL HISTORY   has a past surgical history that includes Coronary artery bypass graft (2019); Prostate surgery (2017); Colonoscopy; Esophagus dilation; Inguinal hernia repair (Right); Colonoscopy (N/A, 10/5/2022); and Esophagoscopy (N/A, 11/21/2022).     SOCIAL HISTORY   reports that he quit smoking about 5 years ago. His smoking use included cigarettes. He started smoking about 55 years ago. He has a 50 pack-year smoking history. He has never been exposed to tobacco smoke. He has never used smokeless tobacco. He reports that he does not currently use alcohol. He reports that he does not use drugs.       FAMILY HISTORY  No family history of premature coronary artery disease, aortic disease, or valve  MOUTH DAILY 1/21/25  Yes Jacki Singleton MD   butalbital-acetaminophen-caffeine (FIORICET, ESGIC) -40 MG per tablet Take 1 tablet by mouth every 4 hours as needed for Headaches Max Daily Amount: 6 tablets 11/12/24  Yes Jacki Singleton MD   Omega-3 Fatty Acids (FISH OIL) 1000 MG capsule Take 3 capsules by mouth every evening   Yes Candie Ng MD   cetirizine (ZYRTEC) 10 MG chewable tablet Take 1 tablet by mouth daily   Yes Candie Ng MD   aspirin 81 MG EC tablet Take 1 tablet by mouth daily   Yes Candie Ng MD   acetaminophen (TYLENOL) 500 MG tablet Take 2 tablets by mouth every 6 hours as needed for Pain   Yes Candie Ng MD        ALLERGIES  Patient has no known allergies.       REVIEW OF SYSTEMS  14 point ROS done and negative other than HPI      PHYSICAL EXAMINATION    Vitals:    07/11/25 0818   BP: (!) 145/86   Pulse:    Resp:    Temp:    SpO2:     Weight - Scale: 83 kg (183 lb)       Head - normocephalic, atraumatic  Neck - no JVD  Lungs - CTAB  Heart -RRR, +S1/S2, no murmurs/rubs gallops  Abdomen: soft, non-tender, non-distended  Extremities- no clubbing, no cyanosis, no edema    LABS  .CBC:   Lab Results   Component Value Date/Time    WBC 5.0 07/10/2025 09:22 PM    RBC 4.63 07/10/2025 09:22 PM    HGB 14.9 07/10/2025 09:22 PM    HCT 42.7 07/10/2025 09:22 PM    MCV 92.2 07/10/2025 09:22 PM    MCH 32.3 07/10/2025 09:22 PM    MCHC 35.0 07/10/2025 09:22 PM    RDW 12.8 07/10/2025 09:22 PM     07/10/2025 09:22 PM    MPV 8.7 07/10/2025 09:22 PM     CMP:    Lab Results   Component Value Date/Time     07/10/2025 09:22 PM    K 3.7 07/10/2025 09:22 PM     07/10/2025 09:22 PM    CO2 28 07/10/2025 09:22 PM    BUN 16 07/10/2025 09:22 PM    CREATININE 0.8 07/10/2025 09:22 PM    GFRAA >60 08/23/2022 07:02 AM    AGRATIO 1.9 04/10/2025 06:41 AM    LABGLOM >90 07/10/2025 09:22 PM    LABGLOM >60 01/02/2024 06:44 AM    GLUCOSE 126 07/10/2025 09:22 PM    CALCIUM 9.5

## 2025-07-11 NOTE — PLAN OF CARE
Problem: Safety - Adult  Goal: Free from fall injury  Outcome: Progressing  Flowsheets (Taken 7/11/2025 1706)  Free From Fall Injury: Instruct family/caregiver on patient safety  Note: Pt will remain free from accidental injury this shift. Pt has fall risk measures (fall risk bracelet, fall risk sign, fall risk cloth, non-slip socks, dome light on) in place. Pt bed is in low position, bed alarm on, bed wheels locked, call light within reach, bedside table within reach, chair wheels locked, chair alarm on.      Problem: Discharge Planning  Goal: Discharge to home or other facility with appropriate resources  Outcome: Progressing  Flowsheets (Taken 7/11/2025 1706)  Discharge to home or other facility with appropriate resources:   Identify barriers to discharge with patient and caregiver   Arrange for needed discharge resources and transportation as appropriate   Identify discharge learning needs (meds, wound care, etc)     Problem: ABCDS Injury Assessment  Goal: Absence of physical injury  Outcome: Progressing  Flowsheets (Taken 7/11/2025 1706)  Absence of Physical Injury: Implement safety measures based on patient assessment     Problem: Pain  Goal: Verbalizes/displays adequate comfort level or baseline comfort level  Outcome: Progressing  Flowsheets (Taken 7/11/2025 1706)  Verbalizes/displays adequate comfort level or baseline comfort level:   Encourage patient to monitor pain and request assistance   Assess pain using appropriate pain scale   Administer analgesics based on type and severity of pain and evaluate response   Implement non-pharmacological measures as appropriate and evaluate response   Consider cultural and social influences on pain and pain management   Notify Licensed Independent Practitioner if interventions unsuccessful or patient reports new pain  Note: Pt reported soreness at this groin site. Pt was given tylenol, will continue to monitor pt's pain level.     Problem: Respiratory - Adult  Goal:

## 2025-07-11 NOTE — PROGRESS NOTES
Internal Medicine Progress Note    Patient: Terry Roy   : 1950   Admit Date: 7/10/2025     Date: 2025     Interval History     Patient sitting up in bed in NAD.   Patient has baseline feelings of chest tightness that is reproducible.  No significant overnight events.   VS: stable except elevated BP  Coronary angiogram planned at 12pm      HPI  Terry Ryo is a 75 year old man with PMHx of COPD, CAD s/p CABG (2019), HLD, GERD, migraines, and insomnia who presents with chest pain.      Yesterday, patient woke up around 6am, drank coffee, went on a walk around 7am for about 45 minutes, came home and showered as per his usual morning routine. He reports using his albuterol inhaler 2 hours later due to SOB. He ate dinner around 4:30/5:30pm and consumed salad with chicken and vinegar/oil dressing and pretzels. He watched TV for the rest of the day and experienced chest pressure at 8pm while he was sitting and watching TV.  Nothing that day nor food consumed was out of the ordinary for him.   Chest pressure was only on the right and did not radiate. Patient has baseline chest pain and this was different from baseline. States this is not necessarily pain, he rates pain 1/10. Patient typically measures BP at home twice a day. During chest pressure episode, patient measured his BP at 190-200+/110. His usual BP at home is reported as about 120s/69. He states high for him is considered 130s/80s. He felt light while he was walking and \"spacey\" but he was breathing normally and was not having any GI symptoms at that time. He had not experienced GI symptoms that entire day. Also experienced no dizziness, no N/V, no loss of strength, no lightheadedness, and no chest tenderness to palpation at time of episode. He took a baby aspirin and came to the ED. He started experiencing difficulty breathing while at the ED wondering when he would be taken back but believes this may have been due to anxiety. Chest  with medical student (Luiza Payne), agree with student's note with above changes.      Chris Akhtar MD, PGY-2  Internal Medicine Resident  Contact via [employer]     Addendum to Resident H& P/Progress note:  I have personally seen,examined and evaluated the patient. I have reviewed the current history, physical findings, labs and assessment and plan and agree with note as documented by resident MD ( )    The patient underwent coronary angio by Dr Angeles earlier today:    IMPRESSION/SUMMARY  Coronary Angiography and left heart catheterization performed for the indication of unstable angina  2.   Findings are noteworthy for patent PDA vein graft, patent LIMA to LAD and closed OM vein graft with otherwise no significant native coronary disease and normal LV filling pressure.     RECOMMENDATION:  Aggressive medical treatment and risk factor modification.  2.   Continue aspirin 81 mg daily & high intensity statin therapy.  3.  Patient to follow-up in cardiology clinic 1 week post discharge.      Harry Galloway MD, FACP

## 2025-07-11 NOTE — PROGRESS NOTES
4 Eyes Skin Assessment     NAME:  Terry Roy  YOB: 1950  MEDICAL RECORD NUMBER:  [id number]    The patient is being assessed for  Admission    I agree that at least one RN has performed a thorough Head to Toe Skin Assessment on the patient. ALL assessment sites listed below have been assessed.      Areas assessed by both nurses:    Head, Face, Ears, Shoulders, Back, Chest, Arms, Elbows, Hands, Sacrum. Buttock, Coccyx, Ischium, Legs. Feet and Heels, and Under Medical Devices       R groin puncture site  Does the Patient have a Wound? No noted wound(s)       Anirudh Prevention initiated by RN: No  Wound Care Orders initiated by RN: No    Pressure Injury (Stage 1,2,3,4, Unstageable, DTI, NWPT, and Complex wounds) if present, place Wound referral order by RN under : No    New Ostomies, if present place, Ostomy referral order under : No     Nurse 1 eSignature: Electronically signed by Jose Og RN on 7/11/25 at 6:03 PM EDT    **SHARE this note so that the co-signing nurse can place an eSignature**    Nurse 2 eSignature: Electronically signed by Seferino Howard RN on 7/11/25 at 6:06 PM EDT

## 2025-07-11 NOTE — PROCEDURES
Cardiac Cath / Intervention Procedure Note    Date of procedure: 07/11/25    Indication for procedure: unstable angina    DIAGNOSTIC PROCEDURES PERFORMED:  Coronary and bypass graft angiography with left heart catheterization  Aortic root angiography.   Supervision and administration of moderate sedation for 90 minutes.           DESCRIPTION OF PROCEDURE:  Patient placed on cardiac monitor, pulse oximetry, and supplemental oxygen as necessary.  The area was prepped and draped in a sterile fashion and infiltrated with lidocaine 2%.  The right femoral artery was accessed via the Seldinger technique using a micropuncture kit under fluoroscopic guidance.  The 6F sheath was inserted, and through this the catheters were advanced to the left coronary artery (LCA), right coronary artery (RCA) and left ventricle (LV).  Placement confirmed by fluoroscopy.  Angiography was subsequently performed and hemodynamics recorded (results are reported below).  Femoral arteriography demonstrated appropriate arteriotomy site placement for closure device.  The site was subsequently closed with angioseal.      ANGIOGRAM/CORONARY ARTERIOGRAM:       Dominance right dominant coronary system   Left main artery  Large-caliber vessel with mild luminal irregularity   Left anterior descending artery  Large-caliber vessel with moderate diffuse plaquing in proximal vessel and 100% occlusion in mid vessel [distal vessel fills via LIMA graft]   Left circumflex artery Large-caliber vessel with mild diffuse plaquing in proximal artery, mild disease distally; artery gives rise to 2 prominent obtuse marginal branches;  OM1 is a medium size vessel with no significant disease  OM 2 is 100% occluded at the proximal segment of the artery and appears to have bypass grafting from a vein   Right coronary artery  100% proximal stenosis [distal vessel fills via PDA vein graft]   PDA vein graft  Widely patent graft with no significant disease   OM vein graft

## 2025-07-11 NOTE — H&P
Internal Medicine H&P    Date: 7/10/2025   Patient: Terry Roy   Patient : 1950     CC: Chest Pain (Pt. Presents to ED with c/o chest pain and pressure. Took one 81mg aspirin. )       Subjective   HPI:   Terry Roy is a pleasant 75 year old male with a pmh of COPD, CAD s/p CABG LIMA to LAD, SVG to RCA, hyperlipidemia, GERD, migraines, insomnia who presented to the ED due to pressure like chest pain.  He states that around 8 p.m. he started to have some chest pain he states that it is not fully like feeling of someone sitting on chest, but some pressure symptom that is substernal it is not worse with movement or exertion and is actually better with movement. He denied any feeling of palpitations or diaphoresis. He did not take any medication for this. He did state that he has been dealing with some chest pain that seemed more GI in nature where protonix have actually hleped, but this pain was not like that.   He actually is quite active and walked 3 miles no problem earlier in the day.  He as well does have a history of CAD s/p CABG around u6 years ago where his pain then was was on the right side of his chest and per him wasnot the typical acs chest pain.  Due to his known history of atypical chest pain that did require a CABG he was concerned and came to the ED.  Of note, being that he has had a long history of unfully explained chest pain with some shortness of breath which was possibly more GI in nature his PCP started looking more in to cardiac cause and obtained a cxt cardiac calcium which showed: Coronary calcium score of 2949. Consider further evaluation if multivessel or left main predominant disease is present.   This is in the 90 th percentile for age and gender.  He was to follow up with cardiology on .    In the ED he was slightly hypertensive to 140's80's. His troponin were 7 and 7, and his EKG showed sinus bradycardia.  He received 325 aspirin and was admitted to the  2215 (!) 142/85 -- -- 52 23 94 % -- --   07/10/25 2106 (!) 186/79 97.5 °F (36.4 °C) Oral 51 14 100 % 1.829 m (6') 83 kg (183 lb)       Physical Exam  Constitutional:       General: He is not in acute distress.     Appearance: Normal appearance. He is normal weight. He is not ill-appearing.   HENT:      Head: Normocephalic and atraumatic.   Eyes:      Pupils: Pupils are equal, round, and reactive to light.   Cardiovascular:      Rate and Rhythm: Normal rate.      Heart sounds: No murmur heard.     No friction rub.   Pulmonary:      Effort: Pulmonary effort is normal. No respiratory distress.      Breath sounds: No wheezing, rhonchi or rales.   Chest:      Chest wall: No tenderness.   Abdominal:      General: There is no distension.      Palpations: Abdomen is soft.      Tenderness: There is no abdominal tenderness. There is no guarding.   Musculoskeletal:         General: No swelling or tenderness.      Comments: Chest pain worse/reproducible when pt moves his arms laterally at the same time   Skin:     General: Skin is warm and dry.   Neurological:      General: No focal deficit present.      Mental Status: He is alert and oriented to person, place, and time.      Cranial Nerves: No cranial nerve deficit.      Sensory: No sensory deficit.      Motor: No weakness.   Psychiatric:         Mood and Affect: Mood normal.         Behavior: Behavior normal.         Thought Content: Thought content normal.         Judgment: Judgment normal.          Labs:  CBC:   Recent Labs     07/10/25  2122   WBC 5.0   HGB 14.9   HCT 42.7          BMP:   Recent Labs     07/10/25  2122      K 3.7      CO2 28   BUN 16   CREATININE 0.8   GLUCOSE 126*     Pro-BNP:   Recent Labs     07/10/25  2122   PROBNP 106         Radiology:  XR CHEST (2 VW)   Final Result      No acute disease.      Electronically signed by Tyler Acosta MD        [facility] 2019  CAD, severe 2 vessel dz. LAD prox 90%. RCA 99% stenosis unable to cross with

## 2025-07-11 NOTE — PROGRESS NOTES
Cardiology Chart Update    I discussed the results at length with patient's son.  Given angiography (no evidence of unstable coronary syndrome, patent vein graft x 1, patent LIMA, closed OM vein graft (likely did not occur recently or acutely), I have no objection to discharging him from a cardiology point of view tomorrow morning.  He should follow-up with a cardiologist in 1-2 weeks.      .Buster Angeles MD  Interventional Cardiology  [facility]  7/11/2025

## 2025-07-12 VITALS
SYSTOLIC BLOOD PRESSURE: 146 MMHG | RESPIRATION RATE: 16 BRPM | DIASTOLIC BLOOD PRESSURE: 95 MMHG | HEART RATE: 58 BPM | HEIGHT: 72 IN | BODY MASS INDEX: 23.98 KG/M2 | WEIGHT: 177.03 LBS | TEMPERATURE: 97.4 F | OXYGEN SATURATION: 94 %

## 2025-07-12 LAB
ALBUMIN SERPL-MCNC: 3.8 G/DL (ref 3.4–5)
ANION GAP SERPL CALCULATED.3IONS-SCNC: 9 MMOL/L (ref 3–16)
BASOPHILS # BLD: 0 K/UL (ref 0–0.2)
BASOPHILS NFR BLD: 0.1 %
BUN SERPL-MCNC: 17 MG/DL (ref 7–20)
CALCIUM SERPL-MCNC: 8.7 MG/DL (ref 8.3–10.6)
CHLORIDE SERPL-SCNC: 105 MMOL/L (ref 99–110)
CHOLEST SERPL-MCNC: 88 MG/DL (ref 0–199)
CO2 SERPL-SCNC: 27 MMOL/L (ref 21–32)
CREAT SERPL-MCNC: 0.8 MG/DL (ref 0.8–1.3)
DEPRECATED RDW RBC AUTO: 12.6 % (ref 12.4–15.4)
EOSINOPHIL # BLD: 0.2 K/UL (ref 0–0.6)
EOSINOPHIL NFR BLD: 2.5 %
GFR SERPLBLD CREATININE-BSD FMLA CKD-EPI: >90 ML/MIN/{1.73_M2}
GLUCOSE SERPL-MCNC: 112 MG/DL (ref 70–99)
HCT VFR BLD AUTO: 40.2 % (ref 40.5–52.5)
HDLC SERPL-MCNC: 39 MG/DL (ref 40–60)
HGB BLD-MCNC: 13.9 G/DL (ref 13.5–17.5)
LDLC SERPL CALC-MCNC: 21 MG/DL
LYMPHOCYTES # BLD: 0.9 K/UL (ref 1–5.1)
LYMPHOCYTES NFR BLD: 13.7 %
MAGNESIUM SERPL-MCNC: 2.09 MG/DL (ref 1.8–2.4)
MCH RBC QN AUTO: 32.1 PG (ref 26–34)
MCHC RBC AUTO-ENTMCNC: 34.6 G/DL (ref 31–36)
MCV RBC AUTO: 92.7 FL (ref 80–100)
MONOCYTES # BLD: 0.4 K/UL (ref 0–1.3)
MONOCYTES NFR BLD: 6.2 %
NEUTROPHILS # BLD: 5.4 K/UL (ref 1.7–7.7)
NEUTROPHILS NFR BLD: 77.5 %
PHOSPHATE SERPL-MCNC: 3.4 MG/DL (ref 2.5–4.9)
PLATELET # BLD AUTO: 192 K/UL (ref 135–450)
PMV BLD AUTO: 8.5 FL (ref 5–10.5)
POTASSIUM SERPL-SCNC: 4 MMOL/L (ref 3.5–5.1)
RBC # BLD AUTO: 4.34 M/UL (ref 4.2–5.9)
SODIUM SERPL-SCNC: 141 MMOL/L (ref 136–145)
TRIGL SERPL-MCNC: 139 MG/DL (ref 0–150)
VLDLC SERPL CALC-MCNC: 28 MG/DL
WBC # BLD AUTO: 6.9 K/UL (ref 4–11)

## 2025-07-12 PROCEDURE — 36415 COLL VENOUS BLD VENIPUNCTURE: CPT

## 2025-07-12 PROCEDURE — 80061 LIPID PANEL: CPT

## 2025-07-12 PROCEDURE — 6370000000 HC RX 637 (ALT 250 FOR IP): Performed by: INTERNAL MEDICINE

## 2025-07-12 PROCEDURE — 99223 1ST HOSP IP/OBS HIGH 75: CPT | Performed by: INTERNAL MEDICINE

## 2025-07-12 PROCEDURE — 80069 RENAL FUNCTION PANEL: CPT

## 2025-07-12 PROCEDURE — 6370000000 HC RX 637 (ALT 250 FOR IP)

## 2025-07-12 PROCEDURE — 85025 COMPLETE CBC W/AUTO DIFF WBC: CPT

## 2025-07-12 PROCEDURE — 83735 ASSAY OF MAGNESIUM: CPT

## 2025-07-12 PROCEDURE — 99238 HOSP IP/OBS DSCHRG MGMT 30/<: CPT | Performed by: HOSPITALIST

## 2025-07-12 PROCEDURE — G0378 HOSPITAL OBSERVATION PER HR: HCPCS

## 2025-07-12 PROCEDURE — 2580000003 HC RX 258: Performed by: INTERNAL MEDICINE

## 2025-07-12 RX ADMIN — ACETAMINOPHEN 650 MG: 325 TABLET ORAL at 06:59

## 2025-07-12 RX ADMIN — SODIUM CHLORIDE: 0.9 INJECTION, SOLUTION INTRAVENOUS at 07:02

## 2025-07-12 RX ADMIN — PANTOPRAZOLE SODIUM 40 MG: 40 TABLET, DELAYED RELEASE ORAL at 09:31

## 2025-07-12 ASSESSMENT — PAIN - FUNCTIONAL ASSESSMENT: PAIN_FUNCTIONAL_ASSESSMENT: ACTIVITIES ARE NOT PREVENTED

## 2025-07-12 ASSESSMENT — PAIN DESCRIPTION - LOCATION: LOCATION: HEAD

## 2025-07-12 ASSESSMENT — PAIN SCALES - GENERAL
PAINLEVEL_OUTOF10: 3
PAINLEVEL_OUTOF10: 0

## 2025-07-12 ASSESSMENT — PAIN DESCRIPTION - DESCRIPTORS: DESCRIPTORS: ACHING

## 2025-07-12 NOTE — PROGRESS NOTES
Discharge note: Patient has been seen by doctor. Discharge order obtained, and discharge instructions reviewed. Patient educated, using the teach back method, about follow up instructions and discharge instructions. A completed copy of the AVS instructions given to patient and all questions answered. IV catheter removed without complaints, catheter intact, site WNL. Discharged to ER via wheel chair.     Electronically signed by Ariana Wheeler RN on 7/12/2025 at 10:43 AM

## 2025-07-12 NOTE — CARE COORDINATION
Case Management Assessment            Discharge Note                    Date / Time of Note: 7/12/2025 8:40 AM                  Discharge Note Completed by: ОЛЬГА Santigao    Patient Name: Terry Roy   YOB: 1950  Diagnosis: Atypical chest pain [R07.89]  Chest pain, unspecified type [R07.9]  Unstable angina (HCC) [I20.0]   Date / Time: 7/10/2025  8:54 PM    Current PCP: Jacki Singleton MD  Clinic patient: Yes    Hospitalization in the last 30 days: No       Advance Directives:  Code Status: Full Code  [place] DNR form completed and on chart: Not Indicated    Financial:  Payor: [employer] MEDICARE / Plan: [employer] PREFERRED MEDICARE HMO / Product Type: *No Product type* /      Pharmacy:    [id number] - [place], [place] - [address] - P [phone] - F [phone] [address] [facility] [place] [address]  Phone: [phone] Fax: [phone]    [id number] - [place], [place] - [address] - P [phone] - F [phone]  [address] [facility] [place] [address]  Phone: [phone] Fax: [phone]      Assistance purchasing medications?:    Assistance provided by Case Management: None at this time    Does patient want to participate in local refill/ meds to beds program?:      Meds To Beds General Rules:  1. Can ONLY be done Monday- Friday between 8:30am-5pm  2. Prescription(s) must be in pharmacy by 3pm to be filled same day  3.Copy of patient's insurance/ prescription drug card and patient face sheet must be sent along with the prescription(s)  4. Cost of Rx cannot be added to hospital bill. If financial assistance is needed, please contact unit  or ;  or  CANNOT provide pharmacy voucher for patients co-pays  5. Patients can then  the prescription on their way out of the hospital at discharge, or pharmacy can deliver to the bedside if staff is available. (payment due at time of pick-up or delivery - cash, check,  or card accepted)     Able to afford home medications/ co-pay costs: Yes    ADLS:  Current PT AM-PAC Score:   /24  Current OT AM-PAC Score:   /24    DISCHARGE Disposition: Home- No Services Needed    LOC at discharge: Not Applicable  RADHA Completed: Not Indicated    Notification completed in HENS/PAS?:  Not Applicable    IMM Completed:   Not Indicated due to: Observation          Transportation:  Transportation PLAN for discharge: family   Mode of Transport: Private Car  Referrals made at DISCHARGE for outpatient continued care:  Not Applicable    Additional CM Notes: Patient to discharge home today with no needs and family to transport home.     COVID Result:    Lab Results   Component Value Date/Time    COVID19 NOT DETECTED 09/30/2024 08:11 AM       The Plan for Transition of Care is related to the following treatment goals of Atypical chest pain [R07.89]  Chest pain, unspecified type [R07.9]  Unstable angina (HCC) [I20.0]    The Patient and/or patient representative Terry and his family were provided with a choice of provider and agrees with the discharge plan Yes    Freedom of choice list was provided with basic dialogue that supports the patient's individualized plan of care/goals and shares the quality data associated with the providers. Yes    Care Transitions patient: No    ОЛЬГА Santiago  [facility]  Case Management Department  Ph: [phone]  Fax: [phone]

## 2025-07-12 NOTE — PLAN OF CARE
Problem: Safety - Adult  Goal: Free from fall injury  7/12/2025 0329 by Evelyn Herbert RN  Outcome: Progressing  Flowsheets (Taken 7/12/2025 0329)  Free From Fall Injury: Instruct family/caregiver on patient safety  Note: Fall protocol in place     Problem: Discharge Planning  Goal: Discharge to home or other facility with appropriate resources  7/12/2025 0329 by Evelyn Herbert RN  Outcome: Progressing  Flowsheets (Taken 7/12/2025 0329)  Discharge to home or other facility with appropriate resources:   Identify barriers to discharge with patient and caregiver   Identify discharge learning needs (meds, wound care, etc)   Refer to discharge planning if patient needs post-hospital services based on physician order or complex needs related to functional status, cognitive ability or social support system   Arrange for needed discharge resources and transportation as appropriate     Problem: ABCDS Injury Assessment  Goal: Absence of physical injury  7/12/2025 0329 by Evelyn Herbert RN  Outcome: Progressing  Flowsheets (Taken 7/12/2025 0329)  Absence of Physical Injury: Implement safety measures based on patient assessment     Problem: Pain  Goal: Verbalizes/displays adequate comfort level or baseline comfort level  7/12/2025 0329 by Evelyn Herbert RN  Outcome: Progressing  Flowsheets (Taken 7/12/2025 0329)  Verbalizes/displays adequate comfort level or baseline comfort level:   Encourage patient to monitor pain and request assistance   Administer analgesics based on type and severity of pain and evaluate response   Consider cultural and social influences on pain and pain management   Assess pain using appropriate pain scale   Implement non-pharmacological measures as appropriate and evaluate response   Notify Licensed Independent Practitioner if interventions unsuccessful or patient reports new pain     Problem: Respiratory - Adult  Goal: Achieves optimal ventilation and oxygenation  7/11/2025 1706 by  [[email or web address]] : #Chronic systolic HF ACC/AHA stage C, NYHA III\par 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 Plan to add MRA/SGLT2i once closer to euvolemia\par Diuretics: Increase lasix to 40mg BID\par PLs call our office if no weight loss and LE edema in 3 days. May consider switching to bumex. \par Device: should reassess LVEF after 3 mo GDMT\par HF education provided including lifestyle changes (low Na diet, fluid restriction, increase physical activity), current clinical condition, natural progression and prognosis.\par C-rehab once cleared by ortho\par Needs risk factors modification\par Close follow up\par \par #HTN\par Controlled\par \par #DM2\par PCP managing\par Will add SGLT2i - next visit\par \par #GERHARD\par Untreated\par Discussed importance of treating CPAP\par \par #Obesity\par Healthy lifestyle changes encouraged as well as weight loss\par \par #Active tobacco use\par Reports smoking 0.5-1 PPD\par Discuss tobacco cessation program and risks associated to tobacco use

## 2025-07-12 NOTE — PROGRESS NOTES
Internal Medicine Progress Note    Patient: Terry Roy   : 1950   Admit Date: 7/10/2025     Date: 2025     Interval History     Patient sitting up in bed in NAD.   Patient has baseline feelings of chest tightness that is reproducible.  No significant overnight events.   VS: stable except elevated BP  Coronary angiogram planned at 12pm      HPI  Terry Roy is a 75 year old man with PMHx of COPD, CAD s/p CABG (2019), HLD, GERD, migraines, and insomnia who presents with chest pain.      Yesterday, patient woke up around 6am, drank coffee, went on a walk around 7am for about 45 minutes, came home and showered as per his usual morning routine. He reports using his albuterol inhaler 2 hours later due to SOB. He ate dinner around 4:30/5:30pm and consumed salad with chicken and vinegar/oil dressing and pretzels. He watched TV for the rest of the day and experienced chest pressure at 8pm while he was sitting and watching TV.  Nothing that day nor food consumed was out of the ordinary for him.   Chest pressure was only on the right and did not radiate. Patient has baseline chest pain and this was different from baseline. States this is not necessarily pain, he rates pain 1/10. Patient typically measures BP at home twice a day. During chest pressure episode, patient measured his BP at 190-200+/110. His usual BP at home is reported as about 120s/69. He states high for him is considered 130s/80s. He felt light while he was walking and \"spacey\" but he was breathing normally and was not having any GI symptoms at that time. He had not experienced GI symptoms that entire day. Also experienced no dizziness, no N/V, no loss of strength, no lightheadedness, and no chest tenderness to palpation at time of episode. He took a baby aspirin and came to the ED. He started experiencing difficulty breathing while at the ED wondering when he would be taken back but believes this may have been due to anxiety. Chest

## 2025-07-12 NOTE — PLAN OF CARE
Problem: Safety - Adult  Goal: Free from fall injury  7/12/2025 0831 by Ariana Wheeler, RN  Outcome: Progressing  Flowsheets (Taken 7/12/2025 0831)  Free From Fall Injury: Instruct family/caregiver on patient safety  Note: Pt is a Fall Risk. See Harkins Fall Risk Score. Pt bed in low position and side rails up. Call light and belongings in reach. Pt encouraged to call for assistance. Will continue with hourly rounds for PO intake, pain needs, toileting, and repositioning as needed.        Problem: Discharge Planning  Goal: Discharge to home or other facility with appropriate resources  7/12/2025 0831 by Ariana Wheeler, RN  Outcome: Progressing  Flowsheets (Taken 7/12/2025 0831)  Discharge to home or other facility with appropriate resources:   Identify barriers to discharge with patient and caregiver   Identify discharge learning needs (meds, wound care, etc)   Arrange for needed discharge resources and transportation as appropriate  Note: Pt here post cath lab.  Patient has chest pressure relief

## 2025-07-12 NOTE — PROGRESS NOTES
[facility]   Cardiology Progress Note     Admit Date: 7/10/2025     Reason for follow up: Follow-up after left heart cath    HPI and Interval History:   Patient seen and examined. Clinical notes reviewed.   Telemetry reviewed. No new complaint today.   No major events overnight.   Denies having chest pain, shortness of breath, dyspnea on exertion, Orthopnea, PND at the time of this visit.    Review of System:  Pertinent positive and negatives are in the HPI and interval above, the rest are negative.     Physical Examination:  Vitals:    25 0929   BP: (!) 146/95   Pulse: 58   Resp: 16   Temp: 97.4 °F (36.3 °C)   SpO2: 94%      In: 720 [P.O.:720]  Out: 5    Wt Readings from Last 3 Encounters:   25 80.3 kg (177 lb 0.5 oz)   25 80.9 kg (178 lb 6.4 oz)   06/10/25 80.3 kg (177 lb)     Temp  Av.6 °F (36.4 °C)  Min: 97.4 °F (36.3 °C)  Max: 97.8 °F (36.6 °C)  Pulse  Av.2  Min: 49  Max: 60  BP  Min: 123/85  Max: 150/82  SpO2  Av.8 %  Min: 94 %  Max: 100 %    Intake/Output Summary (Last 24 hours) at 2025 1006  Last data filed at 2025 0500  Gross per 24 hour   Intake 720 ml   Output 5 ml   Net 715 ml       Telemetry: Sinus rhythm   General: Calm and not in acute distress.   HEENT: Atraumatic normocephalic. Normal eye movements.   Neck: No JVP.  Cardiac: RRR, no appreciable murmurs.   Lungs: Not labored. Clear to ausculation.   Extremities. No pitting edema  Neurology: A&O x3.   Psychiatry: Normal affect and mood.    Labs:  Reviewed.   Recent Labs     07/10/25  2122 07/11/25  1030 25  0418    144 141   K 3.7 4.1 4.0    106 105   CO2 28 27 27   PHOS  --  3.4 3.4   BUN 16 14 17   CREATININE 0.8 0.7* 0.8     Recent Labs     07/10/25  2122 07/11/25  1030 25  0418   WBC 5.0 6.9 6.9   HGB 14.9 15.1 13.9   HCT 42.7 44.1 40.2*   MCV 92.2 92.8 92.7    214 192     Lab Results   Component Value Date/Time    TROPONINI <0.01 2022 08:35 PM     Estimated

## 2025-07-12 NOTE — PLAN OF CARE
Problem: Safety - Adult  Goal: Free from fall injury  7/12/2025 1041 by Ariana Wheeler RN  Outcome: Adequate for Discharge  7/12/2025 0831 by Ariana Wheeler RN  Outcome: Progressing  Flowsheets (Taken 7/12/2025 0831)  Free From Fall Injury: Instruct family/caregiver on patient safety  Note: Pt is a Fall Risk. See Harkins Fall Risk Score. Pt bed in low position and side rails up. Call light and belongings in reach. Pt encouraged to call for assistance. Will continue with hourly rounds for PO intake, pain needs, toileting, and repositioning as needed.     7/12/2025 0329 by Evelyn Herbert RN  Outcome: Progressing  Flowsheets (Taken 7/12/2025 0329)  Free From Fall Injury: Instruct family/caregiver on patient safety  Note: Fall protocol in place     Problem: Discharge Planning  Goal: Discharge to home or other facility with appropriate resources  7/12/2025 1041 by Ariana Wheeler RN  Outcome: Adequate for Discharge  7/12/2025 0831 by Ariana Wheeler RN  Outcome: Progressing  Flowsheets (Taken 7/12/2025 0831)  Discharge to home or other facility with appropriate resources:   Identify barriers to discharge with patient and caregiver   Identify discharge learning needs (meds, wound care, etc)   Arrange for needed discharge resources and transportation as appropriate  Note: Pt here post cath lab.  Patient has chest pressure relief   7/12/2025 0329 by Evelyn Herbert RN  Outcome: Progressing  Flowsheets (Taken 7/12/2025 0329)  Discharge to home or other facility with appropriate resources:   Identify barriers to discharge with patient and caregiver   Identify discharge learning needs (meds, wound care, etc)   Refer to discharge planning if patient needs post-hospital services based on physician order or complex needs related to functional status, cognitive ability or social support system   Arrange for needed discharge resources and transportation as appropriate     Problem: ABCDS Injury Assessment  Goal: Absence of

## 2025-07-13 ENCOUNTER — HOSPITAL ENCOUNTER (EMERGENCY)
Age: 75
Discharge: HOME | End: 2025-07-13
Payer: MEDICARE

## 2025-07-13 ENCOUNTER — APPOINTMENT (OUTPATIENT)
Dept: CT IMAGING | Age: 75
End: 2025-07-13
Payer: MEDICARE

## 2025-07-13 VITALS
SYSTOLIC BLOOD PRESSURE: 132 MMHG | BODY MASS INDEX: 24.01 KG/M2 | OXYGEN SATURATION: 96 % | HEART RATE: 58 BPM | DIASTOLIC BLOOD PRESSURE: 69 MMHG | TEMPERATURE: 98.4 F | RESPIRATION RATE: 16 BRPM | WEIGHT: 177 LBS

## 2025-07-13 DIAGNOSIS — T14.8XXA BRUISING: Primary | ICD-10-CM

## 2025-07-13 LAB
ABO/RH: NORMAL
ANION GAP SERPL CALCULATED.3IONS-SCNC: 11 MMOL/L (ref 3–16)
ANTIBODY SCREEN: NORMAL
BASOPHILS # BLD: 0 K/UL (ref 0–0.2)
BASOPHILS NFR BLD: 0.3 %
BUN SERPL-MCNC: 10 MG/DL (ref 7–20)
CALCIUM SERPL-MCNC: 9.2 MG/DL (ref 8.3–10.6)
CHLORIDE SERPL-SCNC: 107 MMOL/L (ref 99–110)
CO2 SERPL-SCNC: 25 MMOL/L (ref 21–32)
CREAT SERPL-MCNC: 0.7 MG/DL (ref 0.8–1.3)
DEPRECATED RDW RBC AUTO: 12.8 % (ref 12.4–15.4)
EOSINOPHIL # BLD: 0.2 K/UL (ref 0–0.6)
EOSINOPHIL NFR BLD: 3.1 %
GFR SERPLBLD CREATININE-BSD FMLA CKD-EPI: >90 ML/MIN/{1.73_M2}
GLUCOSE SERPL-MCNC: 101 MG/DL (ref 70–99)
HCT VFR BLD AUTO: 40.7 % (ref 40.5–52.5)
HGB BLD-MCNC: 14.1 G/DL (ref 13.5–17.5)
LYMPHOCYTES # BLD: 1 K/UL (ref 1–5.1)
LYMPHOCYTES NFR BLD: 18.9 %
MCH RBC QN AUTO: 31.8 PG (ref 26–34)
MCHC RBC AUTO-ENTMCNC: 34.6 G/DL (ref 31–36)
MCV RBC AUTO: 92.1 FL (ref 80–100)
MONOCYTES # BLD: 0.5 K/UL (ref 0–1.3)
MONOCYTES NFR BLD: 8.6 %
NEUTROPHILS # BLD: 3.7 K/UL (ref 1.7–7.7)
NEUTROPHILS NFR BLD: 69.1 %
PLATELET # BLD AUTO: 207 K/UL (ref 135–450)
PMV BLD AUTO: 8.6 FL (ref 5–10.5)
POTASSIUM SERPL-SCNC: 4.1 MMOL/L (ref 3.5–5.1)
RBC # BLD AUTO: 4.42 M/UL (ref 4.2–5.9)
SODIUM SERPL-SCNC: 143 MMOL/L (ref 136–145)
WBC # BLD AUTO: 5.4 K/UL (ref 4–11)

## 2025-07-13 PROCEDURE — 99285 EMERGENCY DEPT VISIT HI MDM: CPT

## 2025-07-13 PROCEDURE — 86901 BLOOD TYPING SEROLOGIC RH(D): CPT

## 2025-07-13 PROCEDURE — 86850 RBC ANTIBODY SCREEN: CPT

## 2025-07-13 PROCEDURE — 86900 BLOOD TYPING SEROLOGIC ABO: CPT

## 2025-07-13 PROCEDURE — 75635 CT ANGIO ABDOMINAL ARTERIES: CPT

## 2025-07-13 PROCEDURE — 36415 COLL VENOUS BLD VENIPUNCTURE: CPT

## 2025-07-13 PROCEDURE — 85025 COMPLETE CBC W/AUTO DIFF WBC: CPT

## 2025-07-13 PROCEDURE — 6360000004 HC RX CONTRAST MEDICATION

## 2025-07-13 PROCEDURE — 80048 BASIC METABOLIC PNL TOTAL CA: CPT

## 2025-07-13 RX ORDER — IOPAMIDOL 755 MG/ML
75 INJECTION, SOLUTION INTRAVASCULAR
Status: COMPLETED | OUTPATIENT
Start: 2025-07-13 | End: 2025-07-13

## 2025-07-13 RX ADMIN — IOPAMIDOL 75 ML: 755 INJECTION, SOLUTION INTRAVENOUS at 13:35

## 2025-07-13 ASSESSMENT — LIFESTYLE VARIABLES: HOW OFTEN DO YOU HAVE A DRINK CONTAINING ALCOHOL: NEVER

## 2025-07-13 ASSESSMENT — PAIN - FUNCTIONAL ASSESSMENT: PAIN_FUNCTIONAL_ASSESSMENT: NONE - DENIES PAIN

## 2025-07-13 NOTE — ED PROVIDER NOTES
Anion Gap 11 3 - 16    Glucose 101 (H) 70 - 99 mg/dL    BUN 10 7 - 20 mg/dL    Creatinine 0.7 (L) 0.8 - 1.3 mg/dL    Est, Glom Filt Rate >90 >60    Calcium 9.2 8.3 - 10.6 mg/dL   TYPE AND SCREEN   Result Value Ref Range    ABO/Rh A POS     Antibody Screen NEG          ED BEDSIDE ULTRASOUND:  No results found.    RECENT VITALS:  BP: 132/69, Temp: 98.4 °F (36.9 °C), Pulse: 58,Respirations: 16, SpO2: 96 %     Procedures     None    ED Course     Nursing Notes, Past Medical Hx, Past Surgical Hx, Social Hx, Allergies, and Family Hx were reviewed.    ED Course as of 07/13/25 1740   Sun Jul 13, 2025   1407 Discussed with cardiology to inform them there patient was here in the emergency department.  If the patient is admitted they will plan to see him on admission.  If he is not admitted, we will follow-up with them to arrange close follow-up appointment tomorrow. [MS]   1740 Discussed with cardiology given the negative CTA findings, they would recommend having the patient call the office tomorrow morning to see if they would like to move up his appointment.  Patient is otherwise safe for discharge at this time. [MS]   1740 Discharge: At this time, the patient was deemed appropriate for discharge. Workup, treatment and diagnosis were discussed with the patient and/or family members; the patient agrees to the plan and all questions were addressed and answered. My customary discharge instructions, including strict return precautions for new or worsening symptoms or any concern he believes warrants acute physician evaluation, were provided. he was subsequently sent home in stable/improved condition.   [MS]      ED Course User Index  [MS] Tereza Sandoval MD       The patient was given the following medications:  Orders Placed This Encounter   Medications    iopamidol (ISOVUE-370) 76 % injection 75 mL       CONSULTS:  IP CONSULT TO CARDIOLOGY  IP CONSULT TO CARDIOLOGY    Review of Systems     Review of Systems   All

## 2025-07-13 NOTE — DISCHARGE INSTRUCTIONS
Please call your cardiologist office first thing tomorrow morning to discuss your visit to the emergency department today and see if they would like to move up your follow-up appointment to earlier this week.    Please return to the emergency department if you have any worsening swelling or pain to the site.  You may use an ice pack over the area to help with pain and bruising.

## 2025-07-14 ENCOUNTER — TELEPHONE (OUTPATIENT)
Dept: CARDIOLOGY CLINIC | Age: 75
End: 2025-07-14

## 2025-07-14 ENCOUNTER — OFFICE VISIT (OUTPATIENT)
Dept: INTERNAL MEDICINE CLINIC | Age: 75
End: 2025-07-14

## 2025-07-14 ENCOUNTER — TELEPHONE (OUTPATIENT)
Dept: INTERNAL MEDICINE CLINIC | Age: 75
End: 2025-07-14

## 2025-07-14 VITALS
DIASTOLIC BLOOD PRESSURE: 62 MMHG | BODY MASS INDEX: 24.39 KG/M2 | WEIGHT: 179.8 LBS | TEMPERATURE: 97.7 F | SYSTOLIC BLOOD PRESSURE: 106 MMHG | OXYGEN SATURATION: 97 % | HEART RATE: 51 BPM

## 2025-07-14 DIAGNOSIS — Z98.890 S/P CORONARY ANGIOGRAM: ICD-10-CM

## 2025-07-14 DIAGNOSIS — S30.1XXD: ICD-10-CM

## 2025-07-14 DIAGNOSIS — I25.119 CORONARY ARTERY DISEASE INVOLVING NATIVE CORONARY ARTERY OF NATIVE HEART WITH ANGINA PECTORIS: ICD-10-CM

## 2025-07-14 DIAGNOSIS — Z09 HOSPITAL DISCHARGE FOLLOW-UP: Primary | ICD-10-CM

## 2025-07-14 NOTE — PATIENT INSTRUCTIONS
Thank you for choosing [facility] for your cardiac care.    During your visit today, we reviewed and confirmed your cardiac medications along with  medication prescribed by your other healthcare team members. Please be sure to discuss any  changes to medication with your providers.    Please bring a list of ALL medications (or the bottles) with you to EVERY appointment.  Also include vitamins and over-the-counter medications.    If you need refills for any cardiac medications, please call your pharmacy and they will reach out to us electronically.    Did your provider order testing today? If yes, then you will receive your results in three  possible ways. You can receive a [employer] message, a phone call, or letter in the mail. Please  note, if you are an active [employer] user, some of your testing will be available within 1-2 days.    Finally, please know that it is good for your heart to exercise and follow a healthy, low-fat diet  as advised by your physician and health care providers.    If you are experiencing a medical emergency, please call 911 immediately.    It's easy to register for a [employer] account if you don't already have one. With a [employer]  account you can manage your health record, view test results, schedule appointments and  more.     Dr. Mo's clinical staff can be reached at the following phone number: [phone]    If any cardiac testing is ordered, please contact central scheduling at [phone] to get your test scheduled.     Decrease Metoprolol to 12.5 mg daily    Bilateral Helical Rim Advancement Flap Text: The defect edges were debeveled with a #15 blade scalpel.  Given the location of the defect and the proximity to free margins (helical rim) a bilateral helical rim advancement flap was deemed most appropriate.  Using a sterile surgical marker, the appropriate advancement flaps were drawn incorporating the defect and placing the expected incisions between the helical rim and antihelix where possible.  The area thus outlined was incised through and through with a #15 scalpel blade.  With a skin hook and iris scissors, the flaps were gently and sharply undermined and freed up.

## 2025-07-14 NOTE — TELEPHONE ENCOUNTER
Pt called stating on Friday 7/10 he went to [facility] for chest pain and ended up having an angiogram performed on 7/11. He says he feels okay now and has an appt scheduled with RK for Thursday 7/17. He is wanting to know if RK wants to see him sooner than that or keep 7/17 appt.    [phone]

## 2025-07-14 NOTE — TELEPHONE ENCOUNTER
are Transitions Initial Follow Up Call    Outreach made within 2 business days of discharge: Yes    Patient: Terry Roy Patient : 1950   MRN: [id number]  Reason for Admission: Increased bruising at insertion sight right thigh status post angiogram  Discharge Date: 25       Spoke with: I spoke to the patient directly - Edy  Discharge department/facility: [facility] Interactive Patient Contact:  Was patient able to fill all prescriptions: Yes  Was patient instructed to bring all medications to the follow-up visit: Yes  Is patient taking all medications as directed in the discharge summary? Yes  Does patient understand their discharge instructions: Yes  Does patient have questions or concerns that need addressed prior to 7-14 day follow up office visit: yes     Additional needs identified to be addressed with provider  Patient is concerned about the insertion sight , however he is going to see the cardiologist on Thursday.             Scheduled appointment with PCP within 7-14 days    Follow Up  Future Appointments   Date Time Provider Department Center   2025  8:45 AM Gaurang Mo MD [facility]   2026 10:30 AM Jacki Singleton MD [facility]       Miya Perez MA

## 2025-07-14 NOTE — PROGRESS NOTES
[facility]     Outpatient Cardiology         Patient Name:  Terry Roy  Primary Care Physician: Jacki Singleton MD  07/17/25     Assessment & Plan    Assessment / Plan:       CAD post CABG-had recent hospitalization.  Underwent coronary angiogram.  No new significant obstructive disease.  Plan for medical therapy.  Currently does not have any chest pain.  Had right groin hematoma.  Underwent CTA.  No active arterial bleed seen.  Hemoglobin is stable.    Hyperlipidemia-LDL at goal.    Valvular heart disease-mild MR, TR, AI, normal LV function.  Clinically stable.    Asymptomatic bradycardia-will reduce dose of Toprol-XL to 12.5 mg daily.    Follow-up as scheduled.            Chief Complaint:     Chief Complaint   Patient presents with    6 Month Follow-Up       History of Present Illness:       HPI     Terry Arvizus a 75 y.o. male with PMH of COPD, CAD, CABG and Hyperlipidemia here for a hospital follow up.    He was recently admitted to the hospital with complaints of chest pain.  His most recent CT Calcium score was 2949. He underwent an angiogram showing a patent PDA vein graft, patent LIMA to LAD and closed OM vein graft with otherwise no significant native coronary disease and normal LV filling pressure.         Today he reports he is still having pain and bruising at the groin where the catheter from the angiogram was inserted.  Otherwise he is doing well. Today he complaining of his heart rate being in the 40's sometimes but he is asymptomatic during these episodes.   Patient denies any chest pain, shortness of breath, palpitations, presyncope or syncope. No TIA. No claudication. No recent hospitalizations        PMH  Past Medical History:   Diagnosis Date    Acid reflux     Asthma     CAD (coronary artery disease)     Chronic back pain     Constant    COPD (chronic obstructive pulmonary disease) ([facility])     mild    Depression     Off and on for years started in the 70s

## 2025-07-14 NOTE — PROGRESS NOTES
Post-Discharge Transitional Care  Follow Up      Terry Roy   YOB: 1950    Date of Office Visit:  7/14/2025  Date of Hospital Admission: 7/13/25 (7/10/2025)  Date of Hospital Discharge: 7/13/25 (7/12/2025)  Risk of hospital readmission (high >=14%. Medium >=10%) :Readmission Risk Score: 9.8      Care management risk score Rising risk (score 2-5) and Complex Care (Scores >=6): No Risk Score On File     Non face to face  following discharge, date last encounter closed (first attempt may have been earlier):  Call initiated 2 business days of discharge: TCM call done 7/14/2024 at 9:03 am    ASSESSMENT/PLAN:   Hospital discharge follow-up  -     VT DISCHARGE MEDS RECONCILED W/ CURRENT OUTPATIENT MED LIST  Coronary artery disease involving native coronary artery of native heart with angina pectoris  S/P coronary angiogram  Traumatic ecchymosis of groin, subsequent encounter      Medical Decision Making: high complexity  No follow-ups on file.    On this date 7/14/2025 I have spent 42 minutes reviewing previous notes, test results and face to face with the patient discussing the diagnosis and importance of compliance with the treatment plan as well as documenting on the day of the visit.       Subjective:   HPI:  Follow up of Hospital problems/diagnosis(es):   Coronary artery disease involving native coronary artery of native heart with angina pectoris  Status post coronary angiogram  Traumatic ecchymosis of groin, subsequent encounter      Inpatient course: Discharge summary reviewed- see chart.    Interval history/Current status:   Terry Roy is a 75 y.o. male with a history of CABG, asthma, COPD, hyperlipidemia, with recent admission for chest pain status post angiogram without PCI on 7/11/2025.    He was in the emergency department yesterday for evaluation of right groin bruising, swelling.  The patient had a CT scan without any evidence of hematoma or fluid collection he was instructed to

## 2025-07-17 ENCOUNTER — OFFICE VISIT (OUTPATIENT)
Dept: CARDIOLOGY CLINIC | Age: 75
End: 2025-07-17
Payer: MEDICARE

## 2025-07-17 VITALS
HEART RATE: 50 BPM | SYSTOLIC BLOOD PRESSURE: 124 MMHG | BODY MASS INDEX: 24.54 KG/M2 | OXYGEN SATURATION: 97 % | HEIGHT: 72 IN | WEIGHT: 181.2 LBS | DIASTOLIC BLOOD PRESSURE: 70 MMHG

## 2025-07-17 DIAGNOSIS — E78.5 HYPERLIPIDEMIA, UNSPECIFIED HYPERLIPIDEMIA TYPE: ICD-10-CM

## 2025-07-17 DIAGNOSIS — I25.10 CORONARY ARTERY DISEASE INVOLVING NATIVE CORONARY ARTERY OF NATIVE HEART WITHOUT ANGINA PECTORIS: Primary | ICD-10-CM

## 2025-07-17 PROCEDURE — 1159F MED LIST DOCD IN RCRD: CPT | Performed by: INTERNAL MEDICINE

## 2025-07-17 PROCEDURE — 1123F ACP DISCUSS/DSCN MKR DOCD: CPT | Performed by: INTERNAL MEDICINE

## 2025-07-17 PROCEDURE — 1160F RVW MEDS BY RX/DR IN RCRD: CPT | Performed by: INTERNAL MEDICINE

## 2025-07-17 PROCEDURE — 99214 OFFICE O/P EST MOD 30 MIN: CPT | Performed by: INTERNAL MEDICINE

## 2025-07-17 RX ORDER — M-VIT,TX,IRON,MINS/CALC/FOLIC 27MG-0.4MG
1 TABLET ORAL DAILY
COMMUNITY

## 2025-07-17 RX ORDER — METOPROLOL SUCCINATE 25 MG/1
12.5 TABLET, EXTENDED RELEASE ORAL DAILY
Qty: 45 TABLET | Refills: 3 | Status: SHIPPED | OUTPATIENT
Start: 2025-07-17

## 2025-07-17 RX ORDER — ASCORBIC ACID 100 MG
TABLET,CHEWABLE ORAL
COMMUNITY

## 2025-07-17 RX ORDER — ANTIARTHRITIC COMBINATION NO.2 900 MG
TABLET ORAL
COMMUNITY
Start: 2024-12-01

## 2025-08-08 ENCOUNTER — HOSPITAL ENCOUNTER (EMERGENCY)
Age: 75
Discharge: HOME | End: 2025-08-08
Attending: EMERGENCY MEDICINE
Payer: MEDICARE

## 2025-08-08 ENCOUNTER — APPOINTMENT (OUTPATIENT)
Dept: GENERAL RADIOLOGY | Age: 75
End: 2025-08-08
Payer: MEDICARE

## 2025-08-08 VITALS
WEIGHT: 182.6 LBS | RESPIRATION RATE: 15 BRPM | OXYGEN SATURATION: 96 % | BODY MASS INDEX: 24.77 KG/M2 | SYSTOLIC BLOOD PRESSURE: 128 MMHG | HEART RATE: 57 BPM | TEMPERATURE: 97.6 F | DIASTOLIC BLOOD PRESSURE: 80 MMHG

## 2025-08-08 DIAGNOSIS — R06.09 OTHER FORM OF DYSPNEA: Primary | ICD-10-CM

## 2025-08-08 LAB
ALBUMIN SERPL-MCNC: 4.5 G/DL (ref 3.4–5)
ALP SERPL-CCNC: 74 U/L (ref 40–129)
ALT SERPL-CCNC: 18 U/L (ref 10–40)
ANION GAP SERPL CALCULATED.3IONS-SCNC: 11 MMOL/L (ref 3–16)
AST SERPL-CCNC: 22 U/L (ref 15–37)
BASOPHILS # BLD: 0 K/UL (ref 0–0.2)
BASOPHILS NFR BLD: 0.1 %
BILIRUB DIRECT SERPL-MCNC: 0.2 MG/DL (ref 0–0.3)
BILIRUB INDIRECT SERPL-MCNC: 0.1 MG/DL (ref 0–1)
BILIRUB SERPL-MCNC: 0.3 MG/DL (ref 0–1)
BILIRUB UR QL STRIP.AUTO: NEGATIVE
BUN SERPL-MCNC: 18 MG/DL (ref 7–20)
CALCIUM SERPL-MCNC: 9.5 MG/DL (ref 8.3–10.6)
CHLORIDE SERPL-SCNC: 104 MMOL/L (ref 99–110)
CLARITY UR: CLEAR
CO2 SERPL-SCNC: 25 MMOL/L (ref 21–32)
COLOR UR: YELLOW
CREAT SERPL-MCNC: 0.8 MG/DL (ref 0.8–1.3)
DEPRECATED RDW RBC AUTO: 12.7 % (ref 12.4–15.4)
EKG ATRIAL RATE: 65 BPM
EKG DIAGNOSIS: NORMAL
EKG P AXIS: 54 DEGREES
EKG P-R INTERVAL: 200 MS
EKG Q-T INTERVAL: 408 MS
EKG QRS DURATION: 92 MS
EKG QTC CALCULATION (BAZETT): 424 MS
EKG R AXIS: 21 DEGREES
EKG T AXIS: 25 DEGREES
EKG VENTRICULAR RATE: 65 BPM
EOSINOPHIL # BLD: 0.1 K/UL (ref 0–0.6)
EOSINOPHIL NFR BLD: 2.4 %
GFR SERPLBLD CREATININE-BSD FMLA CKD-EPI: >90 ML/MIN/{1.73_M2}
GLUCOSE SERPL-MCNC: 106 MG/DL (ref 70–99)
GLUCOSE UR STRIP.AUTO-MCNC: NEGATIVE MG/DL
HCT VFR BLD AUTO: 42.4 % (ref 40.5–52.5)
HGB BLD-MCNC: 14.7 G/DL (ref 13.5–17.5)
HGB UR QL STRIP.AUTO: NEGATIVE
KETONES UR STRIP.AUTO-MCNC: NEGATIVE MG/DL
LEUKOCYTE ESTERASE UR QL STRIP.AUTO: NEGATIVE
LIPASE SERPL-CCNC: 25 U/L (ref 13–60)
LYMPHOCYTES # BLD: 1 K/UL (ref 1–5.1)
LYMPHOCYTES NFR BLD: 17.1 %
MCH RBC QN AUTO: 32.2 PG (ref 26–34)
MCHC RBC AUTO-ENTMCNC: 34.6 G/DL (ref 31–36)
MCV RBC AUTO: 93 FL (ref 80–100)
MONOCYTES # BLD: 0.4 K/UL (ref 0–1.3)
MONOCYTES NFR BLD: 6.6 %
NEUTROPHILS # BLD: 4.2 K/UL (ref 1.7–7.7)
NEUTROPHILS NFR BLD: 73.8 %
NITRITE UR QL STRIP.AUTO: NEGATIVE
PH UR STRIP.AUTO: 7 [PH] (ref 5–8)
PLATELET # BLD AUTO: 197 K/UL (ref 135–450)
PMV BLD AUTO: 8.8 FL (ref 5–10.5)
POTASSIUM SERPL-SCNC: 4 MMOL/L (ref 3.5–5.1)
PROT SERPL-MCNC: 7.1 G/DL (ref 6.4–8.2)
PROT UR STRIP.AUTO-MCNC: NEGATIVE MG/DL
RBC # BLD AUTO: 4.56 M/UL (ref 4.2–5.9)
RBC #/AREA URNS HPF: NORMAL /HPF (ref 0–4)
SODIUM SERPL-SCNC: 140 MMOL/L (ref 136–145)
SP GR UR STRIP.AUTO: <=1.005 (ref 1–1.03)
TROPONIN, HIGH SENSITIVITY: 9 NG/L (ref 0–22)
TROPONIN, HIGH SENSITIVITY: 9 NG/L (ref 0–22)
UA DIPSTICK W REFLEX MICRO PNL UR: NORMAL
URN SPEC COLLECT METH UR: NORMAL
UROBILINOGEN UR STRIP-ACNC: 0.2 E.U./DL
WBC # BLD AUTO: 5.7 K/UL (ref 4–11)
WBC #/AREA URNS HPF: NORMAL /HPF (ref 0–5)

## 2025-08-08 PROCEDURE — 81001 URINALYSIS AUTO W/SCOPE: CPT

## 2025-08-08 PROCEDURE — 85025 COMPLETE CBC W/AUTO DIFF WBC: CPT

## 2025-08-08 PROCEDURE — 71046 X-RAY EXAM CHEST 2 VIEWS: CPT

## 2025-08-08 PROCEDURE — 80076 HEPATIC FUNCTION PANEL: CPT

## 2025-08-08 PROCEDURE — 83690 ASSAY OF LIPASE: CPT

## 2025-08-08 PROCEDURE — 84484 ASSAY OF TROPONIN QUANT: CPT

## 2025-08-08 PROCEDURE — 80048 BASIC METABOLIC PNL TOTAL CA: CPT

## 2025-08-08 PROCEDURE — 99285 EMERGENCY DEPT VISIT HI MDM: CPT

## 2025-08-08 PROCEDURE — 93005 ELECTROCARDIOGRAM TRACING: CPT | Performed by: EMERGENCY MEDICINE

## 2025-08-08 ASSESSMENT — LIFESTYLE VARIABLES: HOW OFTEN DO YOU HAVE A DRINK CONTAINING ALCOHOL: NEVER

## 2025-08-08 ASSESSMENT — ENCOUNTER SYMPTOMS
COUGH: 0
NAUSEA: 0
VOMITING: 0
DIARRHEA: 0
ABDOMINAL PAIN: 1
CHEST TIGHTNESS: 0
SHORTNESS OF BREATH: 1

## 2025-08-08 ASSESSMENT — PAIN - FUNCTIONAL ASSESSMENT: PAIN_FUNCTIONAL_ASSESSMENT: 0-10

## 2025-08-08 ASSESSMENT — PAIN DESCRIPTION - DESCRIPTORS: DESCRIPTORS: DISCOMFORT

## 2025-08-08 ASSESSMENT — PAIN DESCRIPTION - ORIENTATION: ORIENTATION: MID

## 2025-08-08 ASSESSMENT — PAIN SCALES - GENERAL: PAINLEVEL_OUTOF10: 1

## 2025-08-08 ASSESSMENT — PAIN DESCRIPTION - LOCATION: LOCATION: CHEST

## 2025-08-18 ENCOUNTER — OFFICE VISIT (OUTPATIENT)
Dept: ENT CLINIC | Age: 75
End: 2025-08-18
Payer: MEDICARE

## 2025-08-18 VITALS — HEART RATE: 78 BPM | DIASTOLIC BLOOD PRESSURE: 88 MMHG | SYSTOLIC BLOOD PRESSURE: 140 MMHG | TEMPERATURE: 97.5 F

## 2025-08-18 DIAGNOSIS — J38.7 PRESBYLARYNGES: ICD-10-CM

## 2025-08-18 DIAGNOSIS — R42 VERTIGO: ICD-10-CM

## 2025-08-18 DIAGNOSIS — R49.0 HOARSE VOICE QUALITY: Primary | ICD-10-CM

## 2025-08-18 PROCEDURE — 31575 DIAGNOSTIC LARYNGOSCOPY: CPT | Performed by: OTOLARYNGOLOGY

## 2025-08-18 ASSESSMENT — ENCOUNTER SYMPTOMS
EYE REDNESS: 0
VOICE CHANGE: 1
NAUSEA: 0
SINUS PRESSURE: 0
SORE THROAT: 0
COUGH: 0
EYE ITCHING: 0
RHINORRHEA: 0
SHORTNESS OF BREATH: 0
TROUBLE SWALLOWING: 0
FACIAL SWELLING: 0
SINUS PAIN: 0
EYE PAIN: 0
DIARRHEA: 0
CHOKING: 0

## 2025-08-19 RX ORDER — MECLIZINE HYDROCHLORIDE 25 MG/1
25 TABLET ORAL 3 TIMES DAILY PRN
Qty: 30 TABLET | Refills: 1 | Status: SHIPPED | OUTPATIENT
Start: 2025-08-19

## (undated) DEVICE — CATHETER KIT JL4 JR4 5FR 100CM 145 PGTL DXTERITY

## (undated) DEVICE — FORCEPS BX L240CM JAW DIA2.8MM L CAP W/ NDL MIC MESH TOOTH

## (undated) DEVICE — CLIP RESOLUTION 360

## (undated) DEVICE — GUIDEWIRE VASC IMAG 0.035INX150CM 5MM FLX FIRM TIP 3MM J

## (undated) DEVICE — SYMMETRY® FORCEPS, HOUGH CRURA STAPEDECTOMY, ANGLED 45° LEFT, 5.0 MM BLADE, SERRATED: Brand: SYMMETRY HOUGH

## (undated) DEVICE — CATHETER ANGIO 3DRC 0.045 INX5 FRX100 CM THRULUMEN INFINITI

## (undated) DEVICE — PINNACLE INTRODUCER SHEATH: Brand: PINNACLE

## (undated) DEVICE — CATHETER COR DIAG PIGTAILS PIG 145 CRV 5FR 110CM 6 SIDE H

## (undated) DEVICE — Device

## (undated) DEVICE — CANNULA SAMP CO2 AD GRN 7FT CO2 AND 7FT O2 TBNG UNIV CONN

## (undated) DEVICE — FORCEPS BX L240CM JAW DIA2.4MM ORNG L CAP W/ NDL DISP RAD

## (undated) DEVICE — CATHETER DIAG 5FR L100CM SPEC IMA CRV SZ DBL BRAID WIRE SFT

## (undated) DEVICE — CATHETER COR DIAG MP MPA 5FR 100CM 2 SIDE H DXTERITY

## (undated) DEVICE — CATHETER DIAG 6FR L110CM PIGTAILS CRV STYL PIG145 DXTERITY

## (undated) DEVICE — GUIDEWIRE VASC L145CM DIA0035IN FLPY TIP EXTN COMPATIBLE STR

## (undated) DEVICE — PAD, DEFIB, ADULT, RADIOTRANS, PHYSIO: Brand: MEDLINE

## (undated) DEVICE — CATH LAB PACK: Brand: MEDLINE INDUSTRIES, INC.